# Patient Record
Sex: FEMALE | Race: ASIAN | Employment: FULL TIME | URBAN - METROPOLITAN AREA
[De-identification: names, ages, dates, MRNs, and addresses within clinical notes are randomized per-mention and may not be internally consistent; named-entity substitution may affect disease eponyms.]

---

## 2017-04-10 ENCOUNTER — OFFICE VISIT (OUTPATIENT)
Dept: ENDOCRINOLOGY | Facility: CLINIC | Age: 67
End: 2017-04-10

## 2017-04-10 VITALS
BODY MASS INDEX: 18.18 KG/M2 | WEIGHT: 90.2 LBS | SYSTOLIC BLOOD PRESSURE: 131 MMHG | HEART RATE: 65 BPM | DIASTOLIC BLOOD PRESSURE: 79 MMHG | HEIGHT: 59 IN

## 2017-04-10 DIAGNOSIS — E04.2 NON-TOXIC MULTINODULAR GOITER: Primary | ICD-10-CM

## 2017-04-10 DIAGNOSIS — E06.3 HASHIMOTO'S THYROIDITIS: ICD-10-CM

## 2017-04-10 DIAGNOSIS — E28.319 EARLY MENOPAUSE OCCURRING IN PATIENT AGE YOUNGER THAN 45 YEARS: ICD-10-CM

## 2017-04-10 DIAGNOSIS — E04.2 NON-TOXIC MULTINODULAR GOITER: ICD-10-CM

## 2017-04-10 LAB
ALBUMIN SERPL-MCNC: 3.8 G/DL (ref 3.4–5)
ALP SERPL-CCNC: 52 U/L (ref 40–150)
ALT SERPL W P-5'-P-CCNC: 21 U/L (ref 0–50)
ANION GAP SERPL CALCULATED.3IONS-SCNC: 6 MMOL/L (ref 3–14)
AST SERPL W P-5'-P-CCNC: 24 U/L (ref 0–45)
BILIRUB SERPL-MCNC: 0.4 MG/DL (ref 0.2–1.3)
BUN SERPL-MCNC: 18 MG/DL (ref 7–30)
CALCIUM SERPL-MCNC: 8.9 MG/DL (ref 8.5–10.1)
CHLORIDE SERPL-SCNC: 107 MMOL/L (ref 94–109)
CO2 SERPL-SCNC: 28 MMOL/L (ref 20–32)
CREAT SERPL-MCNC: 0.78 MG/DL (ref 0.52–1.04)
GFR SERPL CREATININE-BSD FRML MDRD: 74 ML/MIN/1.7M2
GLUCOSE SERPL-MCNC: 88 MG/DL (ref 70–99)
POTASSIUM SERPL-SCNC: 4 MMOL/L (ref 3.4–5.3)
PROT SERPL-MCNC: 7.9 G/DL (ref 6.8–8.8)
SODIUM SERPL-SCNC: 141 MMOL/L (ref 133–144)
T4 FREE SERPL-MCNC: 0.92 NG/DL (ref 0.76–1.46)
TSH SERPL DL<=0.05 MIU/L-ACNC: 0.6 MU/L (ref 0.4–4)

## 2017-04-10 ASSESSMENT — PAIN SCALES - GENERAL: PAINLEVEL: NO PAIN (0)

## 2017-04-10 NOTE — PROGRESS NOTES
"                          - Endocrinology Initial Consultation -    Reason for visit/consult:  Multinodular goiter    Primary care provider: Donna Canales    HPI: 67 yo female originally from [place], here for the annual follow up for MNGs. She is a former patient of Dr. Oropeza.   Patient has multiple nodules, originally found by self exam in the mirror, since then she has been yearly follow up, last sonogram was 10/2015, last FNA was 2014 benign. Total 8 nodules 4 right, 4 left. No complaint today.     Appetite: good, Sleep OK, doing exercise in the morning, working [employer]  Lives by her self   Menopause age 43, never had pregnancy    Past Medical/Surgical History:  Past Medical History:   Diagnosis Date     Thyroid disease      Past Surgical History:   Procedure Laterality Date     APPENDECTOMY  09/17/12     COLONOSCOPY  10/2007    repeat 10 yrs     COLONOSCOPY  6/2014    repeat 10 yrs       Allergies:  Allergies   Allergen Reactions     Acetaminophen      tylenol     Aspirin [Dihydroxyaluminum Aminoacetate]        Current Medications   Current Outpatient Prescriptions   Medication     multivitamin, therapeutic with minerals (MULTI-VITAMIN) TABS     IBUPROFEN PO     No current facility-administered medications for this visit.        Family History:  Family History   Problem Relation Age of Onset     HEART DISEASE Mother    no family history of thyroid disease, nor osteoporosis, bone fractures    Social History:  Social History   Substance Use Topics     Smoking status: Never Smoker     Smokeless tobacco: Never Used     Alcohol use No   No children, lives by herself    ROS:  Full review of systems taken with the help of the intake sheet. Otherwise a complete 14 point review of systems was taken and is negative unless stated in the history above.    Physical Exam:   Blood pressure 131/79, pulse 65, height 1.499 m (4' 11\"), weight 40.9 kg (90 lb 3.2 oz)  General: well appearing, no " acute distress, pleasant and conversant,   Mental Status/neuro: alert and oriented  Face: symmetrical, normal facial color  Eyes: anicteric, PERRL, no proptosis or lid lag  Neck: suppler, no lymphadenopahty  Back: no kyphosis  Thyroid: normal size and texture, left 2 cm round nodule palpable, right upper 1 cm nodule palpable, no bruits  Heart: regular rhythm, S1S2, no murmur appreciated  Lung: clear to auscultation bilaterally  Abdomen: soft, NT/ND, no hepatomegaly  Legs: no swelling or edema  Feet: no deformities or ulcers, 2+ DP pulses, normal monofilament sensation      Labs (General):   Lab Results   Component Value Date     10/26/2013      Lab Results   Component Value Date    POTASSIUM 4.3 10/26/2013     Lab Results   Component Value Date    CHLORIDE 104 10/26/2013     Lab Results   Component Value Date    SHANAE 9.9 10/26/2013     Lab Results   Component Value Date    CO2 25 10/26/2013     Lab Results   Component Value Date    BUN 14 10/26/2013     Lab Results   Component Value Date    CR 0.80 10/26/2013     Lab Results   Component Value Date    GLC 95 10/26/2013     Lab Results   Component Value Date    TSH 1.16 04/06/2016     Lab Results   Component Value Date    T4 0.92 04/06/2016     Lab Results   Component Value Date    A1C 5.5 10/27/2016     US THYROID, 10/14/2015 : I also personally reviewed original images and agree with below report for MNGs.      COMPARISON: Multiple, the most recent from 4/22/2014.     HISTORY: Multinodular thyroid goiter. Ultrasound guided fine needle  aspiration of right thyroid nodule 4 and left nodule 2 was performed  4/30/2014, both demonstrated benign pathology.     Technique: Grayscale and color ultrasound imaging of the thyroid was  performed.     Findings:   Thyroid parenchyma: Homogeneous background with multiple nodules.     The right lobe of the thyroid measures: 2.0 x 1.7 x 6.4 cm.      The thyroid isthmus measures: 0.18 cm      The left lobe of the thyroid  measures: 2.2 x 1.8 x 5.8 cm.      Right lobe:  Nodule 1: Superior  Nodule measurement: 6 x 5 x 8 mm previously 6 x 5 x 6 mm.  Echogenicity: Isoechoic to mildly hypoechoic  Consistency: solid  Calcifications: no  Hypervascular: No significant vascularity  Interval growth (>20%): no     Nodule 2: Mid superior  Nodule measurement: 1.4 x 0.9 x 1.4 cm  Echogenicity: Heterogeneous  Consistency: Predominantly solid with some cystic components  Calcifications: Possibly  Hypervascular: Mild internal vascularity  Interval growth (>20%): no     Nodule 3: Mid, previously labeled 4.  Nodule measurement: 1.5 x 1.0 x 1.5 cm  Echogenicity: Heterogeneous  Consistency: Predominantly solid with some cystic components  Calcifications: Possibly  Hypervascular: Mild internal vascularity  Interval growth (>20%): no     Nodule 4: Inferior medial, previously labeled 3  Nodule measurement: 5 x 5 x 6 mm  Echogenicity: Coarse peripheral shadowing calcifications  Calcifications: Peripheral  Hypervascular: no  Interval growth (>20%): no     Isthmus: None     Left Lobe:   Nodule 1: Superior  Nodule measurement: 7 x 5 x 7 mm  Echogenicity: Heterogeneous  Consistency: Predominantly solid with cystic components  Calcifications: no  Hypervascular: no  Interval growth (>20%): no     Nodule 2: Mid medial  Nodule measurement: 1.6 x 0.7 x 0.8 centimeters  Echogenicity: Mixed  Consistency: Solid and cystic  Calcifications: no  Hypervascular: no  Interval growth (>20%): no     Nodule 3: Mid   Nodule measurement: 1.3 x 1.2 x 2.2 centimeters  Echogenicity: Mixed  Consistency: Predominantly cystic with solid components  Calcifications: yes  Hypervascular: no  Interval growth (>20%): no     Nodule 4: Inferior  Nodule measurement: 1.3 x 1.1 x 1.7 cm  Echogenicity: Mixed  Consistency: Predominantly solid with cystic components  Calcifications: yes  Hypervascular: yes  Interval growth (>20%): no     IMPRESSION  Impression:  No significant change in multiple  bilateral thyroid nodules since  4/22/2014, as described above.        Assessment and Plan  66 year old female with multinodular goiter,  # MNGs, appears stable, will do annual check  # Bone health+ risk factor, will do DXA and erma vitaminD supplement.     - Thyroid sonogram to follow up  -TSH, free T4  -CMP (no BP since 2013)  - Discussed bone health - Citracal+D pettite 2 tabs BID (elemental Ca 800 mg, VitD 1000ID)  - Bone density test (risk factor: early menopause and low BMI 18,  female)  - RTC with me in 1 year    I spent 30 minutes with this patient face to face and explained the conditions and plans (more than 50% of time was counseling/coordination of care, thyroid nodule follow up plan and bone health) . The patient understood and is satisfied with today's visit. Return to clinic with me in 1 year.         Linda Hodgson MD  Staff Physician  Endocrinology and Metabolism  License: [id number]

## 2017-04-10 NOTE — LETTER
4/10/2017       RE: Talon SILVA Son  [address]   [place] [address]     Dear Colleague,    Thank you for referring your patient, Talon SILVA Son, to the [facility] ENDOCRINOLOGY at [facility]. Please see a copy of my visit note below.                              - Endocrinology Initial Consultation -    Reason for visit/consult:  Multinodular goiter    Primary care provider: Donna Canales    HPI: 65 yo female originally from [place], here for the annual follow up for MNGs. She is a former patient of Dr. Oropeza.   Patient has multiple nodules, originally found by self exam in the mirror, since then she has been yearly follow up, last sonogram was 10/2015, last FNA was 2014 benign. Total 8 nodules 4 right, 4 left. No complaint today.     Appetite: good, Sleep OK, doing exercise in the morning, working [employer] [facility]  Lives by her self   Menopause age 43, never had pregnancy    Past Medical/Surgical History:  Past Medical History:   Diagnosis Date     Thyroid disease      Past Surgical History:   Procedure Laterality Date     APPENDECTOMY  09/17/12     COLONOSCOPY  10/2007    repeat 10 yrs     COLONOSCOPY  6/2014    repeat 10 yrs       Allergies:  Allergies   Allergen Reactions     Acetaminophen      tylenol     Aspirin [Dihydroxyaluminum Aminoacetate]        Current Medications   Current Outpatient Prescriptions   Medication     multivitamin, therapeutic with minerals (MULTI-VITAMIN) TABS     IBUPROFEN PO     No current facility-administered medications for this visit.        Family History:  Family History   Problem Relation Age of Onset     HEART DISEASE Mother    no family history of thyroid disease, nor osteoporosis, bone fractures    Social History:  Social History   Substance Use Topics     Smoking status: Never Smoker     Smokeless tobacco: Never Used     Alcohol use No   No children, lives by herself    ROS:  Full review of systems taken  "with the help of the intake sheet. Otherwise a complete 14 point review of systems was taken and is negative unless stated in the history above.    Physical Exam:   Blood pressure 131/79, pulse 65, height 1.499 m (4' 11\"), weight 40.9 kg (90 lb 3.2 oz)  General: well appearing, no acute distress, pleasant and conversant,   Mental Status/neuro: alert and oriented  Face: symmetrical, normal facial color  Eyes: anicteric, PERRL, no proptosis or lid lag  Neck: suppler, no lymphadenopahty  Back: no kyphosis  Thyroid: normal size and texture, left 2 cm round nodule palpable, right upper 1 cm nodule palpable, no bruits  Heart: regular rhythm, S1S2, no murmur appreciated  Lung: clear to auscultation bilaterally  Abdomen: soft, NT/ND, no hepatomegaly  Legs: no swelling or edema  Feet: no deformities or ulcers, 2+ DP pulses, normal monofilament sensation      Labs (General):   Lab Results   Component Value Date     10/26/2013      Lab Results   Component Value Date    POTASSIUM 4.3 10/26/2013     Lab Results   Component Value Date    CHLORIDE 104 10/26/2013     Lab Results   Component Value Date    SHANAE 9.9 10/26/2013     Lab Results   Component Value Date    CO2 25 10/26/2013     Lab Results   Component Value Date    BUN 14 10/26/2013     Lab Results   Component Value Date    CR 0.80 10/26/2013     Lab Results   Component Value Date    GLC 95 10/26/2013     Lab Results   Component Value Date    TSH 1.16 04/06/2016     Lab Results   Component Value Date    T4 0.92 04/06/2016     Lab Results   Component Value Date    A1C 5.5 10/27/2016     US THYROID, 10/14/2015 : I also personally reviewed original images and agree with below report for MNGs.      COMPARISON: Multiple, the most recent from 4/22/2014.     HISTORY: Multinodular thyroid goiter. Ultrasound guided fine needle  aspiration of right thyroid nodule 4 and left nodule 2 was performed  4/30/2014, both demonstrated benign pathology.     Technique: Grayscale and color " ultrasound imaging of the thyroid was  performed.     Findings:   Thyroid parenchyma: Homogeneous background with multiple nodules.     The right lobe of the thyroid measures: 2.0 x 1.7 x 6.4 cm.      The thyroid isthmus measures: 0.18 cm      The left lobe of the thyroid measures: 2.2 x 1.8 x 5.8 cm.      Right lobe:  Nodule 1: Superior  Nodule measurement: 6 x 5 x 8 mm previously 6 x 5 x 6 mm.  Echogenicity: Isoechoic to mildly hypoechoic  Consistency: solid  Calcifications: no  Hypervascular: No significant vascularity  Interval growth (>20%): no     Nodule 2: Mid superior  Nodule measurement: 1.4 x 0.9 x 1.4 cm  Echogenicity: Heterogeneous  Consistency: Predominantly solid with some cystic components  Calcifications: Possibly  Hypervascular: Mild internal vascularity  Interval growth (>20%): no     Nodule 3: Mid, previously labeled 4.  Nodule measurement: 1.5 x 1.0 x 1.5 cm  Echogenicity: Heterogeneous  Consistency: Predominantly solid with some cystic components  Calcifications: Possibly  Hypervascular: Mild internal vascularity  Interval growth (>20%): no     Nodule 4: Inferior medial, previously labeled 3  Nodule measurement: 5 x 5 x 6 mm  Echogenicity: Coarse peripheral shadowing calcifications  Calcifications: Peripheral  Hypervascular: no  Interval growth (>20%): no     Isthmus: None     Left Lobe:   Nodule 1: Superior  Nodule measurement: 7 x 5 x 7 mm  Echogenicity: Heterogeneous  Consistency: Predominantly solid with cystic components  Calcifications: no  Hypervascular: no  Interval growth (>20%): no     Nodule 2: Mid medial  Nodule measurement: 1.6 x 0.7 x 0.8 centimeters  Echogenicity: Mixed  Consistency: Solid and cystic  Calcifications: no  Hypervascular: no  Interval growth (>20%): no     Nodule 3: Mid   Nodule measurement: 1.3 x 1.2 x 2.2 centimeters  Echogenicity: Mixed  Consistency: Predominantly cystic with solid components  Calcifications: yes  Hypervascular: no  Interval growth (>20%):  no     Nodule 4: Inferior  Nodule measurement: 1.3 x 1.1 x 1.7 cm  Echogenicity: Mixed  Consistency: Predominantly solid with cystic components  Calcifications: yes  Hypervascular: yes  Interval growth (>20%): no     IMPRESSION  Impression:  No significant change in multiple bilateral thyroid nodules since  4/22/2014, as described above.        Assessment and Plan  66 year old female with multinodular goiter,  # MNGs, appears stable, will do annual check  # Bone health+ risk factor, will do DXA and erma vitaminD supplement.     - Thyroid sonogram to follow up  -TSH, free T4  -CMP (no BP since 2013)  - Discussed bone health - Citracal+D pettite 2 tabs BID (elemental Ca 800 mg, VitD 1000ID)  - Bone density test (risk factor: early menopause and low BMI 18,  female)  - RTC with me in 1 year    I spent 30 minutes with this patient face to face and explained the conditions and plans (more than 50% of time was counseling/coordination of care, thyroid nodule follow up plan and bone health) . The patient understood and is satisfied with today's visit. Return to clinic with me in 1 year.         Linda Hodgson MD  Staff Physician  Endocrinology and Metabolism  License: [id number]

## 2017-04-10 NOTE — LETTER
Patient:  Talon SILVA Son  :   1950  MRN:     [id number]        Ms.Lang SILVA Son  [address]   [place] [address]        2017    Dear ,    We are writing to inform you of your test results. Blood work were all normal range.    Take care           Resulted Orders   Comprehensive metabolic panel   Result Value Ref Range    Sodium 141 133 - 144 mmol/L    Potassium 4.0 3.4 - 5.3 mmol/L    Chloride 107 94 - 109 mmol/L    Carbon Dioxide 28 20 - 32 mmol/L    Anion Gap 6 3 - 14 mmol/L    Glucose 88 70 - 99 mg/dL    Urea Nitrogen 18 7 - 30 mg/dL    Creatinine 0.78 0.52 - 1.04 mg/dL    GFR Estimate 74 >60 mL/min/1.7m2      Comment:      Non  GFR Calc    GFR Estimate If Black 90 >60 mL/min/1.7m2      Comment:       GFR Calc    Calcium 8.9 8.5 - 10.1 mg/dL    Bilirubin Total 0.4 0.2 - 1.3 mg/dL    Albumin 3.8 3.4 - 5.0 g/dL    Protein Total 7.9 6.8 - 8.8 g/dL    Alkaline Phosphatase 52 40 - 150 U/L    ALT 21 0 - 50 U/L    AST 24 0 - 45 U/L   T4 free   Result Value Ref Range    T4 Free 0.92 0.76 - 1.46 ng/dL   TSH   Result Value Ref Range    TSH 0.60 0.40 - 4.00 mU/L       Linda Hodgson MD

## 2017-04-10 NOTE — NURSING NOTE
Chief Complaint   Patient presents with     RECHECK     yearly thyroid check      Sakina Valdez CMA

## 2017-04-10 NOTE — MR AVS SNAPSHOT
After Visit Summary   4/10/2017    Talon SILVA Son    MRN: [id number]           Patient Information     Date Of Birth          1950        Visit Information        Provider Department      4/10/2017 9:00 AM Linda Hodgson MD [facility] Endocrinology        Today's Diagnoses     Non-toxic multinodular goiter    -  1    Hashimoto's thyroiditis        Early menopause occurring in patient age younger than 45 years           Follow-ups after your visit        Your next 10 appointments already scheduled     Apr 10, 2017 10:00 AM CDT   LAB with  LAB   [facility])    [address] [place] [id number] [phone]           Patient must bring picture ID.  Patient should be prepared to give a urine specimen  Please do not eat 10-12 hours before your appointment if you are coming in fasting for labs on lipids, cholesterol, or glucose (sugar).  Pregnant women should follow their Care Team instructions. Water with medications is okay. Do not drink coffee or other fluids.   If you have concerns about taking  your medications, please ask at office or if scheduling via [employer], send a message by clicking on Secure Messaging, Message Your Care Team.            Apr 10, 2017  1:30 PM CDT   US THYROID with UCUS2   [facility] ([facility])    [address] [place] [address] [phone]           Please bring a list of your medicines (including vitamins, minerals and over-the-counter drugs). Also, tell your doctor about any allergies you may have. Wear comfortable clothes and leave your valuables at home.  You do not need to do anything special to prepare for your exam.  Please call the Imaging Department at your exam site with any questions.              Future tests that were ordered for you today     Open Future Orders        Priority Expected Expires Ordered    TSH Routine  4/10/2018  "4/10/2017    T4 free Routine  4/10/2018 4/10/2017    US Thyroid Routine  2017 4/10/2017            Who to contact     Please call your clinic at [phone] to:    Ask questions about your health    Make or cancel appointments    Discuss your medicines    Learn about your test results    Speak to your doctor   If you have compliments or concerns about an experience at your clinic, or if you wish to file a complaint, please contact [facility] Patient Relations at [phone] or email us at [email or web address]         Additional Information About Your Visit        [employer] Information     [employer] is an electronic gateway that provides easy, online access to your medical records. With [employer], you can request a clinic appointment, read your test results, renew a prescription or communicate with your care team.     To sign up for [employer] visit the website at [email or web address]   You will be asked to enter the access code listed below, as well as some personal information. Please follow the directions to create your username and password.     Your access code is: [email or web address]  Expires: 2017  6:30 AM     Your access code will  in 90 days. If you need help or a new code, please contact your [facility] or call [phone] for assistance.        Care EveryWhere ID     This is your Care EveryWhere ID. This could be used by other organizations to access your [place] medical records  [id number]        Your Vitals Were     Pulse Height BMI (Body Mass Index)             65 1.499 m (4' 11\") 18.22 kg/m2          Blood Pressure from Last 3 Encounters:   04/10/17 131/79   10/27/16 135/76   16 130/81    Weight from Last 3 Encounters:   04/10/17 40.9 kg (90 lb 3.2 oz)   10/27/16 40.9 kg (90 lb 1.6 oz)   16 40.7 kg (89 lb 12.8 oz)              We Performed the Following     Comprehensive metabolic panel        Primary Care " Provider Office Phone # Fax #    Donna Canales PA-C [phone]       [facility] [address] [address]        Thank you!     Thank you for choosing [facility]  for your care. Our goal is always to provide you with excellent care. Hearing back from our patients is one way we can continue to improve our services. Please take a few minutes to complete the written survey that you may receive in the mail after your visit with us. Thank you!             Your Updated Medication List - Protect others around you: Learn how to safely use, store and throw away your medicines at www.disposemymeds.org.          This list is accurate as of: 4/10/17  9:14 AM.  Always use your most recent med list.                   Brand Name Dispense Instructions for use    IBUPROFEN PO      Take 200 mg by mouth as needed for moderate pain Reported on 4/10/2017       Multi-vitamin Tabs tablet      Take 1 tablet by mouth daily

## 2017-04-25 ENCOUNTER — TELEPHONE (OUTPATIENT)
Dept: ENDOCRINOLOGY | Facility: CLINIC | Age: 67
End: 2017-04-25

## 2017-04-25 PROBLEM — M81.0 OSTEOPOROSIS: Status: ACTIVE | Noted: 2017-04-25

## 2017-04-25 RX ORDER — ZOLEDRONIC ACID 5 MG/100ML
5 INJECTION, SOLUTION INTRAVENOUS ONCE
Status: CANCELLED
Start: 2017-04-25 | End: 2017-04-25

## 2017-04-25 NOTE — TELEPHONE ENCOUNTER
I called left message.    1. thyroidsonogram- no change, sent letter of the results    2. Bone density- osteoporosis, continue Ca ViD but also need other class of medications.  Left message to discuss with us.    Linda Hodgson MD  Staff Physician  Endocrinology and Metabolism  [facility]  License: [id number]  Pager: [phone]

## 2017-05-08 ENCOUNTER — OFFICE VISIT (OUTPATIENT)
Dept: ENDOCRINOLOGY | Facility: CLINIC | Age: 67
End: 2017-05-08

## 2017-05-08 VITALS
WEIGHT: 92.6 LBS | DIASTOLIC BLOOD PRESSURE: 86 MMHG | HEIGHT: 59 IN | HEART RATE: 73 BPM | BODY MASS INDEX: 18.67 KG/M2 | SYSTOLIC BLOOD PRESSURE: 120 MMHG

## 2017-05-08 DIAGNOSIS — M81.0 OSTEOPOROSIS: Primary | ICD-10-CM

## 2017-05-08 RX ORDER — ZOLEDRONIC ACID 5 MG/100ML
5 INJECTION, SOLUTION INTRAVENOUS ONCE
Status: CANCELLED
Start: 2017-05-09 | End: 2017-05-09

## 2017-05-08 ASSESSMENT — PAIN SCALES - GENERAL: PAINLEVEL: NO PAIN (0)

## 2017-05-08 NOTE — LETTER
"5/8/2017       RE: Talon SILVA Son  [address]   [facility] [address]     Dear Colleague,    Thank you for referring your patient, Talon SILVA Son, to the [facility] ENDOCRINOLOGY at [facility]. Please see a copy of my visit note below.                                       - Endocrinology Follow up -    Reason for visit/consult: newly diagnosed osteoporosis    Primary care provider: Donna Canales    HPI: 67 yo female, former Dr. Oropeza's patient with stable MNGs, last visit discussed about bone health since she has some risk factors, BMI<20,  female, menopausal. Also sent screening DXA, which came back osteoporosis.   She came here today to hear about the result and discuss the treatment options.   No history of fractures      Past Medical/Surgical History:  Past Medical History:   Diagnosis Date     Thyroid disease      Past Surgical History:   Procedure Laterality Date     APPENDECTOMY  09/17/12     COLONOSCOPY  10/2007    repeat 10 yrs     COLONOSCOPY  6/2014    repeat 10 yrs       Allergies:  Allergies   Allergen Reactions     Acetaminophen      tylenol     Aspirin [Dihydroxyaluminum Aminoacetate]        Current Medications   Current Outpatient Prescriptions   Medication     IBUPROFEN PO     multivitamin, therapeutic with minerals (MULTI-VITAMIN) TABS     No current facility-administered medications for this visit.        Family History:  Family History   Problem Relation Age of Onset     HEART DISEASE Mother        Social History:  Social History   Substance Use Topics     Smoking status: Never Smoker     Smokeless tobacco: Never Used     Alcohol use No       ROS:  Full review of systems taken with the help of the intake sheet. Otherwise a complete 14 point review of systems was taken and is negative unless stated in the history above.    Physical Exam:   Blood pressure 120/86, pulse 73, height 1.499 m (4' 11\"), weight 42 kg (92 lb 9.6 oz), not " currently breastfeeding.  General: well appearing, no acute distress, pleasant and conversant,   Mental Status/neuro: alert and oriented  Face: symmetrical, normal facial color  Eyes: anicteric, PERRL,  Neck: suppler, no lymphadenopahty  Thyroid: normal size and texture, no nodule palpable  Heart: regular rhythm, S1S2, no murmur appreciated  Back: no kyphosis  Lung: clear to auscultation bilaterally  Abdomen: soft, NT/ND, no hepatomegaly  Legs: no swelling or edema    Labs (General):   Lab Results   Component Value Date     04/10/2017      Lab Results   Component Value Date    POTASSIUM 4.0 04/10/2017     Lab Results   Component Value Date    CHLORIDE 107 04/10/2017     Lab Results   Component Value Date    SHANAE 8.9 04/10/2017     Lab Results   Component Value Date    CO2 28 04/10/2017     Lab Results   Component Value Date    BUN 18 04/10/2017     Lab Results   Component Value Date    CR 0.78 04/10/2017     Lab Results   Component Value Date    GLC 88 04/10/2017     Lab Results   Component Value Date    TSH 0.60 04/10/2017     Lab Results   Component Value Date    T4 0.92 04/10/2017     Lab Results   Component Value Date    A1C 5.5 10/27/2016         Labs:     4/10/2017 09:55   Sodium 141   Potassium 4.0   Chloride 107   Carbon Dioxide 28   Urea Nitrogen 18   Creatinine 0.78   GFR Estimate 74   Calcium 8.9   Anion Gap 6   Albumin 3.8   Protein Total 7.9   Bilirubin Total 0.4   Alkaline Phosphatase 52   ALT 21   AST 24   T4 Free 0.92   TSH 0.60       Bone density test 4/13/2017: I also personally reviewed original images and agree below report, lumber+ degenerative changes with possible compression fractures. I also went over the images with patient explained and interpret together.      Dual energy x-ray absorptiometry results:      Region BMD T - score Z - score   L1-L3 0.746 g/cm  -3.5 -1.2             Neck Left 0.688 g/cm  -2.5 -0.6   Total Left 0.769 g/cm  -1.9 -0.2             Neck Right 0.729 g/cm  -2.2  -0.3   Total Right 0.777 g/cm  -1.8 -0.1           Assessment and Plan  66 year old female with newly diagnosed osteoporosis    - She already started Calcium Citrate VitminD- continue  - Recalst infusion to set up  - Next bone density 1 year after Reclast  -RTC with me after thyroid sonogram (which we discussed last visit) and bone density in 1 year    I spent 30 minutes with this patient face to face and explained the conditions and plans (more than 50% of time was counseling/coordination of care, osteoporosis treatment options) . The patient understood and is satisfied with today's visit. Return to clinic with me in 1 year.         Linda Hodgson MD  Staff Physician  Endocrinology and Metabolism  License: [id number]    Again, thank you for allowing me to participate in the care of your patient.      Sincerely,    Linda Hodgson MD

## 2017-05-08 NOTE — PROGRESS NOTES
"                                   - Endocrinology Follow up -    Reason for visit/consult: newly diagnosed osteoporosis    Primary care provider: Donna Canales    HPI: 67 yo female, former Dr. Oropeza's patient with stable MNGs, last visit discussed about bone health since she has some risk factors, BMI<20,  female, menopausal. Also sent screening DXA, which came back osteoporosis.   She came here today to hear about the result and discuss the treatment options.   No history of fractures      Past Medical/Surgical History:  Past Medical History:   Diagnosis Date     Thyroid disease      Past Surgical History:   Procedure Laterality Date     APPENDECTOMY  09/17/12     COLONOSCOPY  10/2007    repeat 10 yrs     COLONOSCOPY  6/2014    repeat 10 yrs       Allergies:  Allergies   Allergen Reactions     Acetaminophen      tylenol     Aspirin [Dihydroxyaluminum Aminoacetate]        Current Medications   Current Outpatient Prescriptions   Medication     IBUPROFEN PO     multivitamin, therapeutic with minerals (MULTI-VITAMIN) TABS     No current facility-administered medications for this visit.        Family History:  Family History   Problem Relation Age of Onset     HEART DISEASE Mother        Social History:  Social History   Substance Use Topics     Smoking status: Never Smoker     Smokeless tobacco: Never Used     Alcohol use No       ROS:  Full review of systems taken with the help of the intake sheet. Otherwise a complete 14 point review of systems was taken and is negative unless stated in the history above.    Physical Exam:   Blood pressure 120/86, pulse 73, height 1.499 m (4' 11\"), weight 42 kg (92 lb 9.6 oz), not currently breastfeeding.  General: well appearing, no acute distress, pleasant and conversant,   Mental Status/neuro: alert and oriented  Face: symmetrical, normal facial color  Eyes: anicteric, PERRL,  Neck: suppler, no lymphadenopahty  Thyroid: normal size and texture, no nodule " palpable  Heart: regular rhythm, S1S2, no murmur appreciated  Back: no kyphosis  Lung: clear to auscultation bilaterally  Abdomen: soft, NT/ND, no hepatomegaly  Legs: no swelling or edema    Labs (General):   Lab Results   Component Value Date     04/10/2017      Lab Results   Component Value Date    POTASSIUM 4.0 04/10/2017     Lab Results   Component Value Date    CHLORIDE 107 04/10/2017     Lab Results   Component Value Date    SHANAE 8.9 04/10/2017     Lab Results   Component Value Date    CO2 28 04/10/2017     Lab Results   Component Value Date    BUN 18 04/10/2017     Lab Results   Component Value Date    CR 0.78 04/10/2017     Lab Results   Component Value Date    GLC 88 04/10/2017     Lab Results   Component Value Date    TSH 0.60 04/10/2017     Lab Results   Component Value Date    T4 0.92 04/10/2017     Lab Results   Component Value Date    A1C 5.5 10/27/2016         Labs:     4/10/2017 09:55   Sodium 141   Potassium 4.0   Chloride 107   Carbon Dioxide 28   Urea Nitrogen 18   Creatinine 0.78   GFR Estimate 74   Calcium 8.9   Anion Gap 6   Albumin 3.8   Protein Total 7.9   Bilirubin Total 0.4   Alkaline Phosphatase 52   ALT 21   AST 24   T4 Free 0.92   TSH 0.60       Bone density test 4/13/2017: I also personally reviewed original images and agree below report, lumber+ degenerative changes with possible compression fractures. I also went over the images with patient explained and interpret together.      Dual energy x-ray absorptiometry results:      Region BMD T - score Z - score   L1-L3 0.746 g/cm  -3.5 -1.2             Neck Left 0.688 g/cm  -2.5 -0.6   Total Left 0.769 g/cm  -1.9 -0.2             Neck Right 0.729 g/cm  -2.2 -0.3   Total Right 0.777 g/cm  -1.8 -0.1           Assessment and Plan  66 year old female with newly diagnosed osteoporosis    - She already started Calcium Citrate VitminD- continue  - Recalst infusion to set up  - Next bone density 1 year after Reclast  -RTC with me after  thyroid sonogram (which we discussed last visit) and bone density in 1 year    I spent 30 minutes with this patient face to face and explained the conditions and plans (more than 50% of time was counseling/coordination of care, osteoporosis treatment options) . The patient understood and is satisfied with today's visit. Return to clinic with me in 1 year.         Linda Hodgson MD  Staff Physician  Endocrinology and Metabolism  License: [id number]

## 2017-05-08 NOTE — MR AVS SNAPSHOT
After Visit Summary   5/8/2017    Talon SILVA Son    MRN: [id number]           Patient Information     Date Of Birth          1950        Visit Information        Provider Department      5/8/2017 11:00 AM Linda Hodgson MD [facility] Endocrinology        Today's Diagnoses     Osteoporosis    -  1       Follow-ups after your visit        Your next 10 appointments already scheduled     May 08, 2017 11:00 AM CDT   (Arrive by 10:45 AM)   RETURN ENDOCRINE with MD JOSE Hanna [facility] Endocrinology ([facility])    [address] [place] [address] [phone]              Future tests that were ordered for you today     Open Future Orders        Priority Expected Expires Ordered    Dexa hip/pelvis/spine Routine  12/4/2017 5/8/2017            Who to contact     Please call your clinic at [phone] to:    Ask questions about your health    Make or cancel appointments    Discuss your medicines    Learn about your test results    Speak to your doctor   If you have compliments or concerns about an experience at your clinic, or if you wish to file a complaint, please contact [facility] Patient Relations at [phone] or email us at [email or web address]         Additional Information About Your Visit        [employer] Information     [employer] gives you secure access to your electronic health record. If you see a primary care provider, you can also send messages to your care team and make appointments. If you have questions, please call your primary care clinic.  If you do not have a primary care provider, please call [phone] and they will assist you.      "[employer] is an electronic gateway that provides easy, online access to your medical records. With "[employer] you can request a clinic appointment, read your test results, renew a prescription or communicate with your care team.     To access your existing account,  "please contact your [facility] or call [phone] for assistance.        Care EveryWhere ID     This is your Care EveryWhere ID. This could be used by other organizations to access your [facility] medical records  [id number]        Your Vitals Were     Pulse Height BMI (Body Mass Index)             73 1.499 m (4' 11\") 18.7 kg/m2          Blood Pressure from Last 3 Encounters:   05/08/17 120/86   04/10/17 131/79   10/27/16 135/76    Weight from Last 3 Encounters:   05/08/17 42 kg (92 lb 9.6 oz)   04/10/17 40.9 kg (90 lb 3.2 oz)   10/27/16 40.9 kg (90 lb 1.6 oz)               Primary Care Provider Office Phone # Fax #    Donna Canales PA-C [phone]       [facility] [address] [address]  [facility] [address]        Thank you!     Thank you for choosing [facility]  for your care. Our goal is always to provide you with excellent care. Hearing back from our patients is one way we can continue to improve our services. Please take a few minutes to complete the written survey that you may receive in the mail after your visit with us. Thank you!             Your Updated Medication List - Protect others around you: Learn how to safely use, store and throw away your medicines at www.disposemymeds.org.          This list is accurate as of: 5/8/17 10:48 AM.  Always use your most recent med list.                   Brand Name Dispense Instructions for use    IBUPROFEN PO      Take 200 mg by mouth as needed for moderate pain Reported on 4/10/2017       Multi-vitamin Tabs tablet      Take 1 tablet by mouth daily         "

## 2017-06-02 ENCOUNTER — HOSPITAL ENCOUNTER (EMERGENCY)
Facility: CLINIC | Age: 67
Discharge: HOME | End: 2017-06-02
Attending: EMERGENCY MEDICINE | Admitting: EMERGENCY MEDICINE
Payer: COMMERCIAL

## 2017-06-02 VITALS
OXYGEN SATURATION: 100 % | TEMPERATURE: 97.6 F | HEART RATE: 66 BPM | SYSTOLIC BLOOD PRESSURE: 119 MMHG | HEIGHT: 60 IN | RESPIRATION RATE: 16 BRPM | DIASTOLIC BLOOD PRESSURE: 78 MMHG

## 2017-06-02 DIAGNOSIS — R42 VERTIGO: ICD-10-CM

## 2017-06-02 LAB
ANION GAP SERPL CALCULATED.3IONS-SCNC: 8 MMOL/L (ref 3–14)
BASOPHILS # BLD AUTO: 0 10E9/L (ref 0–0.2)
BASOPHILS NFR BLD AUTO: 0.3 %
BUN SERPL-MCNC: 32 MG/DL (ref 7–30)
CALCIUM SERPL-MCNC: 9.1 MG/DL (ref 8.5–10.1)
CHLORIDE SERPL-SCNC: 107 MMOL/L (ref 94–109)
CO2 SERPL-SCNC: 28 MMOL/L (ref 20–32)
CREAT SERPL-MCNC: 0.83 MG/DL (ref 0.52–1.04)
DIFFERENTIAL METHOD BLD: ABNORMAL
EOSINOPHIL # BLD AUTO: 0.1 10E9/L (ref 0–0.7)
EOSINOPHIL NFR BLD AUTO: 1.5 %
ERYTHROCYTE [DISTWIDTH] IN BLOOD BY AUTOMATED COUNT: 11.9 % (ref 10–15)
GFR SERPL CREATININE-BSD FRML MDRD: 69 ML/MIN/1.7M2
GLUCOSE SERPL-MCNC: 111 MG/DL (ref 70–99)
HCT VFR BLD AUTO: 34.4 % (ref 35–47)
HGB BLD-MCNC: 11.9 G/DL (ref 11.7–15.7)
IMM GRANULOCYTES # BLD: 0 10E9/L (ref 0–0.4)
IMM GRANULOCYTES NFR BLD: 0.2 %
LYMPHOCYTES # BLD AUTO: 0.9 10E9/L (ref 0.8–5.3)
LYMPHOCYTES NFR BLD AUTO: 14.6 %
MCH RBC QN AUTO: 33.3 PG (ref 26.5–33)
MCHC RBC AUTO-ENTMCNC: 34.6 G/DL (ref 31.5–36.5)
MCV RBC AUTO: 96 FL (ref 78–100)
MONOCYTES # BLD AUTO: 0.3 10E9/L (ref 0–1.3)
MONOCYTES NFR BLD AUTO: 4.6 %
NEUTROPHILS # BLD AUTO: 4.8 10E9/L (ref 1.6–8.3)
NEUTROPHILS NFR BLD AUTO: 78.8 %
NRBC # BLD AUTO: 0 10*3/UL
NRBC BLD AUTO-RTO: 0 /100
PLATELET # BLD AUTO: 199 10E9/L (ref 150–450)
POTASSIUM SERPL-SCNC: 4.1 MMOL/L (ref 3.4–5.3)
RBC # BLD AUTO: 3.57 10E12/L (ref 3.8–5.2)
SODIUM SERPL-SCNC: 143 MMOL/L (ref 133–144)
WBC # BLD AUTO: 6 10E9/L (ref 4–11)

## 2017-06-02 PROCEDURE — 93010 ELECTROCARDIOGRAM REPORT: CPT | Mod: Z6 | Performed by: EMERGENCY MEDICINE

## 2017-06-02 PROCEDURE — 25000131 ZZH RX MED GY IP 250 OP 636 PS 637: Performed by: EMERGENCY MEDICINE

## 2017-06-02 PROCEDURE — 80048 BASIC METABOLIC PNL TOTAL CA: CPT | Performed by: EMERGENCY MEDICINE

## 2017-06-02 PROCEDURE — 85025 COMPLETE CBC W/AUTO DIFF WBC: CPT | Performed by: EMERGENCY MEDICINE

## 2017-06-02 PROCEDURE — 93005 ELECTROCARDIOGRAM TRACING: CPT | Performed by: EMERGENCY MEDICINE

## 2017-06-02 PROCEDURE — 99284 EMERGENCY DEPT VISIT MOD MDM: CPT | Performed by: EMERGENCY MEDICINE

## 2017-06-02 PROCEDURE — 25000125 ZZHC RX 250: Performed by: EMERGENCY MEDICINE

## 2017-06-02 PROCEDURE — 99284 EMERGENCY DEPT VISIT MOD MDM: CPT | Mod: 25 | Performed by: EMERGENCY MEDICINE

## 2017-06-02 RX ORDER — ONDANSETRON 4 MG/1
4 TABLET, ORALLY DISINTEGRATING ORAL ONCE
Status: COMPLETED | OUTPATIENT
Start: 2017-06-02 | End: 2017-06-02

## 2017-06-02 RX ORDER — MECLIZINE HCL 12.5 MG 12.5 MG/1
12.5 TABLET ORAL 4 TIMES DAILY PRN
Qty: 30 TABLET | Refills: 0 | Status: SHIPPED | OUTPATIENT
Start: 2017-06-02 | End: 2018-10-11

## 2017-06-02 RX ORDER — MECLIZINE HYDROCHLORIDE 25 MG/1
25 TABLET ORAL ONCE
Status: COMPLETED | OUTPATIENT
Start: 2017-06-02 | End: 2017-06-02

## 2017-06-02 RX ADMIN — MECLIZINE HYDROCHLORIDE 25 MG: 25 TABLET ORAL at 06:07

## 2017-06-02 RX ADMIN — ONDANSETRON 4 MG: 4 TABLET, ORALLY DISINTEGRATING ORAL at 06:07

## 2017-06-02 ASSESSMENT — ENCOUNTER SYMPTOMS
ABDOMINAL PAIN: 0
WEAKNESS: 0
FEVER: 0
LIGHT-HEADEDNESS: 0
HEADACHES: 0
DIZZINESS: 1
SHORTNESS OF BREATH: 0

## 2017-06-02 NOTE — ED PROVIDER NOTES
History     Chief Complaint   Patient presents with     Dizziness     started around 0300.     HPI  Talon SILVA Son is a 66 year old female who presents with vertigo.  Had sudden onset of vertigo last night.  No weakness or numbness.  No visual change.  No difficulty speaking.  Denies a headache.  States she has similar episode about 6 years ago.  Does not remember what was cause.  Denies any trauma.  No fevers or chills.  No chest pain palpitations or shortness of breath.    I have reviewed the Medications, Allergies, Past Medical and Surgical History, and Social History in the [facility] system.  Past Medical History:   Diagnosis Date     Thyroid disease        Past Surgical History:   Procedure Laterality Date     APPENDECTOMY  09/17/12     COLONOSCOPY  10/2007    repeat 10 yrs     COLONOSCOPY  6/2014    repeat 10 yrs       Family History   Problem Relation Age of Onset     HEART DISEASE Mother        Social History   Substance Use Topics     Smoking status: Never Smoker     Smokeless tobacco: Never Used     Alcohol use No      Review of Systems   Constitutional: Negative for fever.   Respiratory: Negative for shortness of breath.    Cardiovascular: Negative for chest pain.   Gastrointestinal: Negative for abdominal pain.   Neurological: Positive for dizziness. Negative for weakness, light-headedness and headaches.   All other systems reviewed and are negative.      Physical Exam   BP: 142/78  Pulse: 64  Temp: 97.6  F (36.4  C)  Resp: 16  Height: 152.4 cm (5')  SpO2: 100 %  Physical Exam  Vital Signs and Nursing Notes Reviewed.  General:  NAD  HEENT: Oropharynx clear.  No obvious signs of trauma.  PERRL.  EOMI  Neck: Supple.  No lymphadenopathy.  Cardiac: RRR.  No murmurs, rubs or gallops  Lungs: Clear bilaterally.  Normal respiratory rate.    Abdomen:  Soft, Nontender, no rebound or guarding.  Back: No CVA tenderness.  Skin:  No rash.  No diaphoresis  Extremities:  No cyanosis, clubbing, or edema.  Neurological:  CN  II-XII intact, Strength intact, Sensation intact, No pronator drift, Normal gait.  Pulse:  Symmetric in bilateral upper and lower extremities.    ED Course     ED Course     Procedures             EKG Interpretation:      Interpreted by Rip Martinez  Time reviewed: 602  Symptoms at time of EKG: dizziness   Rhythm: normal sinus   Rate: normal  Axis: normal  Ectopy: none  Conduction: normal  ST Segments/ T Waves: No ST-T wave changes  Q Waves: none  Comparison to prior: No old EKG available    Clinical Impression: normal EKG          Critical Care time:  none               Labs Ordered and Resulted from Time of ED Arrival Up to the Time of Departure from the ED   BASIC METABOLIC PANEL - Abnormal; Notable for the following:        Result Value    Glucose 111 (*)     Urea Nitrogen 32 (*)     All other components within normal limits   CBC WITH PLATELETS DIFFERENTIAL - Abnormal; Notable for the following:     RBC Count 3.57 (*)     Hematocrit 34.4 (*)     MCH 33.3 (*)     All other components within normal limits            Assessments & Plan (with Medical Decision Making)   66 year old who presents with sudden onset of vertigo.  It is positional.  Seems likely peripheral.  No focal neural deficits.  Patient given meclizine with improved symptoms.  Labs are otherwise unremarkable.  No signs of a central cause.  Ears are clear.  Patient sent home with Zofran and meclizine.  She will return for worsening symptoms.    I have reviewed the nursing notes.    I have reviewed the findings, diagnosis, plan and need for follow up with the patient.    Discharge Medication List as of 6/2/2017  7:22 AM      START taking these medications    Details   meclizine (ANTIVERT) 12.5 MG tablet Take 1 tablet (12.5 mg) by mouth 4 times daily as needed for dizziness, Disp-30 tablet, R-0, Local Print             Final diagnoses:   Vertigo       6/2/2017   [facility], [place], EMERGENCY DEPARTMENT     Rip Martinez MD  06/02/17 0749

## 2017-06-02 NOTE — ED AVS SNAPSHOT
[facility], [place], Emergency Department    [address] [place] [address]    Phone:  [phone]    Fax:  [phone]                                       Talon Myers   MRN: [id number]    Department:  [facility], Emergency Department   Date of Visit:  6/2/2017           After Visit Summary Signature Page     I have received my discharge instructions, and my questions have been answered. I have discussed any challenges I see with this plan with the nurse or doctor.    ..........................................................................................................................................  Patient/Patient Representative Signature      ..........................................................................................................................................  Patient Representative Print Name and Relationship to Patient    ..................................................               ................................................  Date                                            Time    ..........................................................................................................................................  Reviewed by Signature/Title    ...................................................              ..............................................  Date                                                            Time

## 2017-06-02 NOTE — ED AVS SNAPSHOT
[facility], Emergency Department    [address] [place] [address]    Phone:  [phone]    Fax:  [phone]                                       Talon Myers   MRN: [id number]    Department:  [facility], Emergency Department   Date of Visit:  6/2/2017           Patient Information     Date Of Birth          1950        Your diagnoses for this visit were:     Vertigo        You were seen by Rip Martinez MD.        Discharge Instructions         Please make an appointment to follow up with Your Primary Care Provider in 5-7 days   Chóng M?t [Vertigo, Unknown Cause]  Lydia doroteo ?? ?c tìm th?y doroteo não phía jae màng nh? và lydia gi?a. Nó là m?t ph?n c? a lubna tâm th?ng b ?ng. B?nh c?a lydia odroteo gây nên s? chóng m?t - m?t c?m giác elgin l?c v? suzy?n ? ?ng. C? m giác nh? là qu ý v? ho? c c?n ph òng ? ang tl cu?ng. M? t c?n chóng m ?t có th? khi?n bu?n nôn ? ?t ng?t, ói m?a, ho?c ra horace?u m? hôi. S? chóng m?t tr?m tr?ng gây vi?c m? t th?ng b ?ng và có th? làm cho quý v? té ngã. Khi b? chóng m?t, nh?ng c? ? ?ng nh? c? a ? ?u và nh? ng thay ? ? i doroteo t? th ? c? a c? th ? th? ?ng s? làm cho các tri?u ch?ng t? h?n .    Nh?ng nguyên nhân c?a ch?ng chóng m?t tyson g?m:    Viêm lydia doroteo    B?nh c?a dây th? n kinh ? ?n lydia doroteo    Di ? ?ng c?a các h?t can xi (calcium) ? lydia doroteo    Máu ch?y ? ? n các lubna tâm th?ng b ?ng c?a não không ? ? y ? ?  M? t c?n chóng m ?t có th? kéo dài vài giây, vài phút ho?c vài gi?. M?t khi quý v? jose guadalupe kh? i c?n chóng m ?t l? n ? ?u, có th? nó không tyson gi? tr? l?i n?a. Lenka nhiên, ?ôi lúc các tri ?u ch?ng có th? tái phát doroteo horace?u tu?n ho? c lâu h?n , tùy vào nguyên nhân. Có th? b? ù lydia ho?c m?t thính l?c, có th? là t?m th?i ho?c v?nh vi?n.  Ch?m Sóc T?i Marisabel:  1) N?u các tri?u ch?ng tr?m tr?ng, hãy ngh? ng?i trên gi? ?ng m?t cách yên t?nh. Thay ? ? i t? th ? t? t?. Th? ?ng s? có m? t t? th ? mà quý v? c?m th?y t?t nh? t, nh? n ?m nghiêng ho?c n?m th? ng l?ng , ?  ?u h ? i nâng medellin b?ng g?i.  2) ? ?ng lái xe ho?c ?i ?u khi?n máy móc nguy hi?m doroteo vòng m?t tu?n l? jae khi không còn các tri?u ch?ng, l ? các tri?u ch?ng tl l? i ? ?t ng?t.  3) Dùng thu? c nh? ? ã ?? ?c ch? ? ? nh ? ? gi?m các tri?u ch?ng c?a quý v?. Tr? khi m?t lo?i thu? c khác ? ã ?? ?c ch? ? ? nh ? ? tr? bu?n nôn, ói m?a và chóng m?t, còn không thì quý v? có th? dùng thu?c viên tr? b?nh suzy? n ? ?ng bán không c?n toa , nh? meclizine (Bonine, Bonamine, Antivert) ho?c dimenhydrinate (Dramamine).  Ti?p T?c Rolf Dõi  v?i bác s? c?a quý v? ho? c rolf h? ?ng d?n c?a nhân viên tacos tônatalia. Cho bác s? bi?t n?u lydia quý v? v?n còn ù ho?c n?u thính l?c c?a quý v? không tr? l?i bình th ? ?ng.  N?u x?y ra b?t c? ?i?u nào jae ?ây hãy L?P T?C ?I?U TR? Y T?:  -- Ch?ng chóng m?t t? h?n và thu ?c ? ã ch? ? ?nh không ki? m soát ?? ?c  -- Ói m?a horace?u l?n không gi?m jae khi dùng thu? c ? ã ch? ? ?nh  -- Carter y?u ho?c ng?t x?u yovani t?ng  -- Nh? c ? ?u tr?m tr?ng ho?c bu?n ng? hay b?i r?i b? t th? ?ng  -- Y?u m?t cánh sasha ho?c m?t c?ng nav hay m?t bên m?t  -- Nói ho?c sawyer helio kh ?n    [phone] [email or web address]. [address] [place] [address]. All rights reserved. This information is not intended as a substitute for professional medical care. Always follow your healthcare professional's instructions.          24 Hour Appointment Hotline       To make an appointment at any [facility], call [phone] ([phone]). If you don't have a family doctor or clinic, we will help you find one. [facility] are conveniently located to serve the needs of you and your family.             Review of your medicines      START taking        Dose / Directions Last dose taken    meclizine 12.5 MG tablet   Commonly known as:  ANTIVERT   Dose:  12.5 mg   Quantity:  30 tablet        Take 1 tablet (12.5 mg) by mouth 4 times daily as needed for dizziness   Refills:  0          Our records show that you are taking the  medicines listed below. If these are incorrect, please call your family doctor or clinic.        Dose / Directions Last dose taken    IBUPROFEN PO   Dose:  200 mg   Indication:  Takes for headache PRN, only takes 1 tablet        Take 200 mg by mouth as needed for moderate pain Reported on 4/10/2017   Refills:  0        Multi-vitamin Tabs tablet   Dose:  1 tablet        Take 1 tablet by mouth daily   Refills:  0        VITAMIN D (CHOLECALCIFEROL) PO   Dose:  1000 Units        Take 1,000 Units by mouth daily   Refills:  0                Prescriptions were sent or printed at these locations (1 Prescription)                   Other Prescriptions                Printed at Department/Unit printer (1 of 1)         meclizine (ANTIVERT) 12.5 MG tablet                Procedures and tests performed during your visit     Basic metabolic panel    CBC with platelets differential    EKG 12-lead, tracing only      Orders Needing Specimen Collection     None      Pending Results     Date and Time Order Name Status Description    6/2/2017 0548 EKG 12-lead, tracing only Preliminary             Pending Culture Results     No orders found from 5/31/2017 to 6/3/2017.            Pending Results Instructions     If you had any lab results that were not finalized at the time of your Discharge, you can call the ED Lab Result RN at [phone]. You will be contacted by this team for any positive Lab results or changes in treatment. The nurses are available 7 days a week from 10A to 6:30P.  You can leave a message 24 hours per day and they will return your call.        Thank you for choosing [facility]       Thank you for choosing [facility] for your care. Our goal is always to provide you with excellent care. Hearing back from our patients is one way we can continue to improve our services. Please take a few minutes to complete the written survey that you may receive in the mail after you visit with us. Thank you!        MyChart Information      [employer] gives you secure access to your electronic health record. If you see a primary care provider, you can also send messages to your care team and make appointments. If you have questions, please call your primary care clinic.  If you do not have a primary care provider, please call [phone] and they will assist you.        Care EveryWhere ID     This is your Care EveryWhere ID. This could be used by other organizations to access your [facility] medical records  [id number]        After Visit Summary       This is your record. Keep this with you and show to your community pharmacist(s) and doctor(s) at your next visit.

## 2017-06-02 NOTE — DISCHARGE INSTRUCTIONS
Please make an appointment to follow up with Your Primary Care Provider in 5-7 days   Chóng M?t [Vertigo, Unknown Cause]  Lydia doroteo ?? ?c tìm th?y doroteo não phía jae màng nh? và lydia gi?a. Nó là m?t ph?n c? a lubna tâm th?ng b ?ng. B?nh c?a lydia doroteo gây nên s? chóng m?t - m?t c?m giác elgin l?c v? suzy?n ? ?ng. C? m giác nh? là qu ý v? ho? c c?n ph òng ? ang tl cu?ng. M? t c?n chóng m ?t có th? khi?n bu?n nôn ? ?t ng?t, ói m?a, ho?c ra horace?u m? hôi. S? chóng m?t tr?m tr?ng gây vi?c m? t th?ng b ?ng và có th? làm cho quý v? té ngã. Khi b? chóng m?t, nh?ng c? ? ?ng nh? c? a ? ?u và nh? ng thay ? ? i doroteo t? th ? c? a c? th ? th? ?ng s? làm cho các tri?u ch?ng t? h?n .    Nh?ng nguyên nhân c?a ch?ng chóng m?t tyson g?m:    Viêm lydia doroteo    B?nh c?a dây th? n kinh ? ?n lydia doroteo    Di ? ?ng c?a các h?t can xi (calcium) ? lydia doroteo    Máu ch?y ? ? n các lubna tâm th?ng b ?ng c?a não không ? ? y ? ?  M? t c?n chóng m ?t có th? kéo dài vài giây, vài phút ho?c vài gi?. M?t khi quý v? jose guadalupe kh? i c?n chóng m ?t l? n ? ?u, có th? nó không tyson gi? tr? l?i n?a. Lenka nhiên, ?ôi lúc các tri ?u ch?ng có th? tái phát doroteo horace?u tu?n ho? c lâu h?n , tùy vào nguyên nhân. Có th? b? ù lydia ho?c m?t thính l?c, có th? là t?m th?i ho?c v?nh vi?n.  Ch?m Sóc T?i Marisabel:  1) N?u các tri?u ch?ng tr?m tr?ng, hãy ngh? ng?i trên gi? ?ng m?t cách yên t?nh. Thay ? ? i t? th ? t? t?. Th? ?ng s? có m? t t? th ? mà quý v? c?m th?y t?t nh? t, nh? n ?m nghiêng ho?c n?m th? ng l?ng , ? ?u h ? i nâng medellin b?ng g?i.  2) ? ?ng lái xe ho?c ?i ?u khi?n máy móc nguy hi?m doroteo vòng m?t tu?n l? jae khi không còn các tri?u ch?ng, l ? các tri?u ch?ng tl l? i ? ?t ng?t.  3) Dùng thu? c nh? ? ã ?? ?c ch? ? ? nh ? ? gi?m các tri?u ch?ng c?a quý v?. Tr? khi m?t lo?i thu? c khác ? ã ?? ?c ch? ? ? nh ? ? tr? bu?n nôn, ói m?a và chóng m?t, còn không thì quý v? có th? dùng thu?c viên tr? b?nh suzy? n ? ?ng bán không c?n toa , nh? meclizine (Bonine, Bonamine, Antivert) ho?c  dimenhydrinate (Dramamine).  Ti?p T?c Rolf Dõi  v?i bác s? c?a quý v? ho? c rolf h? ?ng d?n c?a nhân viên chúng tôi. Cho bác s? bi?t n?u lydia quý v? v?n còn ù ho?c n?u thính l?c c?a quý v? không tr? l?i bình th ? ?ng.  N?u x?y ra b?t c? ?i?u nào jae ?ây hãy L?P T?C ?I?U TR? Y T?:  -- Ch?ng chóng m?t t? h?n và thu ?c ? ã ch? ? ?nh không ki? m soát ?? ?c  -- Ói m?a horace?u l?n không gi?m jae khi dùng thu? c ? ã ch? ? ?nh  -- Carter y?u ho?c ng?t x?u yovani t?ng  -- Nh? c ? ?u tr?m tr?ng ho?c bu?n ng? hay b?i r?i b? t th? ?ng  -- Y?u m?t cánh sasha ho?c m?t c?ng chân hay m?t bên m?t  -- Nói ho?c nhìn khó kh ?n    [id number] [employer]. [address] [place] [address], [place] [address]. All rights reserved. This information is not intended as a substitute for professional medical care. Always follow your healthcare professional's instructions.

## 2017-06-03 LAB — INTERPRETATION ECG - MUSE: NORMAL

## 2017-06-05 NOTE — PROGRESS NOTES
SUBJECTIVE:                                                    Talon SILVA Son is a 66 year old female who presents to clinic today for the following health issues:    ED/UC Followup:    Facility:  [facility]  Date of visit: 6/2/17  Reason for visit: Dizziness that started on 6/1. No weakness or numbness. No visual changes or difficulty speaking.   Current Status: Still dizzy         Problem list and histories reviewed & adjusted, as indicated.  Additional history: as documented    ROS:  C: NEGATIVE for fever, chills, change in weight  EYES: NEGATIVE for vision changes or irritation  E/M: NEGATIVE for ear, mouth and throat problems  R: NEGATIVE for significant cough or SOB  CV: NEGATIVE for chest pain, palpitations or peripheral edema  GI: NEGATIVE for abdominal pain, heartburn, or change in bowel habits  NEURO: NEGATIVE for behavior changes, dysesthesias, dysphagia, involuntary movements, gait disturbance, loss of consciousness, memory problems, paralysis , paresthesias , syncope, weakness  and vertigo  ENDOCRINE: NEGATIVE for temperature intolerance, skin/hair changes    Patient Active Problem List   Diagnosis     Ankle pain     Non-toxic multinodular goiter     CARDIOVASCULAR SCREENING; LDL GOAL LESS THAN 160     Advanced directives, counseling/discussion     Osteoporosis     Past Surgical History:   Procedure Laterality Date     APPENDECTOMY  09/17/12     COLONOSCOPY  10/2007    repeat 10 yrs     COLONOSCOPY  6/2014    repeat 10 yrs       Social History   Substance Use Topics     Smoking status: Never Smoker     Smokeless tobacco: Never Used     Alcohol use No     Family History   Problem Relation Age of Onset     HEART DISEASE Mother            Problem list, Medication list, Allergies, and Medical/Social/Surgical histories reviewed in [facility] and updated as appropriate.  Labs reviewed in EPIC    OBJECTIVE:                                                    /76  Pulse 66  Temp 99.1  F (37.3  C) (Oral)  Wt  94 lb (42.6 kg)  SpO2 99%  BMI 18.36 kg/m2 Body mass index is 18.36 kg/(m^2).   GEN: A&Ox4, NAD  Eyes: PERRLA, EOM's intact. No injection  EARS: External ear normal. Canal clear. TM normal  Face: no tenderness  Nose: no discharge, mucosal edema or injection  ORAL CAVITY/OROPHARYNX: No lesions to the lips, gums, or buccal mucosa. The mucosa is moist, no oral lesions or masses. Tongue is normal. No postnasal drip noted, no erythema or discharge.   NECK:supple, symmetric, no masses, normal without adenopathy, trachea is midline, thyroid not enlarged, full ROM. without TTP over cervical muscles without hypertonicity. The carotid pulses are full without bruit. There is no jugular venous distention.  NEUROLOGICALCRANIAL NERVES: , II - XII are normal, REFLEXES, DTRs are 1 - 2+ and symmetric in upper and lower extremities, PATHOLOGIC REFLEXES, grossly negative , SENSATION:, Grossly normal and symmetric, MOTOR FUNCTION:, Grossly normal strength , BALANCE AND GAIT , grossly normal , Rhomberg is negative, Finger to nose testing is normal, CORTICAL FUNCTION:, normal intellectual function, normal memory, good judgment, oriented x 3   Headshake: No nystagmus  Yue-Hallpike: positive   LUNGS:  Respiratory effort is normal without respiratory distress. The lungs are clear to auscultation.   HEART:  The heart is regular without murmur lifts, thrills, or heaves. No S3 or S4 is present. There is no jugular venous distention.   GAIT:normal including tandem walk, heel and toe walk.       ASSESSMENT/PLAN:                                                        ICD-10-CM    1. BPPV (benign paroxysmal positional vertigo), bilateral H81.13 PHYSICAL THERAPY REFERRAL     [facility] PT, HAND, AND CHIROPRACTIC REFERRAL       Patient Instructions   Call to schedule with physical therapy   Use zofran and meclizine as needed  Return to clinic for any new or worsening symptoms or go to ER Urgent care in off hours    Benign Positional Vertigo    The inner  ear is located behind the middle ear. It is a part of the balance center of the body. It contains small calcium particles within fluid filled canals (semi-circular canals). These particles can move out of position as a result of aging, head trauma or disease of the inner ear. Once that happens, movement of the head into certain positions may cause the particles to stimulate the inner ear and create the feeling of vertigo.  Vertigo is a false feeling of motion (as if you or the room is spinning). A vertigo attack may cause sudden nausea, vomiting and heavy sweating. Severe vertigo causes a loss of balance and may result in falling. During an attack of vertigo, head movement and body position changes will worsen symptoms.  An episode of vertigo may last seconds, minutes or hours. Once you are over the first episode of vertigo, it may never return. Sometimes symptoms recur off and on over several weeks or longer.  Home Care:    If symptoms are severe, rest quietly in bed. Change positions slowly. There is usually one position that will feel best, such as lying on one side or lying on your back with your head slightly raised on pillows.    Do not drive or work with dangerous machinery for one week after symptoms disappear, in case of a sudden return of symptoms.    Take medicine as prescribed to relieve your symptoms. Unless another medicine was prescribed for nausea, vomiting and vertigo, you may use over-the-counter motion sickness pills, such as meclizine (Bonine, Bonamine, Antivert) or dimenhydrinate (Dramamine).  Follow Up  with your doctor or as directed by our staff. Report any persistent ringing in the ear or hearing loss to your doctor.  [NOTE: If you had a CT or MRI scan, it will be reviewed by a specialist. You will be notified of any new findings that may affect your care.]  Get Prompt Medical Attention  if any of the following occur:    Worsening of vertigo not controlled by the medicine  prescribed    Repeated vomiting not controlled by the medicine prescribed    Increased weakness or fainting    Severe headache or unusual drowsiness or confusion    Weakness of an arm or leg or one side of the face    Difficulty with speech or vision    Seizure    [id number] [employer]. [address] [place] [address], [place] [address]. All rights reserved. This information is not intended as a substitute for professional medical care. Always follow your healthcare professional's instructions.              Estimated body mass index is 18.36 kg/(m^2) as calculated from the following:    Height as of 6/2/17: 5' (1.524 m).    Weight as of this encounter: 94 lb (42.6 kg).       Donan Larsen [facility]

## 2017-06-06 ENCOUNTER — OFFICE VISIT (OUTPATIENT)
Dept: FAMILY MEDICINE | Facility: CLINIC | Age: 67
End: 2017-06-06
Payer: COMMERCIAL

## 2017-06-06 VITALS
SYSTOLIC BLOOD PRESSURE: 124 MMHG | WEIGHT: 94 LBS | BODY MASS INDEX: 18.36 KG/M2 | DIASTOLIC BLOOD PRESSURE: 76 MMHG | HEART RATE: 66 BPM | OXYGEN SATURATION: 99 % | TEMPERATURE: 99.1 F

## 2017-06-06 DIAGNOSIS — H81.13 BPPV (BENIGN PAROXYSMAL POSITIONAL VERTIGO), BILATERAL: Primary | ICD-10-CM

## 2017-06-06 PROCEDURE — 99214 OFFICE O/P EST MOD 30 MIN: CPT | Performed by: PHYSICIAN ASSISTANT

## 2017-06-06 NOTE — MR AVS SNAPSHOT
After Visit Summary   6/6/2017    Talon SILVA Son    MRN: [id number]           Patient Information     Date Of Birth          1950        Visit Information        Provider Department      6/6/2017 7:40 AM Donna Canales PA-C [facility]        Today's Diagnoses     BPPV (benign paroxysmal positional vertigo), bilateral    -  1      Care Instructions    Call to schedule with physical therapy   Use zofran and meclizine as needed  Return to clinic for any new or worsening symptoms or go to ER Urgent care in off hours    Benign Positional Vertigo    The inner ear is located behind the middle ear. It is a part of the balance center of the body. It contains small calcium particles within fluid filled canals (semi-circular canals). These particles can move out of position as a result of aging, head trauma or disease of the inner ear. Once that happens, movement of the head into certain positions may cause the particles to stimulate the inner ear and create the feeling of vertigo.  Vertigo is a false feeling of motion (as if you or the room is spinning). A vertigo attack may cause sudden nausea, vomiting and heavy sweating. Severe vertigo causes a loss of balance and may result in falling. During an attack of vertigo, head movement and body position changes will worsen symptoms.  An episode of vertigo may last seconds, minutes or hours. Once you are over the first episode of vertigo, it may never return. Sometimes symptoms recur off and on over several weeks or longer.  Home Care:    If symptoms are severe, rest quietly in bed. Change positions slowly. There is usually one position that will feel best, such as lying on one side or lying on your back with your head slightly raised on pillows.    Do not drive or work with dangerous machinery for one week after symptoms disappear, in case of a sudden return of symptoms.    Take medicine as prescribed to relieve your symptoms. Unless  another medicine was prescribed for nausea, vomiting and vertigo, you may use over-the-counter motion sickness pills, such as meclizine (Bonine, Bonamine, Antivert) or dimenhydrinate (Dramamine).  Follow Up  with your doctor or as directed by our staff. Report any persistent ringing in the ear or hearing loss to your doctor.  [NOTE: If you had a CT or MRI scan, it will be reviewed by a specialist. You will be notified of any new findings that may affect your care.]  Get Prompt Medical Attention  if any of the following occur:    Worsening of vertigo not controlled by the medicine prescribed    Repeated vomiting not controlled by the medicine prescribed    Increased weakness or fainting    Severe headache or unusual drowsiness or confusion    Weakness of an arm or leg or one side of the face    Difficulty with speech or vision    Seizure    1610-3362 [employer]. [address] [place] [address]. All rights reserved. This information is not intended as a substitute for professional medical care. Always follow your healthcare professional's instructions.                Follow-ups after your visit        Additional Services     [facility] PT, HAND, AND CHIROPRACTIC REFERRAL       **This order will print in the [facility] Scheduling Office**    Physical Therapy, Hand Therapy and Chiropractic Care are available through:    *[facility]  *[facility]  *[facility]    Call one number to schedule at any of the above locations: [phone].    Your provider has referred you to: Physical Therapy at [facility] or [facility]    Indication/Reason for Referral: BPV  Onset of Illness: 6/2/17  Therapy Orders: Evaluate and Treat  Special Programs: Epley's maneuver   Special Request: Ish Gilliland      Additional Comments for the Therapist or Chiropractor:     Please be aware that coverage of these services is subject to the terms and limitations of your health insurance  "plan.  Call member services at your health plan with any benefit or coverage questions.      Please bring the following to your appointment:    *Your personal calendar for scheduling future appointments  *Comfortable clothing            PHYSICAL THERAPY REFERRAL       *This therapy referral will be filtered to a centralized scheduling office at [facility] and the patient will receive a call to schedule an appointment at a [facility] location most convenient for them. *     [facility] provides Physical Therapy evaluation and treatment and many specialty services across the [facility] system.  If requesting a specialty program, please choose from the list below.    If you have not heard from the scheduling office within 2 business days, please call [phone] for all locations, with the exception of [place], please call [phone].  Treatment: Evaluation & Treatment  Special Instructions/Modalities: Epley's maneuver   Special Programs: None    Please be aware that coverage of these services is subject to the terms and limitations of your health insurance plan.  Call member services at your health plan with any benefit or coverage questions.      **Note to Provider:  If you are referring outside of [facility] for the therapy appointment, please list the name of the location in the \"special instructions\" above, print the referral and give to the patient to schedule the appointment.                  Who to contact     If you have questions or need follow up information about today's clinic visit or your schedule please contact [facility] directly at [phone].  Normal or non-critical lab and imaging results will be communicated to you by MyChart, letter or phone within 4 business days after the clinic has received the results. If you do not hear from us within 7 days, please contact the clinic through MyChart or phone. If you have a critical or abnormal lab " result, we will notify you by phone as soon as possible.  Submit refill requests through [employer] or call your pharmacy and they will forward the refill request to us. Please allow 3 business days for your refill to be completed.          Additional Information About Your Visit        C[employer] Information     [employer] gives you secure access to your electronic health record. If you see a primary care provider, you can also send messages to your care team and make appointments. If you have questions, please call your primary care clinic.  If you do not have a primary care provider, please call [phone] and they will assist you.        Care EveryWhere ID     This is your Care EveryWhere ID. This could be used by other organizations to access your [facility] medical records  [id number]        Your Vitals Were     Pulse Temperature Pulse Oximetry BMI (Body Mass Index)          66 99.1  F (37.3  C) (Oral) 99% 18.36 kg/m2         Blood Pressure from Last 3 Encounters:   06/06/17 124/76   06/02/17 119/78   05/08/17 120/86    Weight from Last 3 Encounters:   06/06/17 94 lb (42.6 kg)   05/08/17 92 lb 9.6 oz (42 kg)   04/10/17 90 lb 3.2 oz (40.9 kg)              We Performed the Following     [facility] PT, HAND, AND CHIROPRACTIC REFERRAL     PHYSICAL THERAPY REFERRAL        Primary Care Provider Office Phone # Fax #    Donna Canales PA-C [phone]       [facility] [address] [facility] [address]  [facility] [address]        Thank you!     Thank you for choosing [facility]  for your care. Our goal is always to provide you with excellent care. Hearing back from our patients is one way we can continue to improve our services. Please take a few minutes to complete the written survey that you may receive in the mail after your visit with us. Thank you!             Your Updated Medication List - Protect others around you: Learn how to safely use, store and throw away your medicines at  www.disposemymeds.org.          This list is accurate as of: 6/6/17  7:49 AM.  Always use your most recent med list.                   Brand Name Dispense Instructions for use    IBUPROFEN PO      Take 200 mg by mouth as needed for moderate pain Reported on 4/10/2017       meclizine 12.5 MG tablet    ANTIVERT    30 tablet    Take 1 tablet (12.5 mg) by mouth 4 times daily as needed for dizziness       Multi-vitamin Tabs tablet      Take 1 tablet by mouth daily       VITAMIN D (CHOLECALCIFEROL) PO      Take 1,000 Units by mouth daily

## 2017-06-06 NOTE — NURSING NOTE
Chief Complaint   Patient presents with     ER F/U       Initial /76  Pulse 66  Temp 99.1  F (37.3  C) (Oral)  Wt 94 lb (42.6 kg)  SpO2 99%  BMI 18.36 kg/m2 Estimated body mass index is 18.36 kg/(m^2) as calculated from the following:    Height as of 6/2/17: 5' (1.524 m).    Weight as of this encounter: 94 lb (42.6 kg).  Medication Reconciliation: complete     Bel Blake MA

## 2017-06-06 NOTE — PATIENT INSTRUCTIONS
Call to schedule with physical therapy   Use zofran and meclizine as needed  Return to clinic for any new or worsening symptoms or go to ER Urgent care in off hours    Benign Positional Vertigo    The inner ear is located behind the middle ear. It is a part of the balance center of the body. It contains small calcium particles within fluid filled canals (semi-circular canals). These particles can move out of position as a result of aging, head trauma or disease of the inner ear. Once that happens, movement of the head into certain positions may cause the particles to stimulate the inner ear and create the feeling of vertigo.  Vertigo is a false feeling of motion (as if you or the room is spinning). A vertigo attack may cause sudden nausea, vomiting and heavy sweating. Severe vertigo causes a loss of balance and may result in falling. During an attack of vertigo, head movement and body position changes will worsen symptoms.  An episode of vertigo may last seconds, minutes or hours. Once you are over the first episode of vertigo, it may never return. Sometimes symptoms recur off and on over several weeks or longer.  Home Care:    If symptoms are severe, rest quietly in bed. Change positions slowly. There is usually one position that will feel best, such as lying on one side or lying on your back with your head slightly raised on pillows.    Do not drive or work with dangerous machinery for one week after symptoms disappear, in case of a sudden return of symptoms.    Take medicine as prescribed to relieve your symptoms. Unless another medicine was prescribed for nausea, vomiting and vertigo, you may use over-the-counter motion sickness pills, such as meclizine (Bonine, Bonamine, Antivert) or dimenhydrinate (Dramamine).  Follow Up  with your doctor or as directed by our staff. Report any persistent ringing in the ear or hearing loss to your doctor.  [NOTE: If you had a CT or MRI scan, it will be reviewed by a  specialist. You will be notified of any new findings that may affect your care.]  Get Prompt Medical Attention  if any of the following occur:    Worsening of vertigo not controlled by the medicine prescribed    Repeated vomiting not controlled by the medicine prescribed    Increased weakness or fainting    Severe headache or unusual drowsiness or confusion    Weakness of an arm or leg or one side of the face    Difficulty with speech or vision    Seizure    8058-0260 [employer]. [address] [place] [address], [place] [address]. All rights reserved. This information is not intended as a substitute for professional medical care. Always follow your healthcare professional's instructions.

## 2017-06-13 ENCOUNTER — THERAPY VISIT (OUTPATIENT)
Dept: PHYSICAL THERAPY | Facility: CLINIC | Age: 67
End: 2017-06-13
Attending: PHYSICIAN ASSISTANT

## 2017-06-13 DIAGNOSIS — H81.11 BPPV (BENIGN PAROXYSMAL POSITIONAL VERTIGO), RIGHT: Primary | ICD-10-CM

## 2017-06-13 NOTE — MR AVS SNAPSHOT
After Visit Summary   6/13/2017    Talon Myers    MRN: [id number]           Patient Information     Date Of Birth          1950        Visit Information        Provider Department      6/13/2017 8:00 AM Nohemy Valdez PT [facility]        Today's Diagnoses     BPPV (benign paroxysmal positional vertigo), right    -  1       Follow-ups after your visit        Your next 10 appointments already scheduled     Jun 21, 2017  4:00 PM CDT   Treatment with [facility] ([facility])    [address] [place] [address]  4th [facility] [address] [phone]              Who to contact     Please call your clinic at [phone] to:    Ask questions about your health    Make or cancel appointments    Discuss your medicines    Learn about your test results    Speak to your doctor   If you have compliments or concerns about an experience at your clinic, or if you wish to file a complaint, please contact [facility] Patient Relations at [phone] or email us at [email or web address]         Additional Information About Your Visit        MyChart Information     [employer] gives you secure access to your electronic health record. If you see a primary care provider, you can also send messages to your care team and make appointments. If you have questions, please call your primary care clinic.  If you do not have a primary care provider, please call [phone] and they will assist you.      [employer] is an electronic gateway that provides easy, online access to your medical records. With [employer], you can request a clinic appointment, read your test results, renew a prescription or communicate with your care team.     To access your existing account, please contact your [facility] or call [phone] for assistance.        Care EveryWhere ID     This is your Care EveryWhere ID. This could be  used by other organizations to access your [facility] medical records  [id number]         Blood Pressure from Last 3 Encounters:   06/06/17 124/76   06/02/17 119/78   05/08/17 120/86    Weight from Last 3 Encounters:   06/06/17 42.6 kg (94 lb)   05/08/17 42 kg (92 lb 9.6 oz)   04/10/17 40.9 kg (90 lb 3.2 oz)              Today, you had the following     No orders found for display       Primary Care Provider Office Phone # Fax #    Donna Canales PA-C [phone]       [facility] [address] [facility] [address]  [facility] [address]        Thank you!     Thank you for choosing [facility]  for your care. Our goal is always to provide you with excellent care. Hearing back from our patients is one way we can continue to improve our services. Please take a few minutes to complete the written survey that you may receive in the mail after your visit with us. Thank you!             Your Updated Medication List - Protect others around you: Learn how to safely use, store and throw away your medicines at www.disposemymeds.org.          This list is accurate as of: 6/13/17  8:00 PM.  Always use your most recent med list.                   Brand Name Dispense Instructions for use    IBUPROFEN PO      Take 200 mg by mouth as needed for moderate pain Reported on 4/10/2017       meclizine 12.5 MG tablet    ANTIVERT    30 tablet    Take 1 tablet (12.5 mg) by mouth 4 times daily as needed for dizziness       Multi-vitamin Tabs tablet      Take 1 tablet by mouth daily       VITAMIN D (CHOLECALCIFEROL) PO      Take 1,000 Units by mouth daily

## 2017-06-14 NOTE — PROGRESS NOTES
06/13/17 0700       Present No   General Information   Start of Care Date 06/13/17   Referring Physician CAMPOS Canales   Orders Evaluate and Treat as Indicated   Additional Orders Epley   Order Date 06/06/17   Medical Diagnosis BPPV   Onset of illness/injury or Date of Surgery 06/01/17  (to ER on 6/2 given meds:meclazine and zofran)   Surgical/Medical history reviewed Yes   Pertinent history of current vestibular problem (include personal factors and/or comorbidities that impact the POC)  Motion sickness  (even as a kid could only do 2 hours in car-vomit)   Pertinent history of current problem (include personal factors and/or comorbidities that impact the POC) Now when I lye down so dizzi and sit up so dizzi. I can walk OK.  It lasts for secodns. I have to close my eyese or more dizzi. Worse last week when it started.. No longer nausea. Cannot move very fast. No headaches. No hx of neck injury. Denies any ear symptoms. Is nto taking any of the meds prescribed in ER (meclazine and zofran).   Pertinent Visual History  glasses.    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 for >1 year   Fall Risk Screen   Fall screen completed by PT   Per patient - Fall 2 or more times in past year? No   Per patient - Fall with injury in past year? No   Is patient a fall risk? No   System Outcome Measures   Dizziness Handicap Inventory (score out of 100) A decrease in score by 17.18 or greater indicates a clinically significant change in symptoms. 44   Was BPPV resolved at end of session? No   Pain   Patient currently in pain No   Range of Motion (ROM)   ROM Comment TIFFANY OSBORNE including  retraction/protraction   Gait   Gait Comments ambs IND in/out of the clinic: normal speed and gait pattern   Infrared Goggle Exam or Frenzel Lense Exam   Vestibular Suppressant in Last 24 Hours? No   Exam completed with Infrared Goggles   Spontaneous Nystagmus Negative   Gaze Evoked Nystagmus Negative   Head Shake Horizontal Nystagmus Negative   Positional Testing comments Started with HSCC testing then DHs   Yue-Hallpike (right) Upbeating R torsional;Other   [place]oman-Hallpike (right) comments after the torsional (15 seconds) it looks like a small amplitude downbeating but no symptoms   [place]-Hallpike (Left) Negative   HSCC Supine Roll Test (Right) Upbeating R torsional   HS[facility] Supine Roll Test (Right) Comments lasted only 5 second.   HSCC Supine Roll Test (Left) Negative   BPPV Canal(s) R Posterior   BPPV Type Canalithasis   Clinical Impression   Criteria for Skilled Therapeutic Interventions Met yes, treatment indicated   PT Diagnosis right PSCC BPPV   Influenced by the following impairments positional vertigo   Functional limitations due to impairments affects IADLS, work   Clinical Presentation Stable/Uncomplicated   Clinical Presentation Rationale subjective info, IR goggle exam and clinical judgement   Clinical Decision Making (Complexity) Low complexity   Therapy Frequency 1 time/week   Predicted Duration of Therapy Intervention (days/wks) 2 months   Risk & Benefits of therapy have been explained Yes   Patient, Family & other staff in agreement with plan of care Yes   Clinical Impression Comments Right PSCC BPPV treated with 3 CRPS however she still had some nystagmus on the last CRP and it doesn't match up perfectly with simple BPPV. need to retest next week and treat again.   Goal 1   Goal Identifier Vertigo   Goal Description eliminate sensation of vertigo when getting in/out of bed, bending over, looking up   Target Date 08/13/17   Total Evaluation Time   Total Evaluation Time (Minutes) 18   Megan Valdez DPT, MPT,  NCS

## 2017-06-19 NOTE — PROGRESS NOTES
SUBJECTIVE:                                                    Talon SILVA Son is a 66 year old female who presents to clinic today for the following health issues:    Needs FMLA paperwork for missing work 3 days due to being too dizzy.   Reports today she is not dizzy but has been a few days ago. Went to therapy yesterday.   Scared to lay down at night because that is when she gets dizzy.  Missed work 6/2/17, 6/6/17, and 6/16/17. Says she has plenty of PTO but it is the policy if missed more than 3 days to have fmla filled out.    She qualifies for FMLA. Needs letter    Today she is feeling great. She was able to lie down last night and sleep without any problems. She is very happy about this        Problem list and histories reviewed & adjusted, as indicated.  Additional history: as documented    ROS:  C: NEGATIVE for fever, chills, change in weight  E/M: NEGATIVE for ear, mouth and throat problems  R: NEGATIVE for significant cough or SOB  CV: NEGATIVE for chest pain, palpitations or peripheral edema  NEURO: NEGATIVE for weakness, dizziness or paresthesias    Patient Active Problem List   Diagnosis     Ankle pain     Non-toxic multinodular goiter     CARDIOVASCULAR SCREENING; LDL GOAL LESS THAN 160     Advanced directives, counseling/discussion     Osteoporosis     BPPV (benign paroxysmal positional vertigo), unspecified laterality     Past Surgical History:   Procedure Laterality Date     APPENDECTOMY  09/17/12     COLONOSCOPY  10/2007    repeat 10 yrs     COLONOSCOPY  6/2014    repeat 10 yrs       Social History   Substance Use Topics     Smoking status: Never Smoker     Smokeless tobacco: Never Used     Alcohol use No     Family History   Problem Relation Age of Onset     HEART DISEASE Mother            Problem list, Medication list, Allergies, and Medical/Social/Surgical histories reviewed in [facility] and updated as appropriate.  Labs reviewed in EPIC    OBJECTIVE:                                                     /84  Pulse 83  Temp 98.3  F (36.8  C) (Oral)  Wt 93 lb 11.2 oz (42.5 kg)  SpO2 98%  BMI 18.3 kg/m2 Body mass index is 18.3 kg/(m^2).   GEN: A&Ox4, NAD  Eyes: PERRLA, EOM's intact. No injection  EARS: External ear normal. Canal clear. TM normal  Face: no tenderness  Nose: no discharge, mucosal edema or injection  ORAL CAVITY/OROPHARYNX: No lesions to the lips, gums, or buccal mucosa. The mucosa is moist, no oral lesions or masses. Tongue is normal. No postnasal drip noted, no erythema or discharge.   NECK:supple, symmetric, no masses,  NEUROLOGICALCRANIAL NERVES: , II - XII are normal, REFLEXES, DTRs are 1 - 2+ and symmetric in upper and lower extremities, PATHOLOGIC REFLEXES, grossly negative , SENSATION:, Grossly normal and symmetric, MOTOR FUNCTION:, Grossly normal strength   LUNGS:  Respiratory effort is normal without respiratory distress. The lungs are clear to auscultation.   HEART:  The heart is regular without murmur lifts, thrills, or heaves. No S3 or S4 is present. There is no jugular venous distention.   GAIT:normal including tandem walk, heel and toe walk.         ASSESSMENT/PLAN:                                                        ICD-10-CM    1. BPPV (benign paroxysmal positional vertigo), unspecified laterality H81.10      Improved. Letter written for intermittent time off. Return to clinic for any new or worsening symptoms or go to ER Urgent care in off hours        Estimated body mass index is 18.3 kg/(m^2) as calculated from the following:    Height as of 6/2/17: 5' (1.524 m).    Weight as of this encounter: 93 lb 11.2 oz (42.5 kg).       Donna Larsen [facility]  [facility]

## 2017-06-21 ENCOUNTER — THERAPY VISIT (OUTPATIENT)
Dept: PHYSICAL THERAPY | Facility: CLINIC | Age: 67
End: 2017-06-21

## 2017-06-21 DIAGNOSIS — H81.11 BPPV (BENIGN PAROXYSMAL POSITIONAL VERTIGO), RIGHT: Primary | ICD-10-CM

## 2017-06-22 ENCOUNTER — OFFICE VISIT (OUTPATIENT)
Dept: FAMILY MEDICINE | Facility: CLINIC | Age: 67
End: 2017-06-22
Payer: COMMERCIAL

## 2017-06-22 VITALS
BODY MASS INDEX: 18.3 KG/M2 | SYSTOLIC BLOOD PRESSURE: 124 MMHG | HEART RATE: 83 BPM | WEIGHT: 93.7 LBS | OXYGEN SATURATION: 98 % | TEMPERATURE: 98.3 F | DIASTOLIC BLOOD PRESSURE: 84 MMHG

## 2017-06-22 DIAGNOSIS — H81.10 BPPV (BENIGN PAROXYSMAL POSITIONAL VERTIGO), UNSPECIFIED LATERALITY: Primary | ICD-10-CM

## 2017-06-22 PROCEDURE — 99213 OFFICE O/P EST LOW 20 MIN: CPT | Performed by: PHYSICIAN ASSISTANT

## 2017-06-22 NOTE — LETTER
[address] [address]  [phone]  Dept: [phone]      6/22/2017    Re: Talno SILVA Son      TO WHOM IT MAY CONCERN:    Talon SILVA Son  was seen on 6/22/17.  She is suffering from paroxysmal benign positional vertigo causing intermittent leaves of absence. Please excuse her from missing work on 6/2/17, 6/6/17, 6/16/17 due to exacerbations. She may need some more time off intermittently should she have another episode. Please contact me for any concerns.      Cordially,          Donna Canales PA-C  [facility]

## 2017-06-22 NOTE — MR AVS SNAPSHOT
After Visit Summary   6/22/2017    Talon Myers    MRN: [id number]           Patient Information     Date Of Birth          1950        Visit Information        Provider Department      6/22/2017 10:20 AM Donna Canales PA-C [facility]        Today's Diagnoses     BPPV (benign paroxysmal positional vertigo), unspecified laterality    -  1       Follow-ups after your visit        Who to contact     If you have questions or need follow up information about today's clinic visit or your schedule please contact [facility] directly at [phone].  Normal or non-critical lab and imaging results will be communicated to you by StepOuthart, letter or phone within 4 business days after the clinic has received the results. If you do not hear from us within 7 days, please contact the clinic through Attention Pointt or phone. If you have a critical or abnormal lab result, we will notify you by phone as soon as possible.  Submit refill requests through AccessData or call your pharmacy and they will forward the refill request to us. Please allow 3 business days for your refill to be completed.          Additional Information About Your Visit        MyChart Information     AccessData gives you secure access to your electronic health record. If you see a primary care provider, you can also send messages to your care team and make appointments. If you have questions, please call your primary care clinic.  If you do not have a primary care provider, please call [phone] and they will assist you.        Care EveryWhere ID     This is your Care EveryWhere ID. This could be used by other organizations to access your [place] medical records  [id number]        Your Vitals Were     Pulse Temperature Pulse Oximetry BMI (Body Mass Index)          83 98.3  F (36.8  C) (Oral) 98% 18.3 kg/m2         Blood Pressure from Last 3 Encounters:   06/22/17 124/84   06/06/17 124/76   06/02/17 119/78     Weight from Last 3 Encounters:   06/22/17 93 lb 11.2 oz (42.5 kg)   06/06/17 94 lb (42.6 kg)   05/08/17 92 lb 9.6 oz (42 kg)              Today, you had the following     No orders found for display       Primary Care Provider Office Phone # Fax #    Donna Canales PA-C [phone]       [facility] [address]  [facility] [address]        Equal Access to Services     SAM ZAMORA : Hadii aad ku hadasho Soomaali, waaxda luqadaha, qaybta kaalmada adeegyada, waxay idiin hayaan adeeg kharavicente lieberman . So [facility] [phone].    ATENCIÓN: Si habla español, tiene a maxwell disposición servicios gratuitos de asistencia lingüística. Llame al [phone].    We comply with applicable federal civil rights laws and [place] laws. We do not discriminate on the basis of race, color, national origin, age, disability sex, sexual orientation or gender identity.            Thank you!     Thank you for choosing [facility]  for your care. Our goal is always to provide you with excellent care. Hearing back from our patients is one way we can continue to improve our services. Please take a few minutes to complete the written survey that you may receive in the mail after your visit with us. Thank you!             Your Updated Medication List - Protect others around you: Learn how to safely use, store and throw away your medicines at www.disposemymeds.org.          This list is accurate as of: 6/22/17 11:03 AM.  Always use your most recent med list.                   Brand Name Dispense Instructions for use Diagnosis    IBUPROFEN PO      Take 200 mg by mouth as needed for moderate pain Reported on 4/10/2017        meclizine 12.5 MG tablet    ANTIVERT    30 tablet    Take 1 tablet (12.5 mg) by mouth 4 times daily as needed for dizziness        Multi-vitamin Tabs tablet      Take 1 tablet by mouth daily        VITAMIN D (CHOLECALCIFEROL) PO      Take 1,000 Units by mouth daily

## 2017-06-22 NOTE — NURSING NOTE
Chief Complaint   Patient presents with     Dizziness       Initial /84  Pulse 83  Temp 98.3  F (36.8  C) (Oral)  Wt 93 lb 11.2 oz (42.5 kg)  SpO2 98%  BMI 18.3 kg/m2 Estimated body mass index is 18.3 kg/(m^2) as calculated from the following:    Height as of 6/2/17: 5' (1.524 m).    Weight as of this encounter: 93 lb 11.2 oz (42.5 kg).  Medication Reconciliation: complete     Bel Blake MA

## 2017-06-27 ENCOUNTER — THERAPY VISIT (OUTPATIENT)
Dept: PHYSICAL THERAPY | Facility: CLINIC | Age: 67
End: 2017-06-27

## 2017-06-27 ENCOUNTER — APPOINTMENT (OUTPATIENT)
Dept: MRI IMAGING | Facility: CLINIC | Age: 67
End: 2017-06-27
Attending: FAMILY MEDICINE
Payer: COMMERCIAL

## 2017-06-27 ENCOUNTER — HOSPITAL ENCOUNTER (EMERGENCY)
Facility: CLINIC | Age: 67
Discharge: HOME | End: 2017-06-27
Attending: FAMILY MEDICINE | Admitting: FAMILY MEDICINE
Payer: COMMERCIAL

## 2017-06-27 VITALS
SYSTOLIC BLOOD PRESSURE: 145 MMHG | RESPIRATION RATE: 17 BRPM | WEIGHT: 93.2 LBS | DIASTOLIC BLOOD PRESSURE: 83 MMHG | BODY MASS INDEX: 18.2 KG/M2 | OXYGEN SATURATION: 100 % | TEMPERATURE: 96.9 F | HEART RATE: 94 BPM

## 2017-06-27 DIAGNOSIS — H81.399 PERIPHERAL VERTIGO, UNSPECIFIED LATERALITY: ICD-10-CM

## 2017-06-27 DIAGNOSIS — H81.11 BPPV (BENIGN PAROXYSMAL POSITIONAL VERTIGO), RIGHT: ICD-10-CM

## 2017-06-27 DIAGNOSIS — R42 VERTIGO: Primary | ICD-10-CM

## 2017-06-27 DIAGNOSIS — I45.10 RIGHT BUNDLE BRANCH BLOCK: ICD-10-CM

## 2017-06-27 LAB
ALBUMIN SERPL-MCNC: 4 G/DL (ref 3.4–5)
ALP SERPL-CCNC: 47 U/L (ref 40–150)
ALT SERPL W P-5'-P-CCNC: 24 U/L (ref 0–50)
ANION GAP SERPL CALCULATED.3IONS-SCNC: 9 MMOL/L (ref 3–14)
AST SERPL W P-5'-P-CCNC: 23 U/L (ref 0–45)
BASOPHILS # BLD AUTO: 0 10E9/L (ref 0–0.2)
BASOPHILS NFR BLD AUTO: 0.3 %
BILIRUB SERPL-MCNC: 0.4 MG/DL (ref 0.2–1.3)
BUN SERPL-MCNC: 27 MG/DL (ref 7–30)
CALCIUM SERPL-MCNC: 9.6 MG/DL (ref 8.5–10.1)
CHLORIDE SERPL-SCNC: 109 MMOL/L (ref 94–109)
CO2 SERPL-SCNC: 24 MMOL/L (ref 20–32)
CREAT SERPL-MCNC: 0.76 MG/DL (ref 0.52–1.04)
CRP SERPL-MCNC: <2.9 MG/L (ref 0–8)
DIFFERENTIAL METHOD BLD: ABNORMAL
EOSINOPHIL # BLD AUTO: 0 10E9/L (ref 0–0.7)
EOSINOPHIL NFR BLD AUTO: 0.1 %
ERYTHROCYTE [DISTWIDTH] IN BLOOD BY AUTOMATED COUNT: 11.9 % (ref 10–15)
ERYTHROCYTE [SEDIMENTATION RATE] IN BLOOD BY WESTERGREN METHOD: 31 MM/H (ref 0–30)
GFR SERPL CREATININE-BSD FRML MDRD: 76 ML/MIN/1.7M2
GLUCOSE SERPL-MCNC: 115 MG/DL (ref 70–99)
HCT VFR BLD AUTO: 35.6 % (ref 35–47)
HGB BLD-MCNC: 12.1 G/DL (ref 11.7–15.7)
IMM GRANULOCYTES # BLD: 0 10E9/L (ref 0–0.4)
IMM GRANULOCYTES NFR BLD: 0.3 %
LYMPHOCYTES # BLD AUTO: 0.7 10E9/L (ref 0.8–5.3)
LYMPHOCYTES NFR BLD AUTO: 9.2 %
MCH RBC QN AUTO: 32.7 PG (ref 26.5–33)
MCHC RBC AUTO-ENTMCNC: 34 G/DL (ref 31.5–36.5)
MCV RBC AUTO: 96 FL (ref 78–100)
MONOCYTES # BLD AUTO: 0.2 10E9/L (ref 0–1.3)
MONOCYTES NFR BLD AUTO: 2.1 %
NEUTROPHILS # BLD AUTO: 6.2 10E9/L (ref 1.6–8.3)
NEUTROPHILS NFR BLD AUTO: 88 %
NRBC # BLD AUTO: 0 10*3/UL
NRBC BLD AUTO-RTO: 0 /100
PLATELET # BLD AUTO: 205 10E9/L (ref 150–450)
POTASSIUM SERPL-SCNC: 4 MMOL/L (ref 3.4–5.3)
PROT SERPL-MCNC: 8.1 G/DL (ref 6.8–8.8)
RBC # BLD AUTO: 3.7 10E12/L (ref 3.8–5.2)
SODIUM SERPL-SCNC: 142 MMOL/L (ref 133–144)
WBC # BLD AUTO: 7.1 10E9/L (ref 4–11)

## 2017-06-27 PROCEDURE — 70544 MR ANGIOGRAPHY HEAD W/O DYE: CPT

## 2017-06-27 PROCEDURE — 85652 RBC SED RATE AUTOMATED: CPT | Performed by: FAMILY MEDICINE

## 2017-06-27 PROCEDURE — 93005 ELECTROCARDIOGRAM TRACING: CPT | Performed by: FAMILY MEDICINE

## 2017-06-27 PROCEDURE — 96360 HYDRATION IV INFUSION INIT: CPT | Mod: 59 | Performed by: FAMILY MEDICINE

## 2017-06-27 PROCEDURE — 93010 ELECTROCARDIOGRAM REPORT: CPT | Mod: Z6 | Performed by: FAMILY MEDICINE

## 2017-06-27 PROCEDURE — 86140 C-REACTIVE PROTEIN: CPT | Performed by: FAMILY MEDICINE

## 2017-06-27 PROCEDURE — 70553 MRI BRAIN STEM W/O & W/DYE: CPT

## 2017-06-27 PROCEDURE — A9585 GADOBUTROL INJECTION: HCPCS | Performed by: FAMILY MEDICINE

## 2017-06-27 PROCEDURE — 99285 EMERGENCY DEPT VISIT HI MDM: CPT | Mod: 25 | Performed by: FAMILY MEDICINE

## 2017-06-27 PROCEDURE — 70549 MR ANGIOGRAPH NECK W/O&W/DYE: CPT

## 2017-06-27 PROCEDURE — 25000128 H RX IP 250 OP 636: Performed by: FAMILY MEDICINE

## 2017-06-27 PROCEDURE — 80053 COMPREHEN METABOLIC PANEL: CPT | Performed by: FAMILY MEDICINE

## 2017-06-27 PROCEDURE — 85025 COMPLETE CBC W/AUTO DIFF WBC: CPT | Performed by: FAMILY MEDICINE

## 2017-06-27 RX ORDER — GADOBUTROL 604.72 MG/ML
2 INJECTION INTRAVENOUS ONCE
Status: DISCONTINUED | OUTPATIENT
Start: 2017-06-27 | End: 2017-06-27 | Stop reason: CLARIF

## 2017-06-27 RX ORDER — LORAZEPAM 0.5 MG/1
0.25 TABLET ORAL EVERY 8 HOURS PRN
Qty: 10 TABLET | Refills: 0 | Status: SHIPPED | OUTPATIENT
Start: 2017-06-27 | End: 2018-10-11

## 2017-06-27 RX ORDER — GADOBUTROL 604.72 MG/ML
7.5 INJECTION INTRAVENOUS ONCE
Status: COMPLETED | OUTPATIENT
Start: 2017-06-27 | End: 2017-06-27

## 2017-06-27 RX ADMIN — GADOBUTROL 4.5 ML: 604.72 INJECTION INTRAVENOUS at 11:33

## 2017-06-27 RX ADMIN — SODIUM CHLORIDE 30 ML: 9 INJECTION, SOLUTION INTRAVENOUS at 11:22

## 2017-06-27 ASSESSMENT — ENCOUNTER SYMPTOMS
SINUS PRESSURE: 0
WEAKNESS: 0
DIZZINESS: 1
HEADACHES: 0
NUMBNESS: 0
TROUBLE SWALLOWING: 0
SPEECH DIFFICULTY: 0

## 2017-06-27 NOTE — MR AVS SNAPSHOT
After Visit Summary   6/27/2017    Talon SILVA Son    MRN: [id number]           Patient Information     Date Of Birth          1950        Visit Information        Provider Department      6/27/2017 8:30 AM Nohemy Valdez PT [facility]        Today's Diagnoses     Vertigo    -  1    BPPV (benign paroxysmal positional vertigo), right           Follow-ups after your visit        Your next 10 appointments already scheduled     Jun 28, 2017  4:00 PM CDT   Treatment 45 with [facility] ([facility])    [address] [facility]  [address]th [facility] [id number] [phone]            Jul 03, 2017  3:40 PM CDT   Office Visit with Donna Canales PA-C   [facility] ([facility]    [address] [place] [address] [phone]           Bring a current list of meds and any records pertaining to this visit.  For Physicals, please bring immunization records and any forms needing to be filled out.  Please arrive 10 minutes early to complete paperwork.              Who to contact     Please call your clinic at [phone] to:    Ask questions about your health    Make or cancel appointments    Discuss your medicines    Learn about your test results    Speak to your doctor   If you have compliments or concerns about an experience at your clinic, or if you wish to file a complaint, please contact [facility] Patient Relations at [phone] or email us at [email or web address]         Additional Information About Your Visit        MyChart Information     [employer] gives you secure access to your electronic health record. If you see a primary care provider, you can also send messages to your care team and make appointments. If you have questions, please call your primary care clinic.  If you do not have a primary care provider, please call [phone]  and they will assist you.      DealerTrack is an electronic gateway that provides easy, online access to your medical records. With DealerTrack, you can request a clinic appointment, read your test results, renew a prescription or communicate with your care team.     To access your existing account, please contact your [facility] or call [phone] for assistance.        Care EveryWhere ID     This is your Care EveryWhere ID. This could be used by other organizations to access your [place] medical records  [id number]         Blood Pressure from Last 3 Encounters:   06/27/17 145/83   06/22/17 124/84   06/06/17 124/76    Weight from Last 3 Encounters:   06/27/17 42.3 kg (93 lb 3.2 oz)   06/22/17 42.5 kg (93 lb 11.2 oz)   06/06/17 42.6 kg (94 lb)              Today, you had the following     No orders found for display       Primary Care Provider Office Phone # Fax #    Donna Canales PA-C [phone]       [facility] [address] [address]        Equal Access to Services     [facility] : Hadii aad ku hadasho Soomaali, waaxda luqadaha, qaybta kaalmada adeegyada, cristopher mackeyin haynarcison alexia lieberman . So [facility] [phone].    ATENCIÓN: Si habla español, tiene a maxwell disposición servicios gratuitos de asistencia lingüística. Kellee al [phone].    We comply with applicable federal civil rights laws and [place] laws. We do not discriminate on the basis of race, color, national origin, age, disability sex, sexual orientation or gender identity.            Thank you!     Thank you for choosing [facility]  for your care. Our goal is always to provide you with excellent care. Hearing back from our patients is one way we can continue to improve our services. Please take a few minutes to complete the written survey that you may receive in the mail after your visit with us. Thank you!             Your Updated Medication List -  Protect others around you: Learn how to safely use, store and throw away your medicines at www.disposemymeds.org.          This list is accurate as of: 6/27/17  8:54 PM.  Always use your most recent med list.                   Brand Name Dispense Instructions for use Diagnosis    dimenhyDRINATE 50 MG Chew      Take 50 mg by mouth every 6 hours as needed for sleep        IBUPROFEN PO      Take 200 mg by mouth as needed for moderate pain Reported on 4/10/2017        LORazepam 0.5 MG tablet    ATIVAN    10 tablet    Take 0.5 tablets (0.25 mg) by mouth every 8 hours as needed for other (vertigo)        meclizine 12.5 MG tablet    ANTIVERT    30 tablet    Take 1 tablet (12.5 mg) by mouth 4 times daily as needed for dizziness        Multi-vitamin Tabs tablet      Take 1 tablet by mouth daily        VITAMIN D (CHOLECALCIFEROL) PO      Take 1,000 Units by mouth daily

## 2017-06-27 NOTE — ED AVS SNAPSHOT
[facility], Emergency Department    [address]    [place] [address]    Phone:  [phone]    Fax:  [phone]                                       Talon SILVA Son   MRN: [id number]    Department:  [facility], Emergency Department   Date of Visit:  6/27/2017           Patient Information     Date Of Birth          1950        Your diagnoses for this visit were:     Peripheral vertigo, unspecified laterality        You were seen by Yosef Bernabe MD.        Discharge Instructions           Dizziness (Vertigo) and Balance Problems: Staying Safe     Replace burned-out lightbulbs to keep your home safe and well lit.   Falls or accidents can lead to pain, broken bones, and fear of future falls. Protect yourself and others by preparing for episodes. Simple steps can help you stay safe at home and wherever you go.  Lighting  Keep all areas well lit. This helps your eyes send the right signals to the brain. It also makes you less likely to trip and fall. If bright lights make symptoms worse, dim the lights or lie in a dark room until the dizziness passes. Then turn the lights back to their normal level.  Tips:    Keep a flashlight by the bed.    Place nightlights in bathrooms and hallways.    Replace burned-out bulbs, or have someone replace them for you.  Preventing falls  To reduce your risk of falling:    Get out of bed or up from a chair slowly.    Wear low-heeled shoes that fit properly and have slip-resistant soles.    Remove throw rugs. Clear clutter from walkways.    Use handrails on stairs. Have handrails installed or adjusted if needed.    Install grab bars in the bathroom. Don't use towel racks for balance.    Use a shower stool. Also put adhesive strips in the shower or on the tub floor.  Going out  With a little time and preparation, you can get around safely.  Tips:    Bring a cane or walking aid if needed.    Give yourself plenty of time in case you start to get dizzy.    Ask your  healthcare provider what type of exercise is safe for your condition.    Be patient. If an activity such as walking through a crowded shop causes you stress, you may not be ready for it yet.  Driving  If you become dizzy or disoriented while driving, you could hurt yourself and others. That's why it's best to not drive until symptoms have gone away. In some cases, your license may be temporarily held until it's safe for you to drive again.  For safety:    Ask a friend to drive for you.    Take public transportation.    Walk to stores and other places when you can.  Asking for help  Don't be afraid to ask for help running errands, cooking meals, and doing exercise. Whether it's a friend, loved one, neighbor, or stranger on the street, a little help can make a world of difference.   Date Last Reviewed: 11/1/2016 2000-2017 [employer]. [address] [place] [address]. All rights reserved. This information is not intended as a substitute for professional medical care. Always follow your healthcare professional's instructions.          Managing Dizziness (Vertigo) with Medicines    Although medicines can't cure your problem, they can help control symptoms. Your doctor may prescribe medicines for a few weeks and then taper them off. Always take your medicine as prescribed. Never share your medicine with others.  Contact your healthcare provider right away if you have side effects from your medicines.   How medicines can help    Treat infection or inflammation. If you have an infection caused by bacteria, your doctor can prescribe antibiotics.    Limit conflicting balance signals. These medicines are often in pill form.    Ease nausea. Suppositories, pills, or shots can reduce vomiting.    Reduce pressure in the canals. Diuretics can be used to treat Meniere's disease. These medicines help your body get rid of extra fluid.    Ease other symptoms. Other medicines can help ease depression and  anxiety caused by living with dizziness or fainting.  Date Last Reviewed: 11/1/2016 2000-2017 [employer]. [address] [place] [address], [place] [address]. All rights reserved. This information is not intended as a substitute for professional medical care. Always follow your healthcare professional's instructions.          Dizziness (Vertigo) and Balance Problems: Diagnostic Tests    An otolaryngologist (also called an ENT) is a doctor who specializes in disorders of the ear, nose, and throat. Your ENT can help find clues to the cause of your dizziness. He or she will examine you and go over your health history. Your ENT may also order certain tests to help diagnose your problem.  Hearing testing  In most cases, you will be referred for hearing testing. This is because the nerve that sends balance signals also sends hearing signals. A problem that affects balance can also affect hearing.  Other tests  Your doctor may recommend more than one kind of test. The following tests are painless, but may cause dizziness in some cases.    MRI creates images of the ear or head. A magnetic field and contrast medium are used to make the image.    Electronystagmography (ENG) records eye movement. Small electrodes are put on the skin around your eyes. Then your ear is filled with warm or cold water.    Rotation tests show the relationship between the inner ear and your eyes. You may be asked to wear special goggles or sit in a computerized chair.    Posturography tests your standing balance under different conditions. You will stand on a platform that measures shifts in your body weight.     Electrocochleography (ECoG) measures the fluid pressure in the inner ear. An abnormal ECoG may mean you have Meniere's disease or other conditions.    Vestibular evoked myogenic potentials (VEMPs) may be used if your healthcare provider suspects a rare condition like superior semicircular canal dehiscence. Electrodes are placed on  your neck, and you hear clicks in your ear.  Date Last Reviewed: 11/1/2016 2000-2017 [employer]. [address] [place] [address], [place] [address]. All rights reserved. This information is not intended as a substitute for professional medical care. Always follow your healthcare professional's instructions.      Thank you for choosing [facility].     Please closely monitor for further symptoms. Return to the Emergency Department if you develop any new or worsening signs or symptoms.    If you received any opiate pain medications or sedatives during your visit, please do not drive for at least 8 hours.     Labs, cultures or final xray interpretations may still need to be reviewed.  We will call you if your plan of care needs to be changed.    Please follow up with your primary care physician or clinic and with [phone] as soon as possible.  Continue your vestibular therapy.  Per Nohemy Valdez, there may be an appointment for you tomorrow.        24 Hour Appointment Hotline       To make an appointment at any [facility], call [phone] ([phone]). If you don't have a family doctor or clinic, we will help you find one. [place] clinics are conveniently located to serve the needs of you and your family.             Review of your medicines      START taking        Dose / Directions Last dose taken    LORazepam 0.5 MG tablet   Commonly known as:  ATIVAN   Dose:  0.25 mg   Quantity:  10 tablet        Take 0.5 tablets (0.25 mg) by mouth every 8 hours as needed for other (vertigo)   Refills:  0          Our records show that you are taking the medicines listed below. If these are incorrect, please call your family doctor or clinic.        Dose / Directions Last dose taken    dimenhyDRINATE 50 MG Chew   Dose:  50 mg        Take 50 mg by mouth every 6 hours as needed for sleep   Refills:  0        IBUPROFEN PO   Dose:  200 mg   Indication:  Takes for headache PRN,  only takes 1 tablet        Take 200 mg by mouth as needed for moderate pain Reported on 4/10/2017   Refills:  0        meclizine 12.5 MG tablet   Commonly known as:  ANTIVERT   Dose:  12.5 mg   Quantity:  30 tablet        Take 1 tablet (12.5 mg) by mouth 4 times daily as needed for dizziness   Refills:  0        Multi-vitamin Tabs tablet   Dose:  1 tablet        Take 1 tablet by mouth daily   Refills:  0        VITAMIN D (CHOLECALCIFEROL) PO   Dose:  1000 Units        Take 1,000 Units by mouth daily   Refills:  0                Prescriptions were sent or printed at these locations (1 Prescription)                   Other Prescriptions                Printed at Department/Unit printer (1 of 1)         LORazepam (ATIVAN) 0.5 MG tablet                Procedures and tests performed during your visit     CBC with platelets differential    CRP inflammation    Comprehensive metabolic panel    EKG 12 lead    Erythrocyte sedimentation rate auto    MR Brain w/o & w Contrast    MRA Angiogram Head w/o Contrast    MRA Angiogram Neck w/o & w Contrast      Orders Needing Specimen Collection     None      Pending Results     No orders found from 6/25/2017 to 6/28/2017.            Pending Culture Results     No orders found from 6/25/2017 to 6/28/2017.            Pending Results Instructions     If you had any lab results that were not finalized at the time of your Discharge, you can call the ED Lab Result RN at [phone]. You will be contacted by this team for any positive Lab results or changes in treatment. The nurses are available 7 days a week from 10A to 6:30P.  You can leave a message 24 hours per day and they will return your call.        Thank you for choosing [facility]       Thank you for choosing [place] for your care. Our goal is always to provide you with excellent care. Hearing back from our patients is one way we can continue to improve our services. Please take a few minutes to complete the written survey that you  may receive in the mail after you visit with us. Thank you!        [employer] Information     [employer] gives you secure access to your electronic health record. If you see a primary care provider, you can also send messages to your care team and make appointments. If you have questions, please call your primary care clinic.  If you do not have a primary care provider, please call [phone] and they will assist you.        Care EveryWhere ID     This is your Care EveryWhere ID. This could be used by other organizations to access your [place] medical records  [id number]        Equal Access to Services     [facility] : Shravan Guy, tomasa abdalla, blake gonzalez, cristopher lieberman . So [facility] [phone].    ATENCIÓN: Si habla español, tiene a maxwell disposición servicios gratuitos de asistencia lingüística. Llame al [phone].    We comply with applicable federal civil rights laws and [place] laws. We do not discriminate on the basis of race, color, national origin, age, disability sex, sexual orientation or gender identity.            After Visit Summary       This is your record. Keep this with you and show to your community pharmacist(s) and doctor(s) at your next visit.

## 2017-06-27 NOTE — ED NOTES
Patient presents to the ED with c/o dizziness, reports she was diagnosed with vertigo at the beginning of June and has been going to therapy.  Patient reports increased dizziness x 5 days, states therapy referred her to the ED for a head CT.  Patient ambulatory into department, gait slightly unsteady.

## 2017-06-27 NOTE — DISCHARGE INSTRUCTIONS
Dizziness (Vertigo) and Balance Problems: Staying Safe     Replace burned-out lightbulbs to keep your home safe and well lit.   Falls or accidents can lead to pain, broken bones, and fear of future falls. Protect yourself and others by preparing for episodes. Simple steps can help you stay safe at home and wherever you go.  Lighting  Keep all areas well lit. This helps your eyes send the right signals to the brain. It also makes you less likely to trip and fall. If bright lights make symptoms worse, dim the lights or lie in a dark room until the dizziness passes. Then turn the lights back to their normal level.  Tips:    Keep a flashlight by the bed.    Place nightlights in bathrooms and hallways.    Replace burned-out bulbs, or have someone replace them for you.  Preventing falls  To reduce your risk of falling:    Get out of bed or up from a chair slowly.    Wear low-heeled shoes that fit properly and have slip-resistant soles.    Remove throw rugs. Clear clutter from walkways.    Use handrails on stairs. Have handrails installed or adjusted if needed.    Install grab bars in the bathroom. Don't use towel racks for balance.    Use a shower stool. Also put adhesive strips in the shower or on the tub floor.  Going out  With a little time and preparation, you can get around safely.  Tips:    Bring a cane or walking aid if needed.    Give yourself plenty of time in case you start to get dizzy.    Ask your healthcare provider what type of exercise is safe for your condition.    Be patient. If an activity such as walking through a crowded shop causes you stress, you may not be ready for it yet.  Driving  If you become dizzy or disoriented while driving, you could hurt yourself and others. That's why it's best to not drive until symptoms have gone away. In some cases, your license may be temporarily held until it's safe for you to drive again.  For safety:    Ask a friend to drive for you.    Take public  transportation.    Walk to stores and other places when you can.  Asking for help  Don't be afraid to ask for help running errands, cooking meals, and doing exercise. Whether it's a friend, loved one, neighbor, or stranger on the street, a little help can make a world of difference.   Date Last Reviewed: 11/1/2016 2000-2017 The [employer]. [address] [place] [address]. All rights reserved. This information is not intended as a substitute for professional medical care. Always follow your healthcare professional's instructions.          Managing Dizziness (Vertigo) with Medicines    Although medicines can't cure your problem, they can help control symptoms. Your doctor may prescribe medicines for a few weeks and then taper them off. Always take your medicine as prescribed. Never share your medicine with others.  Contact your healthcare provider right away if you have side effects from your medicines.   How medicines can help    Treat infection or inflammation. If you have an infection caused by bacteria, your doctor can prescribe antibiotics.    Limit conflicting balance signals. These medicines are often in pill form.    Ease nausea. Suppositories, pills, or shots can reduce vomiting.    Reduce pressure in the canals. Diuretics can be used to treat Meniere's disease. These medicines help your body get rid of extra fluid.    Ease other symptoms. Other medicines can help ease depression and anxiety caused by living with dizziness or fainting.  Date Last Reviewed: 11/1/2016 2000-2017 [employer] [address] [place] [address]. All rights reserved. This information is not intended as a substitute for professional medical care. Always follow your healthcare professional's instructions.          Dizziness (Vertigo) and Balance Problems: Diagnostic Tests    An otolaryngologist (also called an ENT) is a doctor who specializes in disorders of the ear, nose, and throat.  Your ENT can help find clues to the cause of your dizziness. He or she will examine you and go over your health history. Your ENT may also order certain tests to help diagnose your problem.  Hearing testing  In most cases, you will be referred for hearing testing. This is because the nerve that sends balance signals also sends hearing signals. A problem that affects balance can also affect hearing.  Other tests  Your doctor may recommend more than one kind of test. The following tests are painless, but may cause dizziness in some cases.    MRI creates images of the ear or head. A magnetic field and contrast medium are used to make the image.    Electronystagmography (ENG) records eye movement. Small electrodes are put on the skin around your eyes. Then your ear is filled with warm or cold water.    Rotation tests show the relationship between the inner ear and your eyes. You may be asked to wear special goggles or sit in a computerized chair.    Posturography tests your standing balance under different conditions. You will stand on a platform that measures shifts in your body weight.     Electrocochleography (ECoG) measures the fluid pressure in the inner ear. An abnormal ECoG may mean you have Meniere's disease or other conditions.    Vestibular evoked myogenic potentials (VEMPs) may be used if your healthcare provider suspects a rare condition like superior semicircular canal dehiscence. Electrodes are placed on your neck, and you hear clicks in your ear.  Date Last Reviewed: 11/1/2016 2000-2017 The "[employer]. [address] [place] [address], [place] [address]. All rights reserved. This information is not intended as a substitute for professional medical care. Always follow your healthcare professional's instructions.      Thank you for choosing [facility].     Please closely monitor for further symptoms. Return to the Emergency Department if you develop any new or worsening signs  or symptoms.    If you received any opiate pain medications or sedatives during your visit, please do not drive for at least 8 hours.     Labs, cultures or final xray interpretations may still need to be reviewed.  We will call you if your plan of care needs to be changed.    Please follow up with your primary care physician or clinic and with [phone] as soon as possible.  Continue your vestibular therapy.  Per Nohemy Valdez, there may be an appointment for you tomorrow.

## 2017-06-27 NOTE — ED PROVIDER NOTES
History     Chief Complaint   Patient presents with     Dizziness     Patient seen earlier this month and diagnosed with vertigo, has been seeing therapy for vertigo but reports increased dizziness with ambulation x5 days.  Patient referred to ED per therapy for a CT.     LALO SILVA Son is a 66 year old female, recently diagnosed with vertigo, who presents with dizziness. Of note, the patient was evaluated here in the ED on 6/2/17 for similar complaints of dizziness, and at that time she had a normal EKG and negative lab workup. The dizziness was felt to be benign positional vertigo, and she was discharged on Zofran and meclizine. The patient reports that she has been doing therapy for her vertigo, and has had improvement in the dizziness temporarily after the therapy sessions. However, she reports that after laying down to sleep, the spinning dizziness exacerbates again, and generally worsens over the next couple days. She notes that the dizziness subsides if she sleeps sitting upright, and worsens with turning her head or laying down. She reports that she has had worsening spinning dizziness since Friday, 4 days ago. The patient did go to her vestibular therapy today, and was referred here to the ED for further evaluation. The patient's vestibular therapist contacted me and informed me that they were able to visualize downbeat vertical nystagmus using goggles.     Currently, she complains of difficulty walking due to the dizziness. She denies any headache, tinnitus, hearing loss, sinus pressure, congestion, vision changes, difficulty swallowing, speech changes or focal numbness, tingling or weakness, chest pain or palpitations, syncope or presyncope. She denies any history of diabetes, hypertension or cardiac problems. She reports a history of thyroid disease, though she is not on any medication for this. The patient has never smoked.     Past Medical History:   Diagnosis Date     Thyroid disease      Vertigo         Past Surgical History:   Procedure Laterality Date     APPENDECTOMY  09/17/12     COLONOSCOPY  10/2007    repeat 10 yrs     COLONOSCOPY  6/2014    repeat 10 yrs       Family History   Problem Relation Age of Onset     HEART DISEASE Mother        Social History   Substance Use Topics     Smoking status: Never Smoker     Smokeless tobacco: Never Used     Alcohol use No     No current facility-administered medications for this encounter.      Current Outpatient Prescriptions   Medication     dimenhyDRINATE 50 MG CHEW     LORazepam (ATIVAN) 0.5 MG tablet     VITAMIN D, CHOLECALCIFEROL, PO     multivitamin, therapeutic with minerals (MULTI-VITAMIN) TABS     meclizine (ANTIVERT) 12.5 MG tablet     IBUPROFEN PO        Allergies   Allergen Reactions     Acetaminophen      tylenol     Aspirin [Dihydroxyaluminum Aminoacetate]       I have reviewed the Medications, Allergies, Past Medical and Surgical History, and Social History in the Epic system.    Review of Systems   HENT: Negative for congestion, hearing loss, sinus pressure, tinnitus and trouble swallowing.    Eyes: Negative for visual disturbance.   Musculoskeletal: Positive for gait problem (secondary to dizziness).   Neurological: Positive for dizziness. Negative for speech difficulty, weakness, numbness and headaches.   All other systems reviewed and are negative.      Physical Exam   BP: 139/86  Pulse: 94  Temp: 97.6  F (36.4  C)  Resp: 20  Weight: 42.3 kg (93 lb 3.2 oz)  SpO2: 100 %  Physical Exam   Constitutional: She is oriented to person, place, and time. She appears well-developed and well-nourished.   HENT:   Head: Normocephalic and atraumatic.   Mouth/Throat: Oropharynx is clear and moist.   Eyes: Pupils are equal, round, and reactive to light. Right eye exhibits nystagmus. Left eye exhibits nystagmus.   I am not able to appreciate any vertical nystagmus on my exam   Neck: Normal range of motion. Neck supple. No JVD present. No tracheal deviation  present. No thyromegaly present.   Cardiovascular: Normal rate, regular rhythm, normal heart sounds and intact distal pulses.  Exam reveals no gallop and no friction rub.    No murmur heard.  Pulmonary/Chest: Effort normal and breath sounds normal. She exhibits no tenderness.   Abdominal: Soft. Bowel sounds are normal. She exhibits no distension and no mass. There is no tenderness.   Musculoskeletal: She exhibits no edema or tenderness.   Neurological: She is alert and oriented to person, place, and time. She has normal strength. No cranial nerve deficit or sensory deficit. Coordination and gait normal.   Skin: Skin is warm and dry. No rash noted.   Psychiatric: She has a normal mood and affect. Her behavior is normal.   Nursing note and vitals reviewed.      ED Course     ED Course     Procedures     10:08 AM  The patient was seen and examined by Dr. Bernabe in Room 19.               EKG Interpretation:      Interpreted by Yosef Bernabe  Time reviewed: 1016  Symptoms at time of EKG: Vertigo   Rhythm: normal sinus   Rate: Normal  Axis: Normal  Ectopy: none  Conduction: QTc 0.466, incomplete right bundle branch block  ST Segments/ T Waves: Non-specific ST-T wave changes  Q Waves: none  Comparison to prior: Nonspecific ST-T changes are slightly different from EKG 06/02/2017, otherwise unremarkable    Clinical Impression: non-specific EKG                Critical Care time:  none               Labs Ordered and Resulted from Time of ED Arrival Up to the Time of Departure from the ED   CBC WITH PLATELETS DIFFERENTIAL - Abnormal; Notable for the following:        Result Value    RBC Count 3.70 (*)     Absolute Lymphocytes 0.7 (*)     All other components within normal limits   COMPREHENSIVE METABOLIC PANEL - Abnormal; Notable for the following:     Glucose 115 (*)     All other components within normal limits   ERYTHROCYTE SEDIMENTATION RATE AUTO - Abnormal; Notable for the following:     Sed Rate 31 (*)     All other  components within normal limits   CRP INFLAMMATION            Assessments & Plan (with Medical Decision Making)   Patient presenting with ongoing episodic positional vertigo.  Today in vestibular rehab there was documented evidence of some vertical component of her nystagmus.  Differential diagnosis includes but not limited to:  Central causes such as vertebro-basilar artery insufficiency, cerebellar hemorrhage or infarct, infection such as meningitis, CNS neoplasm, MS.  Also peripheral causes considered including benign positional vertigo, labyrinthitis, vestibular neuronitis, otitis media, Ménière's disease, traumatic injury to the labyrinthine apparatus.   her vital signs are normal.  Her mental status is normal.  Her neck is supple.  Her neurologic exam is normal.  I appreciate horizontal rotary nystagmus only on my exam, but note the vertical nystagmus which was seen in vestibular rehab clinic.  Due to recurrent and refractory vertigo with this finding on exam further diagnostic workup was entertained.  The patient had an MRI the results of which are essentially normal for age.  Given the absence of any focal neurologic findings on exam and the negative workup today, I find no evidence of any the aforementioned life-threatening processes.  She is able to ambulate without much difficulty and I feel she is stable to proceed for further evaluation and treatment as an outpatient.  I discussed the case with ENT and I will refer to them in follow-up given the severe nature of her symptoms.  She will also continue with vestibular rehab.  Meclizine, Zofran, dimenhydrinate of all been ineffective in treating her symptoms.  We agreed to give a very small number of low-dose lorazepam in hopes that her symptoms could be better controlled at home while she continues with rehab and further workup.  Discussed expected course, need for follow up, and indications for return with the patient.  See discharge instructions.      I  have reviewed the nursing notes.    I have reviewed the findings, diagnosis, plan and need for follow up with the patient.    New Prescriptions    LORAZEPAM (ATIVAN) 0.5 MG TABLET    Take 0.5 tablets (0.25 mg) by mouth every 8 hours as needed for other (vertigo)       Final diagnoses:   Peripheral vertigo, unspecified laterality     IGeraldine, am serving as a trained medical scribe to document services personally performed by Yosef Bernabe MD, based on the provider's statements to me.   IYosef MD, was physically present and have reviewed and verified the accuracy of this note documented by Geraldine Bonilla.      6/27/2017   [facility], [place], EMERGENCY DEPARTMENT     Yosef Bernabe MD  06/27/17 [id number]

## 2017-06-27 NOTE — ED AVS SNAPSHOT
[facility], [place], Emergency Department    [address] [address]    Phone:  [phone]    Fax:  [phone]                                       Talon Myers   MRN: [id number]    Department:  [facility], Emergency Department   Date of Visit:  6/27/2017           After Visit Summary Signature Page     I have received my discharge instructions, and my questions have been answered. I have discussed any challenges I see with this plan with the nurse or doctor.    ..........................................................................................................................................  Patient/Patient Representative Signature      ..........................................................................................................................................  Patient Representative Print Name and Relationship to Patient    ..................................................               ................................................  Date                                            Time    ..........................................................................................................................................  Reviewed by Signature/Title    ...................................................              ..............................................  Date                                                            Time

## 2017-06-28 ENCOUNTER — THERAPY VISIT (OUTPATIENT)
Dept: PHYSICAL THERAPY | Facility: CLINIC | Age: 67
End: 2017-06-28

## 2017-06-28 DIAGNOSIS — R42 VERTIGO: ICD-10-CM

## 2017-06-28 DIAGNOSIS — H81.10 BPPV (BENIGN PAROXYSMAL POSITIONAL VERTIGO), UNSPECIFIED LATERALITY: Primary | ICD-10-CM

## 2017-06-28 NOTE — PROGRESS NOTES
06/27/17 0800   Signing Clinician's Name / Credentials   Signing clinician's name / credentials Megan Valdez DPT NCS   Session Number   Session Number 3   Goal 1   Goal Identifier vertigo   Goal Description eliminate sensation of vertigo when getting in/out of bed, bending over, looking up   Subjective Report   Subjective Report No dizzi on 6/14 and she sat up to sleep after that for days. Wants to go to work. Then saw Norberto and after that got a litle dizzi again but then slept good over wknd. Today woke up so dizzi with vomiting and diarrhea. Took a dramamine today. doesn't think it really has helped and doesn't think the meclazine really helped. Headache today is mild and no ear symptoms.   Objective Measure 1   Objective Measure vitals seated right arm 140/81 and HR 70   Details vitals standing 149/87 and HR 72 BPMs. right arm.   Objective Measure 2   Objective Measure 25fTW 8.97 seconds and 16 steps. She is really focused on a target while walking to prevent weaving.   Details 4 item DGi 3/12  (Significant dizziness with head motion, vertical worse)   Objective Measure 3   Objective Measure She walked to the shuttle bus at [place], took shuttle and then walked up to our clinic from [facility].   Details She reports she was walking very slowly and carefully.   Objective Measure 4   Objective Measure skew deviation test was negative.   Details VAT to right she reprots dizziness but not to the left.   Neuromuscular Re-education   Minutes 25   Skilled Intervention Vitals. Seated head motion proboked her dizziness. VAT test. Spoke to dr Мария Harrell about case Decision to do full HINTS exam. AFter gait tests I called her [facility] clinic and spoke to DARVIN Canales about case and she agreed that I shoudl send her to the ER. Explained ot the pt that we want her to go to the ER to have a head MRI. She is worried about the expense of [facility] ER wants to go to [place]. She made it down to the shuttle bus by herself  (Mike went and checked on her).    Oculomotor Exam   Rapid Head Thrust Normal   Rapid Head Thrust Comments Both sides tested multiple times.   Infrared Goggle Exam or Frenzel Lense Exam   Vestibular Suppressant in Last 24 Hours? Yes  (dramamine)   Exam completed with Infrared Goggles   Spontaneous Nystagmus Negative   Gaze Evoked Nystagmus Other   Gaze Evoked Nystagmus comments with full gaze to the left has soem down beating   Head Shake Horizontal Nystagmus Negative;Other   Head Shake Horizontal Nystagmus comments 2 fair size down beats but it must be over 3 to be abnormal.No significant increase in symptoms (surprisingly).    Positional Testing comments no nystagmus seen in room light   [place]-Hallpike (right) Downbeating  (second time similar results.)   Yue-Hallpike (right) comments mild downbeating (gets worse with gaze to the left) but no spinning. Slows over time in the position. Return to vertical is negative   Yue-Hallpike (Left) Downbeating  (second tiem similar results)   [place]-Hallpike (left) comments mild downbeating (gets worse with gaze to the left) but not spinning sensation.Slows over time in the position. Return to vertical is negative.   [facility] Supine Roll Test (Right) Negative   [facility] Supine Roll Test (Right) Comments negative when supine and head in midline and negative with return to vertical.   [facility] Supine Roll Test (Left) Negative   Gait Training   Minutes 5   Skilled Intervention gait speed and 4 item DGI.    Standard Canalith Repositioning   Minutes 30   Skilled Intervention Lots of testing with IR goggles. Then I went and spoke to DR Мария Harrell and did HINTS exam (as above). Repeated positional testing a third time for DHS. No change. Then did gait testing.    Education   Learner Patient   Readiness Acceptance   Method Explanation   Response Verbalizes Understanding   Communication with other professionals   Communication with other professionals Spoke to Dr Gutierrez in the ER and he had  already planed to order brain MRI. He then sent a message later that the MRI was negative> he gave her valium since other meds did not help.He ordered ENT consult and more PT.   Plan   Homework none   Updates to plan of care sent her to the ER today. Explained we would still see her this later this week.   Plan for next session retest positionals and DVA,    Comments   Comments Gait is signifcantly affected today. Plus she has had vomiting and diarrhea this AM with her dizziness/spinning. Head motion seated  and walking makes her worse. Positional testing with IR goggles has some DBing nystagmus (mild). HINTS exam : negative for head impulse thus less likely its peripheral. Nystagmus that is vertical with left end gaze but not in room light. No skew deviation. Sent ot the ER for head imaging to rule out CVA.    Total Session Time   Total Session Time 65   AMBULATORY CLINICS ONLY-MEDICAL AND TREATMENT DIAGNOSIS   Medical Diagnosis vertigo   PT Diagnosis vertigo, BPPV   Megan Valdez DPT, MPT, NCS

## 2017-06-29 LAB — INTERPRETATION ECG - MUSE: NORMAL

## 2017-07-03 ENCOUNTER — OFFICE VISIT (OUTPATIENT)
Dept: FAMILY MEDICINE | Facility: CLINIC | Age: 67
End: 2017-07-03
Payer: COMMERCIAL

## 2017-07-03 ENCOUNTER — TELEPHONE (OUTPATIENT)
Dept: FAMILY MEDICINE | Facility: CLINIC | Age: 67
End: 2017-07-03

## 2017-07-03 VITALS
OXYGEN SATURATION: 100 % | SYSTOLIC BLOOD PRESSURE: 119 MMHG | WEIGHT: 92.8 LBS | DIASTOLIC BLOOD PRESSURE: 77 MMHG | TEMPERATURE: 97.8 F | HEART RATE: 73 BPM | BODY MASS INDEX: 18.22 KG/M2 | HEIGHT: 60 IN

## 2017-07-03 DIAGNOSIS — H81.399 PERIPHERAL VERTIGO, UNSPECIFIED LATERALITY: ICD-10-CM

## 2017-07-03 DIAGNOSIS — H81.10 BPPV (BENIGN PAROXYSMAL POSITIONAL VERTIGO), UNSPECIFIED LATERALITY: Primary | ICD-10-CM

## 2017-07-03 PROCEDURE — 99213 OFFICE O/P EST LOW 20 MIN: CPT | Performed by: PHYSICIAN ASSISTANT

## 2017-07-03 NOTE — MR AVS SNAPSHOT
After Visit Summary   7/3/2017    Talon SILVA Son    MRN: [id number]           Patient Information     Date Of Birth          1950        Visit Information        Provider Department      7/3/2017 3:40 PM Donna Canales PA-C [facility]        Today's Diagnoses     BPPV (benign paroxysmal positional vertigo), unspecified laterality    -  1    Peripheral vertigo, unspecified laterality          Care Instructions    We will call you on Wednesday with a date and time to see [facility]NT providers  For now no driving            Follow-ups after your visit        Additional Services     OTOLARYNGOLOGY REFERRAL       Your provider has referred you to: N: [facility] [phone] [email or web address]    Please be aware that coverage of these services is subject to the terms and limitations of your health insurance plan.  Call member services at your health plan with any benefit or coverage questions.      Please bring the following with you to your appointment:    (1) Any X-Rays, CTs or MRIs which have been performed.  Contact the facility where they were done to arrange for  prior to your scheduled appointment.   (2) List of current medications  (3) This referral request   (4) Any documents/labs given to you for this referral                  Your next 10 appointments already scheduled     Jul 05, 2017  3:15 PM CDT   Treatment 45 with Norberto Norwood PT   [facility]    [address] [place] [id number] [phone]              Who to contact     If you have questions or need follow up information about today's clinic visit or your schedule please contact [facility] directly at [phone].  Normal or non-critical lab and imaging results will be communicated to you by MyChart, letter or phone within 4 business days after the clinic has received the  results. If you do not hear from us within 7 days, please contact the clinic through [employer] or phone. If you have a critical or abnormal lab result, we will notify you by phone as soon as possible.  Submit refill requests through [employer] or call your pharmacy and they will forward the refill request to us. Please allow 3 business days for your refill to be completed.          Additional Information About Your Visit        [employer] Information     [employer] gives you secure access to your electronic health record. If you see a primary care provider, you can also send messages to your care team and make appointments. If you have questions, please call your primary care clinic.  If you do not have a primary care provider, please call [phone] and they will assist you.        Care EveryWhere ID     This is your Care EveryWhere ID. This could be used by other organizations to access your [facility] medical records  [id number]        Your Vitals Were     Pulse Temperature Height Pulse Oximetry BMI (Body Mass Index)       73 97.8  F (36.6  C) (Oral) 5' (1.524 m) 100% 18.12 kg/m2        Blood Pressure from Last 3 Encounters:   07/03/17 119/77   06/27/17 145/83   06/22/17 124/84    Weight from Last 3 Encounters:   07/03/17 92 lb 12.8 oz (42.1 kg)   06/27/17 93 lb 3.2 oz (42.3 kg)   06/22/17 93 lb 11.2 oz (42.5 kg)              We Performed the Following     OTOLARYNGOLOGY REFERRAL        Primary Care Provider Office Phone # Fax #    Donna Canales PA-C [phone]       [facility] [address] [facility] [address]  [facility] [address]        Equal Access to Services     SAM ZAMORA : Hadii betzy oconnor hademmanuelo Sobradley, waaxda luqadaha, qaybta kaalmada adebryceyada, cristopher keller. So [facility] [phone].    ATENCIÓN: Si habla español, tiene a maxwell disposición servicios gratuitos de asistencia lingüística. Llame al [phone].    We comply with applicable federal civil rights  laws and [place] laws. We do not discriminate on the basis of race, color, national origin, age, disability sex, sexual orientation or gender identity.            Thank you!     Thank you for choosing [facility]  for your [place]. Our goal is always to provide you with excellent care. Hearing back from our patients is one way we can continue to improve our services. Please take a few minutes to complete the written survey that you may receive in the mail after your visit with us. Thank you!             Your Updated Medication List - Protect others around you: Learn how to safely use, store and throw away your medicines at www.disposemymeds.org.          This list is accurate as of: 7/3/17  4:12 PM.  Always use your most recent med list.                   Brand Name Dispense Instructions for use Diagnosis    dimenhyDRINATE 50 MG Chew      Take 50 mg by mouth every 6 hours as needed for sleep        IBUPROFEN PO      Take 200 mg by mouth as needed for moderate pain Reported on 4/10/2017        LORazepam 0.5 MG tablet    ATIVAN    10 tablet    Take 0.5 tablets (0.25 mg) by mouth every 8 hours as needed for other (vertigo)        meclizine 12.5 MG tablet    ANTIVERT    30 tablet    Take 1 tablet (12.5 mg) by mouth 4 times daily as needed for dizziness        Multi-vitamin Tabs tablet      Take 1 tablet by mouth daily        VITAMIN D (CHOLECALCIFEROL) PO      Take 1,000 Units by mouth daily

## 2017-07-03 NOTE — TELEPHONE ENCOUNTER
Can someone call [facility] to get patient in ASAP? She was seen in the ED and it was recommended by on-call ENT that she follow up with them. They are closed July 3rd and 4th for the holidays. If trouble getting in as soon as possible, please call me on my cell. Thanks!

## 2017-07-03 NOTE — PATIENT INSTRUCTIONS
We will call you on Wednesday with a date and time to see [facility] providers  For now no driving

## 2017-07-03 NOTE — PROGRESS NOTES
SUBJECTIVE:                                                    Talon SILVA Son is a 66 year old female who presents to clinic today for the following health issues:      ED/UC Followup:    Facility:  [facility]   Date of visit: 6/27/2017  Reason for visit: vertigo  Current Status: Still having vertigo, have to sleep sitting up if she lay down in sleep she wake up dizzy. ED want pt to follow up with ENT     [facility]  Only gets extremely dizzy with lying down  Otherwise she is fine  Seen in ED, ruled out stroke and dissection.         Problem list and histories reviewed & adjusted, as indicated.  Additional history: as documented    ROS:  C: NEGATIVE for fever, chills, change in weight  I: NEGATIVE for worrisome rashes, moles or lesions  E: NEGATIVE for vision changes or irritation  E/M: NEGATIVE for ear, mouth and throat problems  R: NEGATIVE for significant cough or SOB  B: NEGATIVE for masses, tenderness or discharge  CV: NEGATIVE for chest pain, palpitations or peripheral edema  GI: NEGATIVE for nausea, abdominal pain, heartburn, or change in bowel habits  : NEGATIVE for frequency, dysuria, or hematuria  M: NEGATIVE for significant arthralgias or myalgia  NEURO: NEGATIVE for involuntary movements, memory problems, syncope and visual change   E: NEGATIVE for temperature intolerance, skin/hair changes  H: NEGATIVE for bleeding problems  P: NEGATIVE for changes in mood or affect    Patient Active Problem List   Diagnosis     Ankle pain     Non-toxic multinodular goiter     CARDIOVASCULAR SCREENING; LDL GOAL LESS THAN 160     Advanced directives, counseling/discussion     Osteoporosis     BPPV (benign paroxysmal positional vertigo), unspecified laterality     Past Surgical History:   Procedure Laterality Date     APPENDECTOMY  09/17/12     COLONOSCOPY  10/2007    repeat 10 yrs     COLONOSCOPY  6/2014    repeat 10 yrs       Social History   Substance Use Topics     Smoking  status: Never Smoker     Smokeless tobacco: Never Used     Alcohol use No     Family History   Problem Relation Age of Onset     HEART DISEASE Mother            Labs reviewed in EPIC  BP Readings from Last 3 Encounters:   07/03/17 119/77   06/27/17 145/83   06/22/17 124/84    Wt Readings from Last 3 Encounters:   07/03/17 92 lb 12.8 oz (42.1 kg)   06/27/17 93 lb 3.2 oz (42.3 kg)   06/22/17 93 lb 11.2 oz (42.5 kg)                    OBJECTIVE:                                                    /77  Pulse 73  Temp 97.8  F (36.6  C) (Oral)  Ht 5' (1.524 m)  Wt 92 lb 12.8 oz (42.1 kg)  SpO2 100%  BMI 18.12 kg/m2 Body mass index is 18.12 kg/(m^2).   GENERAL:: healthy, alert and no distress         ASSESSMENT/PLAN:                                                        ICD-10-CM    1. BPPV (benign paroxysmal positional vertigo), unspecified laterality H81.10 OTOLARYNGOLOGY REFERRAL   2. Peripheral vertigo, unspecified laterality H81.399 OTOLARYNGOLOGY REFERRAL     Attempted to call ENT to get her in but they are closed for the holidays. Sent telephone message to nurses to call on Wednesday morning to get her in ASAP. Continue medications as needed for now and no driving. Return to clinic for any new or worsening symptoms or go to ER Urgent care in off hours    > 15 minutes were spent with the patient and / or family present in education and / or counseling regarding the above issues.  This represented more than 50% of the time spent interacting with the patient during this visit.     Patient Instructions   We will call you on Wednesday with a date and time to see [facility] providers  For now no driving          Estimated body mass index is 18.12 kg/(m^2) as calculated from the following:    Height as of this encounter: 5' (1.524 m).    Weight as of this encounter: 92 lb 12.8 oz (42.1 kg).       Donna Larsen [facility]  [facility]

## 2017-07-03 NOTE — NURSING NOTE
Chief Complaint   Patient presents with     ER F/U       Initial /77  Pulse 73  Temp 97.8  F (36.6  C) (Oral)  Ht 5' (1.524 m)  Wt 92 lb 12.8 oz (42.1 kg)  SpO2 100%  BMI 18.12 kg/m2 Estimated body mass index is 18.12 kg/(m^2) as calculated from the following:    Height as of this encounter: 5' (1.524 m).    Weight as of this encounter: 92 lb 12.8 oz (42.1 kg).  Medication Reconciliation: complete   Ema Farrell MA

## 2017-07-05 ENCOUNTER — THERAPY VISIT (OUTPATIENT)
Dept: PHYSICAL THERAPY | Facility: CLINIC | Age: 67
End: 2017-07-05

## 2017-07-05 ENCOUNTER — TELEPHONE (OUTPATIENT)
Dept: FAMILY MEDICINE | Facility: CLINIC | Age: 67
End: 2017-07-05

## 2017-07-05 DIAGNOSIS — R42 VERTIGO: Primary | ICD-10-CM

## 2017-07-05 NOTE — TELEPHONE ENCOUNTER
Called and requested ASAP appt for pt, the soonest they are able to see her is Thursday at 115p. They will call and schedule that appt with the pt. They also requested referral and info be faxed to them at , this was done    [facility] [phone]     Aura Jackson RN

## 2017-07-05 NOTE — TELEPHONE ENCOUNTER
Reason for Call:  Form, our goal is to have forms completed with 72 hours, however, some forms may require a visit or additional information.    Type of letter, form or note:  FMLA    Who is the form from?: [employer] (if other please explain)    Where did the form come from: Patient or family brought in       What clinic location was the form placed at?: [place]    Where the form was placed: Placed In Provider's box    What number is listed as a contact on the form?: [phone]       Additional comments: Please fax form to [phone] and please give patient a call when form has been faxed.     Call taken on 7/5/2017 at 12:16 PM by Joana Lara

## 2017-07-05 NOTE — TELEPHONE ENCOUNTER
Telephone call to [facility] and was on hold for about 10 minutes, another urgent call came in at that time. One nurse in triage currently. Will have to call back later this morning.    From referral:  [facility] [phone]    LAYTON HintonN, RN  [facility]

## 2017-11-09 ENCOUNTER — RADIANT APPOINTMENT (OUTPATIENT)
Dept: MAMMOGRAPHY | Facility: CLINIC | Age: 67
End: 2017-11-09
Attending: PHYSICIAN ASSISTANT

## 2017-11-09 DIAGNOSIS — Z12.31 SCREENING MAMMOGRAM, ENCOUNTER FOR: ICD-10-CM

## 2017-11-20 ENCOUNTER — ALLIED HEALTH/NURSE VISIT (OUTPATIENT)
Dept: NURSING | Facility: CLINIC | Age: 67
End: 2017-11-20
Payer: COMMERCIAL

## 2017-11-20 DIAGNOSIS — Z23 NEED FOR VACCINATION: Primary | ICD-10-CM

## 2017-11-20 PROCEDURE — 90471 IMMUNIZATION ADMIN: CPT

## 2017-11-20 PROCEDURE — 99207 ZZC NO CHARGE LOS: CPT

## 2017-11-20 PROCEDURE — 90670 PCV13 VACCINE IM: CPT

## 2018-01-05 NOTE — PROGRESS NOTES
SUBJECTIVE:   Talon SILVA Son is a 67 year old female who presents to clinic today for the following health issues:    Eye(s) Problem  Onset: Last Monday, Thursday worse    Description:   Location: right  Pain: no   Redness: no   Itching: YES- still    Accompanying Signs & Symptoms:  Discharge/mattering: no  Swelling: last week  Visual changes: no  Fever: no  Nasal Congestion: no  Bothered by bright lights: no    History:   Trauma: no   Foreign body exposure: no    Precipitating factors:   Wearing contacts: no    Alleviating factors:  Improved by: eye drops    Therapies Tried and outcome:     Itching on back of neck as well,     Still with a  Rash on the neck and cortisone is not helping  Admits she's been using a new shampoo to help with hair loss. The itching started after this    Reports she is still sleeping in a reclined position instead of fully supine because she's afraid of the dizziness coming back.   She hasn't been dizzy in several weeks.     Problem list and histories reviewed & adjusted, as indicated.  Additional history: as documented    ROS:  C: NEGATIVE for fever, chills, change in weight  E/M: NEGATIVE for ear, mouth and throat problems  R: NEGATIVE for significant cough or SOB  CV: NEGATIVE for chest pain, palpitations or peripheral edema    Patient Active Problem List   Diagnosis     Ankle pain     Non-toxic multinodular goiter     CARDIOVASCULAR SCREENING; LDL GOAL LESS THAN 160     Advanced directives, counseling/discussion     Osteoporosis     BPPV (benign paroxysmal positional vertigo), unspecified laterality     Past Surgical History:   Procedure Laterality Date     APPENDECTOMY  09/17/12     COLONOSCOPY  10/2007    repeat 10 yrs     COLONOSCOPY  6/2014    repeat 10 yrs       Social History   Substance Use Topics     Smoking status: Never Smoker     Smokeless tobacco: Never Used     Alcohol use No     Family History   Problem Relation Age of Onset     HEART DISEASE Mother            Problem  "list, Medication list, Allergies, and Medical/Social/Surgical histories reviewed in [facility] and updated as appropriate.  Labs reviewed in EPIC    OBJECTIVE:                                                    /62  Pulse 67  Temp 97  F (36.1  C) (Oral)  Wt 90 lb 14.4 oz (41.2 kg)  SpO2 100%  BMI 17.75 kg/m2 Body mass index is 17.75 kg/(m^2).   GEN: Pt in no acute distress.  EYE: right upper lateral lid with small 1 mm pustule and mild surrounding erythema and edema. Sclera clear. EOM's intact. Left eye normal.   HEENT: AT, NC, ears normal, throat normal.  NODES: no anterior cervical LA, no supraclavicular LA  HEART: Regular rate and rhythm without murmurs, rubs or gallops  LUNGS: Clear to auscultation  SKIN:  Macular dry rash of posterior neck about 6 cm x 3 cm. No lymphangitis, fluctuance, induration, bleeding or discharge         ASSESSMENT/PLAN:                                                        ICD-10-CM    1. Hordeolum externum of right upper eyelid H00.011    2. Atopic dermatitis, unspecified type L20.9 triamcinolone (KENALOG) 0.1 % cream   3. BPPV (benign paroxysmal positional vertigo), unspecified laterality H81.10      Warm compress for stye. No need for medicine as this is resolving on its own. See below. Reassured that BPPV has resolved and to try to sleep supine at night to prevent neck pain. Return to clinic for any new or worsening symptoms or go to ER Urgent care in off hours      Patient Instructions   switch to Eucerin cream and use after showering  Avoid all lotions, soaps and detergents with fragrances or dyes. Use \"hypoallergenic\" products  Use warm compress for 20 min to eye daily  Return to clinic for any new or worsening symptoms or go to ER Urgent care in off hours      Atopic Dermatitis (Adult)  Atopic dermatitis is a dry, itchy, red rash. It s also called eczema. The rash is chronic, or ongoing. It can come and go over time. The disease is often passed down in families. It causes " a problem with the skin barrier that makes the skin more sensitive to the environment and other factors. The increased skin sensitivity causes an itch, which causes scratching. Scratching can worsen the itching or also break the skin. This can put the skin at risk of infection.  The condition is most common in people with asthma, hay fever, hives, or dry or sensitive skin. The rash may be caused by extreme heat or heavy sweating. Skin irritants can cause the rash to flare up. These can include wool or silk clothing, grease, oils, some medicines, and harsh soaps and detergents. Emotional stress can also be a trigger.  Treatment is done to relieve the itching and inflammation of the skin.  Home care  Follow these tips to care for your condition:    Keep the areas of rash clean by bathing at least every other day. Use lukewarm water to bathe. Don t use hot water, which can dry out the skin.    Don t use soaps with strong detergents. Use mild soaps made for sensitive skin.    Apply a cream or ointment to damp skin right after bathing.    Avoid things that irritate your skin. Wear absorbent, soft fabrics next to the skin rather than rough or scratchy materials.    Use mild laundry soap free of scents and perfumes. Make sure to rinse all the soap out of your clothes.    Treat any skin infection as directed.    Use oral diphenhydramine to help reduce itching. This is an antihistamine you can buy at drug and grocery stores. It can make you sleepy, so use lower doses during the daytime. Or you can use loratadine. This is an antihistamine that will not make you sleepy. Do not use diphenhydramine if you have glaucoma or have trouble urinating due to an enlarged prostate.  Follow-up care  See your healthcare provider, or as advised. If your symptoms don t get better or if they get worse in the next 7 days, make an appointment with your healthcare provider.  When to seek medical advice  Call your healthcare provider right  away  if any of these occur:    Increasing area of redness or pain in the skin    Yellow crusts or wet drainage from the rash    Fever of 100.4 F (38 C) or higher, or as directed by your healthcare provider  Date Last Reviewed: 9/1/2016 2000-2017 [employer]. [address] [place] [address]. All rights reserved. This information is not intended as a substitute for professional medical care. Always follow your healthcare professional's instructions.        Sty (or Stye)  A sty is an infection of the oil gland of the eyelid. It may develop into a small pocket of pus (an abscess). This can cause pain, redness, and swelling. In early stages, a sty is treated with antibiotic cream, eye drops, or a small towel soaked in warm water (a warm compress). More severe cases may need to be opened and drained by a healthcare provider.  Home care    Eye drops or ointment are usually prescribed to treat the infection. Use these as directed.     Artificial tears may also be used to lubricate the eye and make it more comfortable. You can buy these over the counter without a prescription. Talk with your healthcare provider before using any over-the-counter treatment for a sty.    Apply a warm, damp towel to the affected eye for at least 5 minutes, 3 to 4 times a day for a week. Warm compresses open the pores and speed the healing. But if the compresses are too hot, they may burn your eyelid.    Sometimes the sty will drain with this treatment alone. If this happens, keep using the antibiotic until all the redness and swelling are gone.    Wash your hands before and after touching the infected eyelid to avoid spreading the infection.    Don t squeeze or try to break open the sty.  Follow-up care  Follow up with your healthcare provider, or as advised.   When to seek medical advice  Call your healthcare provider right away if any of these occur:    Increase in swelling or redness around the eyelid after 48 to  72 hours    Increase in eye pain or the eyelid blisters    Increase in warmth--the eyelid feels hot    Drainage of blood or thick pus from the sty    Blister on the eyelid    Inability to open the eyelid due to swelling    Fever of 100.4 F (38 C) or above, or as directed by your provider    Vision changes    Headache or stiff neck    The sty comes back  Date Last Reviewed: 8/1/2017 2000-2017 [employer]. [address] [place] [address], [place] [address]. All rights reserved. This information is not intended as a substitute for professional medical care. Always follow your healthcare professional's instructions.              Estimated body mass index is 17.75 kg/(m^2) as calculated from the following:    Height as of 7/3/17: 5' (1.524 m).    Weight as of this encounter: 90 lb 14.4 oz (41.2 kg).       Donna Dalallydia  [facility]

## 2018-01-08 ENCOUNTER — OFFICE VISIT (OUTPATIENT)
Dept: FAMILY MEDICINE | Facility: CLINIC | Age: 68
End: 2018-01-08
Payer: COMMERCIAL

## 2018-01-08 VITALS
DIASTOLIC BLOOD PRESSURE: 62 MMHG | WEIGHT: 90.9 LBS | BODY MASS INDEX: 17.75 KG/M2 | TEMPERATURE: 97 F | OXYGEN SATURATION: 100 % | SYSTOLIC BLOOD PRESSURE: 112 MMHG | HEART RATE: 67 BPM

## 2018-01-08 DIAGNOSIS — H00.011 HORDEOLUM EXTERNUM OF RIGHT UPPER EYELID: Primary | ICD-10-CM

## 2018-01-08 DIAGNOSIS — H81.10 BPPV (BENIGN PAROXYSMAL POSITIONAL VERTIGO), UNSPECIFIED LATERALITY: ICD-10-CM

## 2018-01-08 DIAGNOSIS — L20.9 ATOPIC DERMATITIS, UNSPECIFIED TYPE: ICD-10-CM

## 2018-01-08 PROCEDURE — 99214 OFFICE O/P EST MOD 30 MIN: CPT | Performed by: PHYSICIAN ASSISTANT

## 2018-01-08 RX ORDER — TRIAMCINOLONE ACETONIDE 1 MG/G
CREAM TOPICAL
Qty: 30 G | Refills: 0 | Status: SHIPPED | OUTPATIENT
Start: 2018-01-08 | End: 2018-10-11

## 2018-01-08 NOTE — PATIENT INSTRUCTIONS
"switch to Eucerin cream and use after showering  Avoid all lotions, soaps and detergents with fragrances or dyes. Use \"hypoallergenic\" products  Use warm compress for 20 min to eye daily  Return to clinic for any new or worsening symptoms or go to ER Urgent care in off hours      Atopic Dermatitis (Adult)  Atopic dermatitis is a dry, itchy, red rash. It s also called eczema. The rash is chronic, or ongoing. It can come and go over time. The disease is often passed down in families. It causes a problem with the skin barrier that makes the skin more sensitive to the environment and other factors. The increased skin sensitivity causes an itch, which causes scratching. Scratching can worsen the itching or also break the skin. This can put the skin at risk of infection.  The condition is most common in people with asthma, hay fever, hives, or dry or sensitive skin. The rash may be caused by extreme heat or heavy sweating. Skin irritants can cause the rash to flare up. These can include wool or silk clothing, grease, oils, some medicines, and harsh soaps and detergents. Emotional stress can also be a trigger.  Treatment is done to relieve the itching and inflammation of the skin.  Home care  Follow these tips to care for your condition:    Keep the areas of rash clean by bathing at least every other day. Use lukewarm water to bathe. Don t use hot water, which can dry out the skin.    Don t use soaps with strong detergents. Use mild soaps made for sensitive skin.    Apply a cream or ointment to damp skin right after bathing.    Avoid things that irritate your skin. Wear absorbent, soft fabrics next to the skin rather than rough or scratchy materials.    Use mild laundry soap free of scents and perfumes. Make sure to rinse all the soap out of your clothes.    Treat any skin infection as directed.    Use oral diphenhydramine to help reduce itching. This is an antihistamine you can buy at drug and grocery stores. It can make " you sleepy, so use lower doses during the daytime. Or you can use loratadine. This is an antihistamine that will not make you sleepy. Do not use diphenhydramine if you have glaucoma or have trouble urinating due to an enlarged prostate.  Follow-up care  See your healthcare provider, or as advised. If your symptoms don t get better or if they get worse in the next 7 days, make an appointment with your healthcare provider.  When to seek medical advice  Call your healthcare provider right away  if any of these occur:    Increasing area of redness or pain in the skin    Yellow crusts or wet drainage from the rash    Fever of 100.4 F (38 C) or higher, or as directed by your healthcare provider  Date Last Reviewed: 9/1/2016 2000-2017 [employer]. [address] [place] [address], [place] [address]. All rights reserved. This information is not intended as a substitute for professional medical care. Always follow your healthcare professional's instructions.        Sty (or Stye)  A sty is an infection of the oil gland of the eyelid. It may develop into a small pocket of pus (an abscess). This can cause pain, redness, and swelling. In early stages, a sty is treated with antibiotic cream, eye drops, or a small towel soaked in warm water (a warm compress). More severe cases may need to be opened and drained by a healthcare provider.  Home care    Eye drops or ointment are usually prescribed to treat the infection. Use these as directed.     Artificial tears may also be used to lubricate the eye and make it more comfortable. You can buy these over the counter without a prescription. Talk with your healthcare provider before using any over-the-counter treatment for a sty.    Apply a warm, damp towel to the affected eye for at least 5 minutes, 3 to 4 times a day for a week. Warm compresses open the pores and speed the healing. But if the compresses are too hot, they may burn your eyelid.    Sometimes the sty will drain with  this treatment alone. If this happens, keep using the antibiotic until all the redness and swelling are gone.    Wash your hands before and after touching the infected eyelid to avoid spreading the infection.    Don t squeeze or try to break open the sty.  Follow-up care  Follow up with your healthcare provider, or as advised.   When to seek medical advice  Call your healthcare provider right away if any of these occur:    Increase in swelling or redness around the eyelid after 48 to 72 hours    Increase in eye pain or the eyelid blisters    Increase in warmth--the eyelid feels hot    Drainage of blood or thick pus from the sty    Blister on the eyelid    Inability to open the eyelid due to swelling    Fever of 100.4 F (38 C) or above, or as directed by your provider    Vision changes    Headache or stiff neck    The sty comes back  Date Last Reviewed: 8/1/2017 2000-2017 [employer]. [address] [place] [address], [place] [address]. All rights reserved. This information is not intended as a substitute for professional medical care. Always follow your healthcare professional's instructions.

## 2018-01-08 NOTE — MR AVS SNAPSHOT
"              After Visit Summary   1/8/2018    Talon SILVA Son    MRN: [id number]           Patient Information     Date Of Birth          1950        Visit Information        Provider Department      1/8/2018 9:00 AM Donna Canales PA-C [facility]        Today's Diagnoses     Hordeolum externum of right upper eyelid    -  1    Atopic dermatitis, unspecified type          Care Instructions    switch to Eucerin cream and use after showering  Avoid all lotions, soaps and detergents with fragrances or dyes. Use \"hypoallergenic\" products  Use warm compress for 20 min to eye daily  Return to clinic for any new or worsening symptoms or go to ER Urgent care in off hours      Atopic Dermatitis (Adult)  Atopic dermatitis is a dry, itchy, red rash. It s also called eczema. The rash is chronic, or ongoing. It can come and go over time. The disease is often passed down in families. It causes a problem with the skin barrier that makes the skin more sensitive to the environment and other factors. The increased skin sensitivity causes an itch, which causes scratching. Scratching can worsen the itching or also break the skin. This can put the skin at risk of infection.  The condition is most common in people with asthma, hay fever, hives, or dry or sensitive skin. The rash may be caused by extreme heat or heavy sweating. Skin irritants can cause the rash to flare up. These can include wool or silk clothing, grease, oils, some medicines, and harsh soaps and detergents. Emotional stress can also be a trigger.  Treatment is done to relieve the itching and inflammation of the skin.  Home care  Follow these tips to care for your condition:    Keep the areas of rash clean by bathing at least every other day. Use lukewarm water to bathe. Don t use hot water, which can dry out the skin.    Don t use soaps with strong detergents. Use mild soaps made for sensitive skin.    Apply a cream or ointment to damp skin " right after bathing.    Avoid things that irritate your skin. Wear absorbent, soft fabrics next to the skin rather than rough or scratchy materials.    Use mild laundry soap free of scents and perfumes. Make sure to rinse all the soap out of your clothes.    Treat any skin infection as directed.    Use oral diphenhydramine to help reduce itching. This is an antihistamine you can buy at drug and grocery stores. It can make you sleepy, so use lower doses during the daytime. Or you can use loratadine. This is an antihistamine that will not make you sleepy. Do not use diphenhydramine if you have glaucoma or have trouble urinating due to an enlarged prostate.  Follow-up care  See your healthcare provider, or as advised. If your symptoms don t get better or if they get worse in the next 7 days, make an appointment with your healthcare provider.  When to seek medical advice  Call your healthcare provider right away  if any of these occur:    Increasing area of redness or pain in the skin    Yellow crusts or wet drainage from the rash    Fever of 100.4 F (38 C) or higher, or as directed by your healthcare provider  Date Last Reviewed: 9/1/2016 2000-2017 [employer]. [address] [place] [address], [place] [address]. All rights reserved. This information is not intended as a substitute for professional medical care. Always follow your healthcare professional's instructions.        Sty (or Stye)  A sty is an infection of the oil gland of the eyelid. It may develop into a small pocket of pus (an abscess). This can cause pain, redness, and swelling. In early stages, a sty is treated with antibiotic cream, eye drops, or a small towel soaked in warm water (a warm compress). More severe cases may need to be opened and drained by a healthcare provider.  Home care    Eye drops or ointment are usually prescribed to treat the infection. Use these as directed.     Artificial tears may also be used to lubricate the eye and make  it more comfortable. You can buy these over the counter without a prescription. Talk with your healthcare provider before using any over-the-counter treatment for a sty.    Apply a warm, damp towel to the affected eye for at least 5 minutes, 3 to 4 times a day for a week. Warm compresses open the pores and speed the healing. But if the compresses are too hot, they may burn your eyelid.    Sometimes the sty will drain with this treatment alone. If this happens, keep using the antibiotic until all the redness and swelling are gone.    Wash your hands before and after touching the infected eyelid to avoid spreading the infection.    Don t squeeze or try to break open the sty.  Follow-up care  Follow up with your healthcare provider, or as advised.   When to seek medical advice  Call your healthcare provider right away if any of these occur:    Increase in swelling or redness around the eyelid after 48 to 72 hours    Increase in eye pain or the eyelid blisters    Increase in warmth--the eyelid feels hot    Drainage of blood or thick pus from the sty    Blister on the eyelid    Inability to open the eyelid due to swelling    Fever of 100.4 F (38 C) or above, or as directed by your provider    Vision changes    Headache or stiff neck    The sty comes back  Date Last Reviewed: 8/1/2017 2000-2017 [employer]. [address] [place] [address]. All rights reserved. This information is not intended as a substitute for professional medical care. Always follow your healthcare professional's instructions.                Follow-ups after your visit        Your next 10 appointments already scheduled     Jan 08, 2018  9:00 AM CST   Office Visit with Donna Canales PA-C   [facility] ([facility])    [address] [place] [id number] [phone]           Bring a current list of meds and any records pertaining to this visit. For  Physicals, please bring immunization records and any forms needing to be filled out. Please arrive 10 minutes early to complete paperwork.              Who to contact     If you have questions or need follow up information about today's clinic visit or your schedule please contact [facility] directly at [phone].  Normal or non-critical lab and imaging results will be communicated to you by MyChart, letter or phone within 4 business days after the clinic has received the results. If you do not hear from us within 7 days, please contact the clinic through [facility] or phone. If you have a critical or abnormal lab result, we will notify you by phone as soon as possible.  Submit refill requests through [employer] or call your pharmacy and they will forward the refill request to us. Please allow 3 business days for your refill to be completed.          Additional Information About Your Visit        MyChart Information     [employer] gives you secure access to your electronic health record. If you see a primary care provider, you can also send messages to your care team and make appointments. If you have questions, please call your primary care clinic.  If you do not have a primary care provider, please call [phone] and they will assist you.        Care EveryWhere ID     This is your Care EveryWhere ID. This could be used by other organizations to access your [facility] medical records  [id number]        Your Vitals Were     Pulse Temperature Pulse Oximetry BMI (Body Mass Index)          67 97  F (36.1  C) (Oral) 100% 17.75 kg/m2         Blood Pressure from Last 3 Encounters:   01/08/18 112/62   07/03/17 119/77   06/27/17 145/83    Weight from Last 3 Encounters:   01/08/18 90 lb 14.4 oz (41.2 kg)   07/03/17 92 lb 12.8 oz (42.1 kg)   06/27/17 93 lb 3.2 oz (42.3 kg)              Today, you had the following     No orders found for display         Today's Medication Changes          These changes are  accurate as of: 1/8/18  8:52 AM.  If you have any questions, ask your nurse or doctor.               Start taking these medicines.        Dose/Directions    triamcinolone 0.1 % cream   Commonly known as:  KENALOG   Used for:  Atopic dermatitis, unspecified type   Started by:  Donna Canales PA-C        Apply sparingly to affected area three times daily for 14 days.   Quantity:  30 g   Refills:  0            Where to get your medicines      These medications were sent to [facility] [place], [place] - [address]  [address], [facility] [address]     Phone:  [phone]     triamcinolone 0.1 % cream                Primary Care Provider Office Phone # Fax #    Donna Canales PA-C [phone]       [address]  [facility] [address]        Equal Access to Services     [facility]: Hadii aad ku hadasho Soomaali, waaxda luqadaha, qaybta kaalmada adeegyada, waxay idiin hayaan alexia lieberman . So [facility] [phone].    ATENCIÓN: Si habla español, tiene a maxwell disposición servicios gratuitos de asistencia lingüística. Kellee al [phone].    We comply with applicable federal civil rights laws and [place] laws. We do not discriminate on the basis of race, color, national origin, age, disability, sex, sexual orientation, or gender identity.            Thank you!     Thank you for choosing [facility]  for your care. Our goal is always to provide you with excellent care. Hearing back from our patients is one way we can continue to improve our services. Please take a few minutes to complete the written survey that you may receive in the mail after your visit with us. Thank you!             Your Updated Medication List - Protect others around you: Learn how to safely use, store and throw away your medicines at www.disposemymeds.org.          This list is accurate as of: 1/8/18  8:52 AM.  Always use your most recent med  list.                   Brand Name Dispense Instructions for use Diagnosis    dimenhyDRINATE 50 MG Chew      Take 50 mg by mouth every 6 hours as needed for sleep        IBUPROFEN PO      Take 200 mg by mouth as needed for moderate pain Reported on 4/10/2017        LORazepam 0.5 MG tablet    ATIVAN    10 tablet    Take 0.5 tablets (0.25 mg) by mouth every 8 hours as needed for other (vertigo)        meclizine 12.5 MG tablet    ANTIVERT    30 tablet    Take 1 tablet (12.5 mg) by mouth 4 times daily as needed for dizziness        Multi-vitamin Tabs tablet      Take 1 tablet by mouth daily        triamcinolone 0.1 % cream    KENALOG    30 g    Apply sparingly to affected area three times daily for 14 days.    Atopic dermatitis, unspecified type       VITAMIN D (CHOLECALCIFEROL) PO      Take 1,000 Units by mouth daily

## 2018-04-09 ASSESSMENT — ENCOUNTER SYMPTOMS
STIFFNESS: 0
MYALGIAS: 1
ARTHRALGIAS: 0
NECK PAIN: 1
MUSCLE CRAMPS: 1
MUSCLE WEAKNESS: 0
JOINT SWELLING: 0
BACK PAIN: 0

## 2018-04-18 ENCOUNTER — RADIANT APPOINTMENT (OUTPATIENT)
Dept: ULTRASOUND IMAGING | Facility: CLINIC | Age: 68
End: 2018-04-18
Attending: INTERNAL MEDICINE
Payer: COMMERCIAL

## 2018-04-18 ENCOUNTER — OFFICE VISIT (OUTPATIENT)
Dept: ENDOCRINOLOGY | Facility: CLINIC | Age: 68
End: 2018-04-18
Payer: COMMERCIAL

## 2018-04-18 VITALS
DIASTOLIC BLOOD PRESSURE: 78 MMHG | BODY MASS INDEX: 18.24 KG/M2 | SYSTOLIC BLOOD PRESSURE: 123 MMHG | WEIGHT: 92.9 LBS | HEIGHT: 60 IN | HEART RATE: 77 BPM

## 2018-04-18 DIAGNOSIS — M81.0 SENILE OSTEOPOROSIS: ICD-10-CM

## 2018-04-18 DIAGNOSIS — E04.1 THYROID NODULE: Primary | ICD-10-CM

## 2018-04-18 DIAGNOSIS — E04.1 THYROID NODULE: ICD-10-CM

## 2018-04-18 LAB
ANION GAP SERPL CALCULATED.3IONS-SCNC: 6 MMOL/L (ref 3–14)
BUN SERPL-MCNC: 24 MG/DL (ref 7–30)
CALCIUM SERPL-MCNC: 9.3 MG/DL (ref 8.5–10.1)
CHLORIDE SERPL-SCNC: 108 MMOL/L (ref 94–109)
CO2 SERPL-SCNC: 26 MMOL/L (ref 20–32)
CREAT SERPL-MCNC: 0.83 MG/DL (ref 0.52–1.04)
DEPRECATED CALCIDIOL+CALCIFEROL SERPL-MC: 52 UG/L (ref 20–75)
GFR SERPL CREATININE-BSD FRML MDRD: 69 ML/MIN/1.7M2
GLUCOSE SERPL-MCNC: 84 MG/DL (ref 70–99)
POTASSIUM SERPL-SCNC: 3.8 MMOL/L (ref 3.4–5.3)
SODIUM SERPL-SCNC: 140 MMOL/L (ref 133–144)

## 2018-04-18 RX ORDER — ZOLEDRONIC ACID 5 MG/100ML
5 INJECTION, SOLUTION INTRAVENOUS ONCE
Status: CANCELLED
Start: 2018-04-18 | End: 2018-04-18

## 2018-04-18 ASSESSMENT — PAIN SCALES - GENERAL: PAINLEVEL: NO PAIN (0)

## 2018-04-18 NOTE — LETTER
4/18/2018       RE: Talon SILVA Son  [address]   [facility] [address]     Dear Colleague,    Thank you for referring your patient, Talon SILVA Son, to the [facility] ENDOCRINOLOGY at [facility]. Please see a copy of my visit note below.    Endocrinology Clinic  Date 4/18/2018  Patient: Talon Myers     Reason for visit/consult: f/u osteoporosis, thyroid nodules.     Primary care provider: Donna Canales    HPI:   68yo female with PMH for osteoporosis (diagnosed with dexa 2017) on calcium/vitamin D, history of multinodular goiter (negative biopsies 2012, 2014) who presents today for follow up.    Talon notes she has been doing very well over the past year. She continues to take calcium and vitamin D daily (petite citracals) - 2 pills in the morning, which she tolerates well. She reports stable appetite, weight. She continues to be active with walking daily. 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 She has had no falls or fractures this year. Regarding her thyroid, she hasn't noticed any changes in her neck. She denies any difficulty swallowing or breathing. Recently she has been spending time with her sister who had a lung cancer resection last week.     Remainder ROS negative for headaches, vision changes, hearing changes, dry mouth/eyes, nausea, vomiting, chest pain, abdominal pain, diarrhea, constipation, LE swelling, dysuria, polyuria, hematuria, hematochezia.       Past Medical/Surgical History:  Past Medical History:   Diagnosis Date     Thyroid disease      Vertigo      Past Surgical History:   Procedure Laterality Date     APPENDECTOMY  09/17/12     COLONOSCOPY  10/2007    repeat 10 yrs     COLONOSCOPY  6/2014    repeat 10 yrs       Allergies:  Allergies   Allergen Reactions     Acetaminophen      tylenol     Aspirin [Dihydroxyaluminum Aminoacetate]        Current Medications   Current Outpatient Prescriptions    Medication     IBUPROFEN PO     multivitamin, therapeutic with minerals (MULTI-VITAMIN) TABS     triamcinolone (KENALOG) 0.1 % cream     VITAMIN D, CHOLECALCIFEROL, PO     dimenhyDRINATE 50 MG CHEW     LORazepam (ATIVAN) 0.5 MG tablet     meclizine (ANTIVERT) 12.5 MG tablet     No current facility-administered medications for this visit.        Family History:  Family History   Problem Relation Age of Onset     HEART DISEASE Mother        Social History:  Social History   Substance Use Topics     Smoking status: Never Smoker     Smokeless tobacco: Never Used     Alcohol use No       ROS:  Answers for HPI/ROS submitted by the patient on 4/9/2018   General Symptoms: No  Skin Symptoms: No  HENT Symptoms: No  EYE SYMPTOMS: No  HEART SYMPTOMS: No  LUNG SYMPTOMS: No  INTESTINAL SYMPTOMS: No  URINARY SYMPTOMS: No  GYNECOLOGIC SYMPTOMS: No  BREAST SYMPTOMS: No  SKELETAL SYMPTOMS: Yes  BLOOD SYMPTOMS: No  NERVOUS SYSTEM SYMPTOMS: No  MENTAL HEALTH SYMPTOMS: No  Back pain: No  Muscle aches: Yes  Neck pain: Yes  Swollen joints: No  Joint pain: No  Bone pain: No  Muscle cramps: Yes  Muscle weakness: No  Joint stiffness: No  Bone fracture: No      Physical Exam:   Blood pressure 123/78, pulse 77, height 1.524 m (5'), weight 42.1 kg (92 lb 14.4 oz), not currently breastfeeding.  General: appears well. Sitting comfortably, NAD   Mental Status/neuro: alert and oriented. Speech fluent and articulate. Gait normal.   HEENT: NCAT. EOMI. Anicteric sclera, clear conjunctiva. Thyroid: nontender, but palpable nodule on left side. No lymphadenopathy noted.   Heart: regular rhythm, S1S2, no murmur appreciated  Back: no kyphosis. No tenderness to palpation along any aspect. No step offs or bruising.   Lung: Normal effort on RA. clear to auscultation bilaterally  Abdomen: soft, NT/ND.   Legs: no swelling or edema    Labs:  Last CMP  6/2017   Na 142    Cl 109     BUN 27  K 4.0     CO2 24    Cr 0.76    Calcium 9.6  TBili 0.4  Albumin  4.0  Protein 8.1  Alk Phos 47  ALT 24   AST 23     Bone density test 4/13/2017: I  Dual energy x-ray absorptiometry results:      Region BMD T - score Z - score   L1-L3 0.746 g/cm  -3.5 -1.2             Neck Left 0.688 g/cm  -2.5 -0.6   Total Left 0.769 g/cm  -1.9 -0.2             Neck Right 0.729 g/cm  -2.2 -0.3   Total Right 0.777 g/cm  -1.8 -0.1       Assessment and Plan  66 year old female with osteoporosis and multinodular goiter     #Osteoporosis   Diagnosed 4/2017 with DEXA scan noted above. Has been taking citracal petites (2 pills in the morning) for the past year. Was recommended to start IV reclast last year, but she seems somewhat confused about this today. Discussed with her again today and she is open to trying this.  - BMP (check creatinine) and vitamin D levels today   - Reclast ordered - will follow up for scheduling once approved by insurance  - increase dose of Citracal - to two pills BID (then she will be getting 1000U of Vitamin D daily and 800mg of calcium daily)  - repeat DEXA scan in one year     #Multinodular Goiter  Biopsies in 2012 and 2014 (both with nodules from right and left side sampled) - all negative. Patient denies any significant changes in her neck over the past year, but palpable nodule on exam. Thyroid function normal last year, and no symptoms to suggest changes.   - repeat thyroid US this year - if stable, likely can decrease frequency of US    RTC: 1 year     Patient seen and discussed with Dr. Hodgson.     Ricarda Garcia MD  Internal Medicine PGY2     --- Addendum----  I saw the patient with resident Dr. Garcia and directly examined patient and discussed. Agree above note and plan.     Reclast- she did not go last year, emphasized to go treatment this year.  Calcium citrate she has been taking only half dose last year  MNGs, reviewed images and stable condition, one more time sonogram and if no change, then less frequent monitor.     We spent 35 minutes with this patient face to  face and explained the conditions and plans (more than 50% of time was counseling/coordination of care, went over detailed plan and timing of the test and treatment of osteoporosis and thyroid nodules) . The patient understood and is satisfied with today's visit. Return to clinic with me in 1 year.     Linda Hodgson MD  Staff Physician  Endocrinology and Metabolism  [facility]  License: [place] [address]  Pager: [phone]

## 2018-04-18 NOTE — MR AVS SNAPSHOT
After Visit Summary   4/18/2018    Talon SILVA Son    MRN: [id number]           Patient Information     Date Of Birth          1950        Visit Information        Provider Department      4/18/2018 9:00 AM Linda Hodgson MD [facility]        Today's Diagnoses     Thyroid nodule    -  1    Senile osteoporosis           Follow-ups after your visit        Follow-up notes from your care team     Return in about 1 year (around 4/18/2019).      Who to contact     Please call your clinic at [phone] to:    Ask questions about your health    Make or cancel appointments    Discuss your medicines    Learn about your test results    Speak to your doctor            Additional Information About Your Visit        [employer] Information     [employer] gives you secure access to your electronic health record. If you see a primary care provider, you can also send messages to your care team and make appointments. If you have questions, please call your primary care clinic.  If you do not have a primary care provider, please call [phone] and they will assist you.      CORP80 is an electronic gateway that provides easy, online access to your medical records. With [employer], you can request a clinic appointment, read your test results, renew a prescription or communicate with your care team.     To access your existing account, please contact your [facility] or call [phone] for assistance.        Care EveryWhere ID     This is your Care EveryWhere ID. This could be used by other organizations to access your [facility] medical records  [id number]        Your Vitals Were     Pulse Height BMI (Body Mass Index)             77 1.524 m (5') 18.14 kg/m2          Blood Pressure from Last 3 Encounters:   04/18/18 123/78   01/08/18 112/62   07/03/17 119/77    Weight from Last 3 Encounters:   04/18/18 42.1 kg (92 lb 14.4 oz)   01/08/18 41.2 kg (90 lb 14.4 oz)   07/03/17 42.1 kg (92  lb 12.8 oz)               Primary Care Provider Office Phone # Fax #    Donna Suzie Canales PA-C [phone]       [address] [address]        Equal Access to Services     SAM ZAMORA : Hadii aad ku hademmanuelo Soomaali, waaxda luqadaha, qaybta kaalmada adeegyada, waxabbi hsu haynarcison adebryce johnston laVazquezjudith . So [facility] [phone].    ATENCIÓN: Si habla español, tiene a maxwell disposición servicios gratuitos de asistencia lingüística. Llame al [phone].    We comply with applicable federal civil rights laws and [place] laws. We do not discriminate on the basis of race, color, national origin, age, disability, sex, sexual orientation, or gender identity.            Thank you!     Thank you for choosing [facility]  for your care. Our goal is always to provide you with excellent care. Hearing back from our patients is one way we can continue to improve our services. Please take a few minutes to complete the written survey that you may receive in the mail after your visit with us. Thank you!             Your Updated Medication List - Protect others around you: Learn how to safely use, store and throw away your medicines at www.disposemymeds.org.          This list is accurate as of 4/18/18 12:17 PM.  Always use your most recent med list.                   Brand Name Dispense Instructions for use Diagnosis    dimenhyDRINATE 50 MG Chew      Take 50 mg by mouth every 6 hours as needed for sleep        IBUPROFEN PO      Take 200 mg by mouth as needed for moderate pain Reported on 4/10/2017        LORazepam 0.5 MG tablet    ATIVAN    10 tablet    Take 0.5 tablets (0.25 mg) by mouth every 8 hours as needed for other (vertigo)        meclizine 12.5 MG tablet    ANTIVERT    30 tablet    Take 1 tablet (12.5 mg) by mouth 4 times daily as needed for dizziness        Multi-vitamin Tabs tablet      Take 1 tablet by mouth daily        triamcinolone 0.1 % cream    KENALOG    30 g     Apply sparingly to affected area three times daily for 14 days.    Atopic dermatitis, unspecified type       VITAMIN D (CHOLECALCIFEROL) PO      Take 1,000 Units by mouth daily

## 2018-04-18 NOTE — PROGRESS NOTES
Endocrinology Clinic  Date 4/18/2018  Patient: Talon Myers     Reason for visit/consult: f/u osteoporosis, thyroid nodules.     Primary care provider: Donna Canales    HPI:   66yo female with PMH for osteoporosis (diagnosed with dexa 2017) on calcium/vitamin D, history of multinodular goiter (negative biopsies 2012, 2014) who presents today for follow up.    Talon notes she has been doing very well over the past year. She continues to take calcium and vitamin D daily (petite citracals) - 2 pills in the morning, which she tolerates well. She reports stable appetite, weight. She continues to be active with walking daily. 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 She has had no falls or fractures this year. Regarding her thyroid, she hasn't noticed any changes in her neck. She denies any difficulty swallowing or breathing. Recently she has been spending time with her sister who had a lung cancer resection last week.     Remainder ROS negative for headaches, vision changes, hearing changes, dry mouth/eyes, nausea, vomiting, chest pain, abdominal pain, diarrhea, constipation, LE swelling, dysuria, polyuria, hematuria, hematochezia.       Past Medical/Surgical History:  Past Medical History:   Diagnosis Date     Thyroid disease      Vertigo      Past Surgical History:   Procedure Laterality Date     APPENDECTOMY  09/17/12     COLONOSCOPY  10/2007    repeat 10 yrs     COLONOSCOPY  6/2014    repeat 10 yrs       Allergies:  Allergies   Allergen Reactions     Acetaminophen      tylenol     Aspirin [Dihydroxyaluminum Aminoacetate]        Current Medications   Current Outpatient Prescriptions   Medication     IBUPROFEN PO     multivitamin, therapeutic with minerals (MULTI-VITAMIN) TABS     triamcinolone (KENALOG) 0.1 % cream     VITAMIN D, CHOLECALCIFEROL, PO     dimenhyDRINATE 50 MG CHEW     LORazepam (ATIVAN) 0.5 MG tablet     meclizine (ANTIVERT) 12.5 MG tablet     No  current facility-administered medications for this visit.        Family History:  Family History   Problem Relation Age of Onset     HEART DISEASE Mother        Social History:  Social History   Substance Use Topics     Smoking status: Never Smoker     Smokeless tobacco: Never Used     Alcohol use No       ROS:  Answers for HPI/ROS submitted by the patient on 4/9/2018   General Symptoms: No  Skin Symptoms: No  HENT Symptoms: No  EYE SYMPTOMS: No  HEART SYMPTOMS: No  LUNG SYMPTOMS: No  INTESTINAL SYMPTOMS: No  URINARY SYMPTOMS: No  GYNECOLOGIC SYMPTOMS: No  BREAST SYMPTOMS: No  SKELETAL SYMPTOMS: Yes  BLOOD SYMPTOMS: No  NERVOUS SYSTEM SYMPTOMS: No  MENTAL HEALTH SYMPTOMS: No  Back pain: No  Muscle aches: Yes  Neck pain: Yes  Swollen joints: No  Joint pain: No  Bone pain: No  Muscle cramps: Yes  Muscle weakness: No  Joint stiffness: No  Bone fracture: No      Physical Exam:   Blood pressure 123/78, pulse 77, height 1.524 m (5'), weight 42.1 kg (92 lb 14.4 oz), not currently breastfeeding.  General: appears well. Sitting comfortably, NAD   Mental Status/neuro: alert and oriented. Speech fluent and articulate. Gait normal.   HEENT: NCAT. EOMI. Anicteric sclera, clear conjunctiva. Thyroid: nontender, but palpable nodule on left side. No lymphadenopathy noted.   Heart: regular rhythm, S1S2, no murmur appreciated  Back: no kyphosis. No tenderness to palpation along any aspect. No step offs or bruising.   Lung: Normal effort on RA. clear to auscultation bilaterally  Abdomen: soft, NT/ND.   Legs: no swelling or edema    Labs:  Last CMP  6/2017   Na 142    Cl 109     BUN 27  K 4.0     CO2 24    Cr 0.76    Calcium 9.6  TBili 0.4  Albumin 4.0  Protein 8.1  Alk Phos 47  ALT 24   AST 23     Bone density test 4/13/2017: I  Dual energy x-ray absorptiometry results:      Region BMD T - score Z - score   L1-L3 0.746 g/cm  -3.5 -1.2             Neck Left 0.688 g/cm  -2.5 -0.6   Total Left 0.769 g/cm  -1.9 -0.2             Neck Right  0.729 g/cm  -2.2 -0.3   Total Right 0.777 g/cm  -1.8 -0.1       Assessment and Plan  66 year old female with osteoporosis and multinodular goiter     #Osteoporosis   Diagnosed 4/2017 with DEXA scan noted above. Has been taking citracal petites (2 pills in the morning) for the past year. Was recommended to start IV reclast last year, but she seems somewhat confused about this today. Discussed with her again today and she is open to trying this.  - BMP (check creatinine) and vitamin D levels today   - Reclast ordered - will follow up for scheduling once approved by insurance  - increase dose of Citracal - to two pills BID (then she will be getting 1000U of Vitamin D daily and 800mg of calcium daily)  - repeat DEXA scan in one year     #Multinodular Goiter  Biopsies in 2012 and 2014 (both with nodules from right and left side sampled) - all negative. Patient denies any significant changes in her neck over the past year, but palpable nodule on exam. Thyroid function normal last year, and no symptoms to suggest changes.   - repeat thyroid US this year - if stable, likely can decrease frequency of US    RTC: 1 year     Patient seen and discussed with Dr. Hodgson.     Ricarda Garcia MD  Internal Medicine PGY2     --- Addendum----  I saw the patient with resident Dr. Garcia and directly examined patient and discussed. Agree above note and plan.     Reclast- she did not go last year, emphasized to go treatment this year.  Calcium citrate she has been taking only half dose last year  MNGs, reviewed images and stable condition, one more time sonogram and if no change, then less frequent monitor.     We spent 35 minutes with this patient face to face and explained the conditions and plans (more than 50% of time was counseling/coordination of care, went over detailed plan and timing of the test and treatment of osteoporosis and thyroid nodules) . The patient understood and is satisfied with today's visit. Return to clinic with me in  1 year.     Linda Hodgson MD  Staff Physician  Endocrinology and Metabolism  [facility]  License: [place] [id number]  Pager: [phone]

## 2018-04-26 ENCOUNTER — TELEPHONE (OUTPATIENT)
Dept: ENDOCRINOLOGY | Facility: CLINIC | Age: 68
End: 2018-04-26

## 2018-04-26 NOTE — TELEPHONE ENCOUNTER
----- Message from Alexey Mary RN sent at 4/20/2018  9:41 AM CDT -----  Regarding: FW: Reclast  Is PA is approved please message coordinators to schedule, thanks.   ----- Message -----     From: Alexey Mary RN     Sent: 4/20/2018       To: Alexey Mary RN  Subject: FW: Reclast                                      waiting MD to enter new orders and PA approval.   ----- Message -----     From: Linda Hodgson MD     Sent: 4/18/2018  10:05 AM       To: [facility]  Subject: Reclast                                          Hi    Could you assist the patient for Reclast?    Linda

## 2018-05-04 ENCOUNTER — TELEPHONE (OUTPATIENT)
Dept: ENDOCRINOLOGY | Facility: CLINIC | Age: 68
End: 2018-05-04

## 2018-05-04 NOTE — TELEPHONE ENCOUNTER
----- Message from Elza Barton sent at 5/4/2018  4:22 PM CDT -----  Regarding: RE: Reclast PA Denial  The  Prior auth was denied based on the following:  Pt has not tried and failed one self-administered medication as evidence by one of the following: a decline in bone mineral density (BMD) of equal to or greater than 5%; or experienced a fragility (low-trauma) fracture; OR has severe renal impairment/chronic kidney disease (CKD)  ----- Message -----     From: Alexey Mary RN     Sent: 5/1/2018   2:30 PM       To: Linda Scott MD  Subject: FW: Reclast PA                                   PA team just needs to know if  Patient has tried these meds before    Alendronate   Fosamax   Actonel   Forteo   Boniva     If patient hasn't tried any aforementioned drugs PA maybe denied.     ----- Message -----     From: Alexey Mary RN     Sent: 5/1/2018   1:50 PM       To: Elza Barton  Subject: FW: Prince an please see Dr Hodgson response below and advise thanks  ----- Message -----     From: Linda Hodgson MD     Sent: 4/30/2018   7:20 PM       To: Alexey Mary RN  Subject: RE: Reclast PA                                   Why not Reclast? Her osteoporosis is T -3.5 which is severe osteoporosis, Reclast works better then Fosamax or Actonel.     Linda  ----- Message -----     From: Alexey Mary RN     Sent: 4/30/2018  10:30 AM       To: Linda Hodgson MD  Subject: FW: Reclast PA                                       ----- Message -----     From: Elza Barton     Sent: 4/30/2018  10:06 AM       To: Alexey Mary RN  Subject: Prince DICKERSON                                       This is the pt that I sent an e-mail about. Insurance is asking about previous therapies.     Insurance is asking for additional info to complete this PA for Reclast  Ph. [phone]  History of drugs   Indication   Fax. [phone]  Case [id number]    - PA is pending. We need to know what other drugs pt has  been on for Osteoporosis. Can you look to see what she has been on?    Ex.     Alendronate  Fosamax  Actonel    Carley Tierney

## 2018-05-09 ENCOUNTER — TELEPHONE (OUTPATIENT)
Dept: ENDOCRINOLOGY | Facility: CLINIC | Age: 68
End: 2018-05-09

## 2018-05-09 DIAGNOSIS — M81.0 SENILE OSTEOPOROSIS: Primary | ICD-10-CM

## 2018-05-09 RX ORDER — ALENDRONATE SODIUM 70 MG/1
70 TABLET ORAL
Qty: 12 TABLET | Refills: 3 | Status: SHIPPED | OUTPATIENT
Start: 2018-05-09 | End: 2023-06-23

## 2018-05-09 NOTE — TELEPHONE ENCOUNTER
Left voicemail for patient requesting return call, informed insurance did not cover Reclast, Dr Hodgson prescribed Fosamax Take 1 tablet (70 mg) by mouth every 7 days Take 60 minutes before am meal with 8 oz. water. Remain upright for 30 minutes.

## 2018-05-09 NOTE — PROGRESS NOTES
Insurance denied Reclast, we will try Fosamax.    Linda Hodgson MD  Staff Physician  Endocrinology and Metabolism  [facility]  License: [place] [id number]  Pager: [phone]

## 2018-05-28 ENCOUNTER — DOCUMENTATION ONLY (OUTPATIENT)
Dept: ENDOCRINOLOGY | Facility: CLINIC | Age: 68
End: 2018-05-28

## 2018-05-29 NOTE — PROGRESS NOTES
Hi    I hope you are doing well.   Regarding,     1. Osteoporosis - are you taking Fosamax and calcium tablet? I want to make sure.    2. Thyroid - ultrasound no change, next thyroid ultrasound in 2 year.     Please take care    Linda Hodgson MD

## 2018-10-09 NOTE — PATIENT INSTRUCTIONS
Calcium chew (Viactic)  Vitamin K2 daily  Vitamin D3 2,000 international units  Daily  Return to clinic for any new or worsening symptoms or go to ER Urgent care in off hours    Preventive Health Recommendations  Female Ages 65 +    Yearly exam:     See your health care provider every year in order to  o Review health changes.   o Discuss preventive care.    o Review your medicines if your doctor has prescribed any.      You no longer need a yearly Pap test unless you've had an abnormal Pap test in the past 10 years. If you have vaginal symptoms, such as bleeding or discharge, be sure to talk with your provider about a Pap test.      Every 1 to 2 years, have a mammogram.  If you are over 69, talk with your health care provider about whether or not you want to continue having screening mammograms.      Every 10 years, have a colonoscopy. Or, have a yearly FIT test (stool test). These exams will check for colon cancer.       Have a cholesterol test every 5 years, or more often if your doctor advises it.       Have a diabetes test (fasting glucose) every three years. If you are at risk for diabetes, you should have this test more often.       At age 65, have a bone density scan (DEXA) to check for osteoporosis (brittle bone disease).    Shots:    Get a flu shot each year.    Get a tetanus shot every 10 years.    Talk to your doctor about your pneumonia vaccines. There are now two you should receive - Pneumovax (PPSV 23) and Prevnar (PCV 13).    Talk to your pharmacist about the shingles vaccine.    Talk to your doctor about the hepatitis B vaccine.    Nutrition:     Eat at least 5 servings of fruits and vegetables each day.      Eat whole-grain bread, whole-wheat pasta and brown rice instead of white grains and rice.      Get adequate about Calcium and Vitamin D.     Lifestyle    Exercise at least 150 minutes a week (30 minutes a day, 5 days a week). This will help you control your weight and prevent  disease.      Limit alcohol to one drink per day.      No smoking.       Wear sunscreen to prevent skin cancer.       See your dentist twice a year for an exam and cleaning.      See your eye doctor every 1 to 2 years to screen for conditions such as glaucoma, macular degeneration, cataracts, etc

## 2018-10-09 NOTE — PROGRESS NOTES
SUBJECTIVE:   Talon SILVA Son is a 67 year old female who presents for Preventive Visit.    Healthy Habits:    Do you get at least three servings of calcium containing foods daily (dairy, green leafy vegetables, etc.)? yes    Amount of exercise or daily activities, outside of work: Walks a lot at work    Problems taking medications regularly No    Medication side effects: No    Have you had an eye exam in the past two years? No- but wants to know where she can go for this. Went to [facility] before    Do you see a dentist twice per year? No- has dentures    Do you have sleep apnea, excessive snoring or daytime drowsiness?no      Ability to successfully perform activities of daily living: Yes, no assistance needed    Home safety:  none identified     Hearing impairment: No    Fall risk:  Fallen 2 or more times in the past year?: No  Any fall with injury in the past year?: No    Dizziness is much better now, she sleeps with higher pillows    COGNITIVE SCREEN  1) Repeat 3 items (Leader, Season, Table)    2) Clock draw: NORMAL  3) 3 item recall: Recalls 3 objects  Results: 3 items recalled: COGNITIVE IMPAIRMENT LESS LIKELY    Mini-CogTM Copyright S Krystina. Licensed by the author for use in [facility]; reprinted with permission (soob@[email or web address]). All rights reserved.        Reviewed and updated as needed this visit by clinical staff  Tobacco  Allergies  Meds         Reviewed and updated as needed this visit by Provider        Social History   Substance Use Topics     Smoking status: Never Smoker     Smokeless tobacco: Never Used     Alcohol use No       If you drink alcohol do you typically have >3 drinks per day or >7 drinks per week? No                        Today's PHQ-2 Score:   PHQ-2 ( 1999 Pfizer) 10/11/2018 10/27/2016   Q1: Little interest or pleasure in doing things 0 0   Q2: Feeling down, depressed or hopeless 0 0   PHQ-2 Score 0 0       Do you feel safe in your environment - Yes    Do you have a  Health Care Directive?: No: Advance care planning reviewed with patient; information given to patient to review.    Current providers sharing in care for this patient include:   Patient Care Team:  Donna Canales PA-C as PCP - General (Physician Assistant)    The following health maintenance items are reviewed in Epic and correct as of today:  Health Maintenance   Topic Date Due     HIV SCREEN (SYSTEM ASSIGNED)  11[birth year]01/1968     ADVANCE DIRECTIVE PLANNING Q5 YRS  08/20/2017     PHQ-2 Q1 YR  10/27/2017     FALL RISK ASSESSMENT  11/01/2017     INFLUENZA VACCINE (1) 09/01/2018     PNEUMOCOCCAL (2 of 2 - PPSV23) 11/20/2018     MAMMO SCREEN Q2 YR (SYSTEM ASSIGNED)  11/09/2019     PAP Q5 YEARS  10/27/2021     HPV Q5 YEARS (Complete with PAP)  10/27/2021     LIPID SCREEN Q5 YR FEMALE (SYSTEM ASSIGNED)  10/27/2021     TETANUS IMMUNIZATION (SYSTEM ASSIGNED)  08/20/2022     COLON CANCER SCREEN (SYSTEM ASSIGNED)  06/03/2024     DEXA SCAN SCREENING (SYSTEM ASSIGNED)  Completed     HEPATITIS C SCREENING  Completed     Labs reviewed in EPIC  BP Readings from Last 3 Encounters:   10/11/18 118/72   04/18/18 123/78   01/08/18 112/62    Wt Readings from Last 3 Encounters:   10/11/18 90 lb 9.6 oz (41.1 kg)   04/18/18 92 lb 14.4 oz (42.1 kg)   01/08/18 90 lb 14.4 oz (41.2 kg)                    Pneumonia Vaccine:Adults age 65+ who have not received previous Pneumovax (PPSV23) or PCV13 as an adult: Should first be given PCV13 AND then should be given PPSV23 6-12 months after PCV13  Mammogram Screening: Patient over age 50, mutual decision to screen reflected in health maintenance.    ROS:  CONSTITUTIONAL: NEGATIVE for fever, chills, change in weight  INTEGUMENTARY/SKIN: NEGATIVE for worrisome rashes, moles or lesions  EYES: NEGATIVE for vision changes or irritation  ENT/MOUTH: NEGATIVE for ear, mouth and throat problems  RESP: NEGATIVE for significant cough or SOB  BREAST: NEGATIVE for masses, tenderness or discharge  CV:  NEGATIVE for chest pain, palpitations or peripheral edema  GI: NEGATIVE for nausea, abdominal pain, heartburn, or change in bowel habits  : NEGATIVE for frequency, dysuria, or hematuria  MUSCULOSKELETAL: NEGATIVE for significant arthralgias or myalgia  NEURO: NEGATIVE for weakness, dizziness or paresthesias  ENDOCRINE: NEGATIVE for temperature intolerance, skin/hair changes  HEME: NEGATIVE for bleeding problems  PSYCHIATRIC: NEGATIVE for changes in mood or affect    OBJECTIVE:   /72  Pulse 62  Temp 97.6  F (36.4  C) (Oral)  Resp 16  Ht 5' (1.524 m)  Wt 90 lb 9.6 oz (41.1 kg)  SpO2 100%  BMI 17.69 kg/m2 Estimated body mass index is 17.69 kg/(m^2) as calculated from the following:    Height as of this encounter: 5' (1.524 m).    Weight as of this encounter: 90 lb 9.6 oz (41.1 kg).  EXAM:   GENERAL APPEARANCE: healthy, alert and no distress  EYES: Eyes grossly normal to inspection, PERRL and conjunctivae and sclerae normal  HENT: ear canals and TM's normal, nose and mouth without ulcers or lesions, oropharynx clear and oral mucous membranes moist  NECK: no adenopathy, no asymmetry, masses, or scars and thyroid normal to palpation  RESP: lungs clear to auscultation - no rales, rhonchi or wheezes  BREAST: normal without masses, tenderness or nipple discharge and no palpable axillary masses or adenopathy  CV: regular rate and rhythm, normal S1 S2, no S3 or S4, no murmur, click or rub, no peripheral edema and peripheral pulses strong  ABDOMEN: soft, nontender, no hepatosplenomegaly, no masses and bowel sounds normal  MS: no musculoskeletal defects are noted and gait is age appropriate without ataxia  SKIN: no suspicious lesions or rashes  NEURO: Normal strength and tone, sensory exam grossly normal, mentation intact and speech normal  PSYCH: mentation appears normal and affect normal/bright    Diagnostic Test Results:  No results found for this or any previous visit (from the past 24 hour(s)).    ASSESSMENT  / PLAN:       ICD-10-CM    1. Routine general medical examination at a health care facility Z00.00    2. Screening for HIV (human immunodeficiency virus) Z11.4 HIV Screening   3. CARDIOVASCULAR SCREENING; LDL GOAL LESS THAN 160 Z13.6 Lipid panel reflex to direct LDL Non-fasting   4. Non-toxic multinodular goiter E04.2 TSH with free T4 reflex       End of Life Planning:  Patient currently has an advanced directive: No.  I have verified the patient's ablity to prepare an advanced directive/make health care decisions.  Literature was provided to assist patient in preparing an advanced directive.    COUNSELING:  Reviewed preventive health counseling, as reflected in patient instructions    BP Readings from Last 1 Encounters:   10/11/18 118/72     Estimated body mass index is 17.69 kg/(m^2) as calculated from the following:    Height as of this encounter: 5' (1.524 m).    Weight as of this encounter: 90 lb 9.6 oz (41.1 kg).           reports that she has never smoked. She has never used smokeless tobacco.      Appropriate preventive services were discussed with this patient, including applicable screening as appropriate for cardiovascular disease, diabetes, osteopenia/osteoporosis, and glaucoma.  As appropriate for age/gender, discussed screening for colorectal cancer, prostate cancer, breast cancer, and cervical cancer. Checklist reviewing preventive services available has been given to the patient.    Reviewed patients plan of care and provided an AVS. The Basic Care Plan (routine screening as documented in Health Maintenance) for Talon meets the Care Plan requirement. This Care Plan has been established and reviewed with the Patient.    Counseling Resources:  ATP IV Guidelines  Pooled Cohorts Equation Calculator  Breast Cancer Risk Calculator  FRAX Risk Assessment  ICSI Preventive Guidelines  Dietary Guidelines for Americans, 2010  USDA's MyPlate  ASA Prophylaxis  Lung CA Screening    Donna Canales,  SKYLER  [facility]

## 2018-10-11 ENCOUNTER — OFFICE VISIT (OUTPATIENT)
Dept: FAMILY MEDICINE | Facility: CLINIC | Age: 68
End: 2018-10-11
Payer: COMMERCIAL

## 2018-10-11 VITALS
TEMPERATURE: 97.6 F | SYSTOLIC BLOOD PRESSURE: 118 MMHG | OXYGEN SATURATION: 100 % | RESPIRATION RATE: 16 BRPM | HEIGHT: 60 IN | HEART RATE: 62 BPM | DIASTOLIC BLOOD PRESSURE: 72 MMHG | WEIGHT: 90.6 LBS | BODY MASS INDEX: 17.79 KG/M2

## 2018-10-11 DIAGNOSIS — Z00.00 ROUTINE GENERAL MEDICAL EXAMINATION AT A HEALTH CARE FACILITY: Primary | ICD-10-CM

## 2018-10-11 DIAGNOSIS — E04.2 NON-TOXIC MULTINODULAR GOITER: ICD-10-CM

## 2018-10-11 DIAGNOSIS — Z13.6 CARDIOVASCULAR SCREENING; LDL GOAL LESS THAN 160: ICD-10-CM

## 2018-10-11 DIAGNOSIS — Z11.4 SCREENING FOR HIV (HUMAN IMMUNODEFICIENCY VIRUS): ICD-10-CM

## 2018-10-11 DIAGNOSIS — Z23 NEED FOR VACCINATION: ICD-10-CM

## 2018-10-11 LAB
CHOLEST SERPL-MCNC: 211 MG/DL
HDLC SERPL-MCNC: 83 MG/DL
LDLC SERPL CALC-MCNC: 108 MG/DL
NONHDLC SERPL-MCNC: 128 MG/DL
TRIGL SERPL-MCNC: 98 MG/DL
TSH SERPL DL<=0.005 MIU/L-ACNC: 0.64 MU/L (ref 0.4–4)

## 2018-10-11 PROCEDURE — 90732 PPSV23 VACC 2 YRS+ SUBQ/IM: CPT | Performed by: PHYSICIAN ASSISTANT

## 2018-10-11 PROCEDURE — 80061 LIPID PANEL: CPT | Performed by: PHYSICIAN ASSISTANT

## 2018-10-11 PROCEDURE — 36415 COLL VENOUS BLD VENIPUNCTURE: CPT | Performed by: PHYSICIAN ASSISTANT

## 2018-10-11 PROCEDURE — 99397 PER PM REEVAL EST PAT 65+ YR: CPT | Mod: 25 | Performed by: PHYSICIAN ASSISTANT

## 2018-10-11 PROCEDURE — 87389 HIV-1 AG W/HIV-1&-2 AB AG IA: CPT | Performed by: PHYSICIAN ASSISTANT

## 2018-10-11 PROCEDURE — 90471 IMMUNIZATION ADMIN: CPT | Performed by: PHYSICIAN ASSISTANT

## 2018-10-11 PROCEDURE — 84443 ASSAY THYROID STIM HORMONE: CPT | Performed by: PHYSICIAN ASSISTANT

## 2018-10-11 NOTE — MR AVS SNAPSHOT
After Visit Summary   10/11/2018    Talon SILVA Son    MRN: [id number]           Patient Information     Date Of Birth          1950        Visit Information        Provider Department      10/11/2018 9:00 AM Donna Canales PA-C [facility] [place]        Today's Diagnoses     Routine general medical examination at a health care facility    -  1    Screening for HIV (human immunodeficiency virus)        CARDIOVASCULAR SCREENING; LDL GOAL LESS THAN 160        Non-toxic multinodular goiter          Care Instructions    Calcium chew (Viactic)  Vitamin K2 daily  Vitamin D3 2,000 international units  Daily  Return to clinic for any new or worsening symptoms or go to ER Urgent care in off hours    Preventive Health Recommendations  Female Ages 65 +    Yearly exam:     See your health care provider every year in order to  o Review health changes.   o Discuss preventive care.    o Review your medicines if your doctor has prescribed any.      You no longer need a yearly Pap test unless you've had an abnormal Pap test in the past 10 years. If you have vaginal symptoms, such as bleeding or discharge, be sure to talk with your provider about a Pap test.      Every 1 to 2 years, have a mammogram.  If you are over 69, talk with your health care provider about whether or not you want to continue having screening mammograms.      Every 10 years, have a colonoscopy. Or, have a yearly FIT test (stool test). These exams will check for colon cancer.       Have a cholesterol test every 5 years, or more often if your doctor advises it.       Have a diabetes test (fasting glucose) every three years. If you are at risk for diabetes, you should have this test more often.       At age 65, have a bone density scan (DEXA) to check for osteoporosis (brittle bone disease).    Shots:    Get a flu shot each year.    Get a tetanus shot every 10 years.    Talk to your doctor about your pneumonia vaccines. There are  now two you should receive - Pneumovax (PPSV 23) and Prevnar (PCV 13).    Talk to your pharmacist about the shingles vaccine.    Talk to your doctor about the hepatitis B vaccine.    Nutrition:     Eat at least 5 servings of fruits and vegetables each day.      Eat whole-grain bread, whole-wheat pasta and brown rice instead of white grains and rice.      Get adequate about Calcium and Vitamin D.     Lifestyle    Exercise at least 150 minutes a week (30 minutes a day, 5 days a week). This will help you control your weight and prevent disease.      Limit alcohol to one drink per day.      No smoking.       Wear sunscreen to prevent skin cancer.       See your dentist twice a year for an exam and cleaning.      See your eye doctor every 1 to 2 years to screen for conditions such as glaucoma, macular degeneration, cataracts, etc           Follow-ups after your visit        Who to contact     If you have questions or need follow up information about today's clinic visit or your schedule please contact [facility] directly at [phone].  Normal or non-critical lab and imaging results will be communicated to you by CrowdTransferhart, letter or phone within 4 business days after the clinic has received the results. If you do not hear from us within 7 days, please contact the clinic through Sporterpilott or phone. If you have a critical or abnormal lab result, we will notify you by phone as soon as possible.  Submit refill requests through Wummelkiste or call your pharmacy and they will forward the refill request to us. Please allow 3 business days for your refill to be completed.          Additional Information About Your Visit        Wummelkiste Information     Wummelkiste gives you secure access to your electronic health record. If you see a primary care provider, you can also send messages to your care team and make appointments. If you have questions, please call your primary care clinic.  If you do not have a primary care  provider, please call [phone] and they will assist you.        Care EveryWhere ID     This is your Care EveryWhere ID. This could be used by other organizations to access your [place] medical records  [id number]        Your Vitals Were     Pulse Temperature Respirations Height Pulse Oximetry BMI (Body Mass Index)    62 97.6  F (36.4  C) (Oral) 16 5' (1.524 m) 100% 17.69 kg/m2       Blood Pressure from Last 3 Encounters:   10/11/18 118/72   04/18/18 123/78   01/08/18 112/62    Weight from Last 3 Encounters:   10/11/18 90 lb 9.6 oz (41.1 kg)   04/18/18 92 lb 14.4 oz (42.1 kg)   01/08/18 90 lb 14.4 oz (41.2 kg)              We Performed the Following     HIV Screening     Lipid panel reflex to direct LDL Non-fasting     TSH with free T4 reflex          Today's Medication Changes          These changes are accurate as of 10/11/18  9:06 AM.  If you have any questions, ask your nurse or doctor.               Stop taking these medicines if you haven't already. Please contact your care team if you have questions.     dimenhyDRINATE 50 MG Chew   Stopped by:  Donna Canales PA-C           IBUPROFEN PO   Stopped by:  Donna Canales PA-C           LORazepam 0.5 MG tablet   Commonly known as:  ATIVAN   Stopped by:  Donna Canales PA-C           meclizine 12.5 MG tablet   Commonly known as:  ANTIVERT   Stopped by:  Donna Canales PA-C           triamcinolone 0.1 % cream   Commonly known as:  KENALOG   Stopped by:  Donna Canales PA-C                    Primary Care Provider Office Phone # Fax #    Donna Canales PA-C [phone]       [address] [address]        Equal Access to Services     SAM ZAMORA AH: Hadii betzy coonnor hadasho Soomaali, waaxda luqadaha, qaybta kaalmada adeegyada, cristopher lieberman . [facility] [phone].    ATENCIÓN: Si habla español, tiene a maxwell disposición  servicios gratuitos de asistencia lingüística. Kellee taylor [phone].    We comply with applicable federal civil rights laws and [place] laws. We do not discriminate on the basis of race, color, national origin, age, disability, sex, sexual orientation, or gender identity.            Thank you!     Thank you for choosing [facility]  for your care. Our goal is always to provide you with excellent care. Hearing back from our patients is one way we can continue to improve our services. Please take a few minutes to complete the written survey that you may receive in the mail after your visit with us. Thank you!             Your Updated Medication List - Protect others around you: Learn how to safely use, store and throw away your medicines at www.disposemymeds.org.          This list is accurate as of 10/11/18  9:06 AM.  Always use your most recent med list.                   Brand Name Dispense Instructions for use Diagnosis    alendronate 70 MG tablet    FOSAMAX    12 tablet    Take 1 tablet (70 mg) by mouth every 7 days Take 60 minutes before am meal with 8 oz. water. Remain upright for 30 minutes.    Senile osteoporosis       Multi-vitamin Tabs tablet      Take 1 tablet by mouth daily        VITAMIN D (CHOLECALCIFEROL) PO      Take 1,000 Units by mouth daily

## 2018-10-12 LAB — HIV 1+2 AB+HIV1 P24 AG SERPL QL IA: NONREACTIVE

## 2018-11-13 ENCOUNTER — RADIANT APPOINTMENT (OUTPATIENT)
Dept: MAMMOGRAPHY | Facility: CLINIC | Age: 68
End: 2018-11-13
Attending: PHYSICIAN ASSISTANT
Payer: COMMERCIAL

## 2018-11-13 DIAGNOSIS — Z12.31 VISIT FOR SCREENING MAMMOGRAM: ICD-10-CM

## 2019-08-07 ENCOUNTER — OFFICE VISIT (OUTPATIENT)
Dept: ENDOCRINOLOGY | Facility: CLINIC | Age: 69
End: 2019-08-07
Payer: COMMERCIAL

## 2019-08-07 ENCOUNTER — ANCILLARY PROCEDURE (OUTPATIENT)
Dept: BONE DENSITY | Facility: CLINIC | Age: 69
End: 2019-08-07
Attending: INTERNAL MEDICINE
Payer: COMMERCIAL

## 2019-08-07 VITALS
SYSTOLIC BLOOD PRESSURE: 135 MMHG | DIASTOLIC BLOOD PRESSURE: 79 MMHG | WEIGHT: 90.7 LBS | HEIGHT: 60 IN | HEART RATE: 64 BPM | BODY MASS INDEX: 17.81 KG/M2

## 2019-08-07 DIAGNOSIS — M81.0 SENILE OSTEOPOROSIS: ICD-10-CM

## 2019-08-07 DIAGNOSIS — Z91.148 POOR COMPLIANCE WITH MEDICATION: ICD-10-CM

## 2019-08-07 DIAGNOSIS — E04.1 THYROID NODULE: ICD-10-CM

## 2019-08-07 DIAGNOSIS — M81.0 SENILE OSTEOPOROSIS: Primary | ICD-10-CM

## 2019-08-07 LAB
ANION GAP SERPL CALCULATED.3IONS-SCNC: 2 MMOL/L (ref 3–14)
BUN SERPL-MCNC: 16 MG/DL (ref 7–30)
CALCIUM SERPL-MCNC: 9.4 MG/DL (ref 8.5–10.1)
CHLORIDE SERPL-SCNC: 105 MMOL/L (ref 94–109)
CO2 SERPL-SCNC: 29 MMOL/L (ref 20–32)
CREAT SERPL-MCNC: 0.84 MG/DL (ref 0.52–1.04)
DEPRECATED CALCIDIOL+CALCIFEROL SERPL-MC: 57 UG/L (ref 20–75)
GFR SERPL CREATININE-BSD FRML MDRD: 72 ML/MIN/{1.73_M2}
GLUCOSE SERPL-MCNC: 88 MG/DL (ref 70–99)
POTASSIUM SERPL-SCNC: 3.6 MMOL/L (ref 3.4–5.3)
SODIUM SERPL-SCNC: 137 MMOL/L (ref 133–144)
T4 FREE SERPL-MCNC: 1 NG/DL (ref 0.76–1.46)
TSH SERPL DL<=0.005 MIU/L-ACNC: 0.64 MU/L (ref 0.4–4)

## 2019-08-07 ASSESSMENT — PAIN SCALES - GENERAL: PAINLEVEL: NO PAIN (0)

## 2019-08-07 ASSESSMENT — MIFFLIN-ST. JEOR: SCORE: 872.91

## 2019-08-07 NOTE — PROGRESS NOTES
- Endocrinology Initial Consultation -    Reason for visit/consult:     Senile osteoporosis  Thyroid nodule    Primary care provider: Donna Canales      Assessment and Plan  A 68 year old female with osteoporosis and multinodular goiter     #Osteoporosis   Diagnosed 4/2017 with DEXA scan noted above. Has been taking citracal petites (2 pills in the morning) for the past year. Was recommended to start IV reclast last year, but she did not do due to insurance.   Today she mentioned taking calcium supplement as well.  Running out Fosamax for several months.  Bone density is due.     - BMP (check creatinine) and vitamin D levels today   - Reclast ordered - will follow up for scheduling once approved by insurance  - resume of Citracal  two pills daily  - repeat DEXA scan this summer (after that I will contact patient and will discuss options including Reclast)    #Multinodular Goiter  Biopsies in 2012 and 2014 (both with nodules from right and left side sampled) - all negative. Patient denies any significant changes in her neck over the past year, but palpable nodule on exam. Thyroid function normal last year, and no symptoms to suggest changes.  Repeated to ultrasound in 2018 looks stable, already discussed with patient we will follow-up in 2 years.    - TSH, free T4  - next thyroid ultrasound in 2020    RTC: 1 year     Addendum  Patient went to bone density today and resutls I reivewed and communicated with patient. Still has osteoporosis but by personal review of original images slight increase, although not significant. She dose not want any drugs such as Fosamax or Reclast, I left message to continue calicum and weight bearing exercise.     Conclusions:  The most negative and valid T-score of -3.4 at the level of the L1 - L3 lumbar spine corresponds with osteoporosis according to WHO criteria for postmenopausal females and men  age 50 and over.         I spent 30 minutes with this patient face to face and explained the conditions and plans (more than 50% of time was counseling/coordination of care, went over detailed plan and timing of the test and treatment of osteoporosis and thyroid nodules) . The patient understood and is satisfied with today's visit. Return to clinic with me in 1 year.     Linda Hodgson MD  Staff Physician  Endocrinology and Metabolism  [facility]  License: [place] [id number]  Pager: [phone]    HPI:  67 yo female with PMH for osteoporosis (diagnosed with dexa 2017) on calcium/vitamin D, history of multinodular goiter (negative biopsies 2012, 2014) who presents today for follow up.    Talon notes she has been doing very well over the past year. She continues to take calcium and vitamin D daily (petite citracals) - 2 pills in the morning, which she tolerates well. She reports stable appetite, weight. She continues to be active with walking daily. 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 She has had no falls or fractures this year. Regarding her thyroid, she hasn't noticed any changes in her neck. She denies any difficulty swallowing or breathing. Recently she has been spending time with her sister who had a lung cancer resection last week.     Remainder ROS negative for headaches, vision changes, hearing changes, dry mouth/eyes, nausea, vomiting, chest pain, abdominal pain, diarrhea, constipation, LE swelling, dysuria, polyuria, hematuria, hematochezia.       Past Medical/Surgical History:  Past Medical History:   Diagnosis Date     Thyroid disease      Vertigo      Past Surgical History:   Procedure Laterality Date     APPENDECTOMY  09/17/12     COLONOSCOPY  10/2007    repeat 10 yrs     COLONOSCOPY  6/2014    repeat 10 yrs       Allergies:  Allergies   Allergen Reactions     Acetaminophen      tylenol     Aspirin [Dihydroxyaluminum Aminoacetate]        Current  Medications   Current Outpatient Medications   Medication     multivitamin, therapeutic with minerals (MULTI-VITAMIN) TABS     VITAMIN D, CHOLECALCIFEROL, PO     alendronate (FOSAMAX) 70 MG tablet     Calcium Citrate-Vitamin D (CALCIUM CITRATE PETITE/VIT D PO)     No current facility-administered medications for this visit.        Family History:  Family History   Problem Relation Age of Onset     Heart Disease Mother        Social History:  Social History     Tobacco Use     Smoking status: Never Smoker     Smokeless tobacco: Never Used   Substance Use Topics     Alcohol use: No       ROS:  Full review of systems taken with the help of the intake sheet. Otherwise a complete 14 point review of systems was taken and is negative unless stated in the history above.    Physical Exam:   /79   Pulse 64   Ht 1.524 m (5')   Wt 41.1 kg (90 lb 11.2 oz)   BMI 17.71 kg/m       General: well appearing, no acute distress, pleasant and conversant,   Mental Status/neuro: alert and oriented  Face: symmetrical, normal facial color  Eyes: anicteric, PERRL, no proptosis or lid lag  Neck: suppler, no lymphadenopahty  Thyroid: normal size and texture, no nodule palpable, no bruits  Heart: regular rhythm, S1S2, no murmur appreciated  Lung: clear to auscultation bilaterally  Abdomen: soft, NT/ND, no hepatomegaly  Legs: no swelling or edema  Feet: no deformities or ulcers, 2+ DP pulses, normal monofilament sensation      Labs : I reviewed data from epic and extract and summarize the pertinent data here.   Lab Results   Component Value Date     04/18/2018      Lab Results   Component Value Date    POTASSIUM 3.8 04/18/2018     Lab Results   Component Value Date    CHLORIDE 108 04/18/2018     Lab Results   Component Value Date    SHANAE 9.3 04/18/2018     Lab Results   Component Value Date    CO2 26 04/18/2018     Lab Results   Component Value Date    BUN 24 04/18/2018     Lab Results   Component Value Date    CR 0.83 04/18/2018      Lab Results   Component Value Date    GLC 84 04/18/2018     Lab Results   Component Value Date    TSH 0.64 10/11/2018     Lab Results   Component Value Date    T4 0.92 04/10/2017     Lab Results   Component Value Date    A1C 5.5 10/27/2016         MRI Brain: I personally reviewed the original images and agree with the below reports.   Results for orders placed during the hospital encounter of 06/27/17   MR Brain w/o & w Contrast    Narrative MRI BRAIN WITHOUT AND WITH CONTRAST June 27, 2017 12:26 PM    HISTORY: Persistent vertigo, vertical down beat nystagmus on exam,  evaluate for vertebrobasilar stroke, mass, or other neurodegenerative  disease.     TECHNIQUE: Multiplanar, multisequence MRI of the brain without and  with 4.5mL Gadavist IV.    COMPARISON: None.    FINDINGS: Diffusion-weighted images are normal. There is no evidence  for intracranial hemorrhage or acute infarct. A few tiny punctate  signal hyperintensities are seen in the supratentorial white matter  bilaterally. These are not associated with any mass effect or  enhancement. Thin section images through the internal auditory canals  show normal proximal seventh and eighth nerve roots bilaterally. The  cochlea and vestibular systems are normal in morphology and signal  characteristics. Incidental note is made of some bilateral anterior  inferior cerebellar artery loops into the proximal internal auditory  canals. Postcontrast images do not show any abnormal areas of  enhancement or any focal mass lesions. Vascular structures are patent  at the skull base. The mastoids do not contain any abnormal signal.      Impression IMPRESSION:  1. Few tiny nonspecific white matter lesions.  2. No evidence for intracranial hemorrhage, acute infarct, or any  focal mass lesions.    TONY MORENO MD       Bone density test 4/13/2017: I  Dual energy x-ray absorptiometry results:      Region BMD T - score Z - score   L1-L3 0.746 g/cm  -3.5 -1.2             Neck Left  0.688 g/cm  -2.5 -0.6   Total Left 0.769 g/cm  -1.9 -0.2             Neck Right 0.729 g/cm  -2.2 -0.3   Total Right 0.777 g/cm  -1.8 -0.1

## 2019-08-07 NOTE — LETTER
8/7/2019       RE: Talon SILVA Son  [address]  [facility] [address]     Dear Colleague,    Thank you for referring your patient, Talon SILVA Son, to the [facility] ENDOCRINOLOGY at [facility]. Please see a copy of my visit note below.                                                                               - Endocrinology Initial Consultation -    Reason for visit/consult:     Senile osteoporosis  Thyroid nodule    Primary care provider: Donna Canales      Assessment and Plan  A 68 year old female with osteoporosis and multinodular goiter     #Osteoporosis   Diagnosed 4/2017 with DEXA scan noted above. Has been taking citracal petites (2 pills in the morning) for the past year. Was recommended to start IV reclast last year, but she did not do due to insurance.   Today she mentioned taking calcium supplement as well.  Running out Fosamax for several months.  Bone density is due.     - BMP (check creatinine) and vitamin D levels today   - Reclast ordered - will follow up for scheduling once approved by insurance  - resume of Citracal  two pills daily  - repeat DEXA scan this summer (after that I will contact patient and will discuss options including Reclast)    #Multinodular Goiter  Biopsies in 2012 and 2014 (both with nodules from right and left side sampled) - all negative. Patient denies any significant changes in her neck over the past year, but palpable nodule on exam. Thyroid function normal last year, and no symptoms to suggest changes.  Repeated to ultrasound in 2018 looks stable, already discussed with patient we will follow-up in 2 years.    - TSH, free T4  - next thyroid ultrasound in 2020    RTC: 1 year     Addendum  Patient went to bone density today and resutls I reivewed and communicated with patient. Still has osteoporosis but by personal review of original images slight increase, although not significant. She dose not want any drugs  such as Fosamax or Reclast, I left message to continue calicum and weight bearing exercise.     Conclusions:  The most negative and valid T-score of -3.4 at the level of the L1 - L3 lumbar spine corresponds with osteoporosis according to WHO criteria for postmenopausal females and men age 50 and over.         I spent 30 minutes with this patient face to face and explained the conditions and plans (more than 50% of time was counseling/coordination of care, went over detailed plan and timing of the test and treatment of osteoporosis and thyroid nodules) . The patient understood and is satisfied with today's visit. Return to clinic with me in 1 year.     Linda Hodgson MD  Staff Physician  Endocrinology and Metabolism  [facility]  License: [place] [id number]  Pager: [phone]    HPI:  69 yo female with PMH for osteoporosis (diagnosed with dexa 2017) on calcium/vitamin D, history of multinodular goiter (negative biopsies 2012, 2014) who presents today for follow up.    Talon notes she has been doing very well over the past year. She continues to take calcium and vitamin D daily (petite citracals) - 2 pills in the morning, which she tolerates well. She reports stable appetite, weight. She continues to be active with walking daily. 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 She has had no falls or fractures this year. Regarding her thyroid, she hasn't noticed any changes in her neck. She denies any difficulty swallowing or breathing. Recently she has been spending time with her sister who had a lung cancer resection last week.     Remainder ROS negative for headaches, vision changes, hearing changes, dry mouth/eyes, nausea, vomiting, chest pain, abdominal pain, diarrhea, constipation, LE swelling, dysuria, polyuria, hematuria, hematochezia.       Past Medical/Surgical History:  Past Medical History:   Diagnosis Date     Thyroid disease      Vertigo      Past Surgical  History:   Procedure Laterality Date     APPENDECTOMY  09/17/12     COLONOSCOPY  10/2007    repeat 10 yrs     COLONOSCOPY  6/2014    repeat 10 yrs       Allergies:  Allergies   Allergen Reactions     Acetaminophen      tylenol     Aspirin [Dihydroxyaluminum Aminoacetate]        Current Medications   Current Outpatient Medications   Medication     multivitamin, therapeutic with minerals (MULTI-VITAMIN) TABS     VITAMIN D, CHOLECALCIFEROL, PO     alendronate (FOSAMAX) 70 MG tablet     Calcium Citrate-Vitamin D (CALCIUM CITRATE PETITE/VIT D PO)     No current facility-administered medications for this visit.        Family History:  Family History   Problem Relation Age of Onset     Heart Disease Mother        Social History:  Social History     Tobacco Use     Smoking status: Never Smoker     Smokeless tobacco: Never Used   Substance Use Topics     Alcohol use: No       ROS:  Full review of systems taken with the help of the intake sheet. Otherwise a complete 14 point review of systems was taken and is negative unless stated in the history above.    Physical Exam:   /79   Pulse 64   Ht 1.524 m (5')   Wt 41.1 kg (90 lb 11.2 oz)   BMI 17.71 kg/m        General: well appearing, no acute distress, pleasant and conversant,   Mental Status/neuro: alert and oriented  Face: symmetrical, normal facial color  Eyes: anicteric, PERRL, no proptosis or lid lag  Neck: suppler, no lymphadenopahty  Thyroid: normal size and texture, no nodule palpable, no bruits  Heart: regular rhythm, S1S2, no murmur appreciated  Lung: clear to auscultation bilaterally  Abdomen: soft, NT/ND, no hepatomegaly  Legs: no swelling or edema  Feet: no deformities or ulcers, 2+ DP pulses, normal monofilament sensation      Labs : I reviewed data from epic and extract and summarize the pertinent data here.   Lab Results   Component Value Date     04/18/2018      Lab Results   Component Value Date    POTASSIUM 3.8 04/18/2018     Lab Results    Component Value Date    CHLORIDE 108 04/18/2018     Lab Results   Component Value Date    SHANAE 9.3 04/18/2018     Lab Results   Component Value Date    CO2 26 04/18/2018     Lab Results   Component Value Date    BUN 24 04/18/2018     Lab Results   Component Value Date    CR 0.83 04/18/2018     Lab Results   Component Value Date    GLC 84 04/18/2018     Lab Results   Component Value Date    TSH 0.64 10/11/2018     Lab Results   Component Value Date    T4 0.92 04/10/2017     Lab Results   Component Value Date    A1C 5.5 10/27/2016         MRI Brain: I personally reviewed the original images and agree with the below reports.   Results for orders placed during the hospital encounter of 06/27/17   MR Brain w/o & w Contrast    Narrative MRI BRAIN WITHOUT AND WITH CONTRAST June 27, 2017 12:26 PM    HISTORY: Persistent vertigo, vertical down beat nystagmus on exam,  evaluate for vertebrobasilar stroke, mass, or other neurodegenerative  disease.     TECHNIQUE: Multiplanar, multisequence MRI of the brain without and  with 4.5mL Gadavist IV.    COMPARISON: None.    FINDINGS: Diffusion-weighted images are normal. There is no evidence  for intracranial hemorrhage or acute infarct. A few tiny punctate  signal hyperintensities are seen in the supratentorial white matter  bilaterally. These are not associated with any mass effect or  enhancement. Thin section images through the internal auditory canals  show normal proximal seventh and eighth nerve roots bilaterally. The  cochlea and vestibular systems are normal in morphology and signal  characteristics. Incidental note is made of some bilateral anterior  inferior cerebellar artery loops into the proximal internal auditory  canals. Postcontrast images do not show any abnormal areas of  enhancement or any focal mass lesions. Vascular structures are patent  at the skull base. The mastoids do not contain any abnormal signal.      Impression IMPRESSION:  1. Few tiny nonspecific white  matter lesions.  2. No evidence for intracranial hemorrhage, acute infarct, or any  focal mass lesions.    TONY MORENO MD       Bone density test 4/13/2017: I  Dual energy x-ray absorptiometry results:      Region BMD T - score Z - score   L1-L3 0.746 g/cm  -3.5 -1.2             Neck Left 0.688 g/cm  -2.5 -0.6   Total Left 0.769 g/cm  -1.9 -0.2             Neck Right 0.729 g/cm  -2.2 -0.3   Total Right 0.777 g/cm  -1.8 -0.1   Again, thank you for allowing me to participate in the care of your patient.      Sincerely,    Linda Hodgson MD

## 2019-08-07 NOTE — PATIENT INSTRUCTIONS
"To expedite your medication refill(s), please contact your pharmacy and have them fax a refill request to: [phone].  *Please allow 3 business days for routine medication refills.  *Please allow 5 business days for controlled substance medication refills.  --------------------  To schedule an appointment (including lab work) you can reach our clinic schedulers at [phone], or you can schedule any follow up appointment directly through [employer] by clicking on the \"Visits\" tab and selecting \"Schedule an Appointment.\"    To ask a question to your Endocrine care team, please send them a [employer] message, or reach them by phone at [phone] and press option #3.    For after-hours urgent Endocrine issues, that do not require 911, please dial [phone], and ask to speak with the Endocrinologist On-Call.    For questions related to your bill, please contact:  [facility]: [phone]  [facility]: [phone]    If you do not have enough, or have no insurance for your care, or have questions about possible costs and coverage, please reach out to our [facility] Financial Counselors to discuss with them any options you may have. To reach them, please call [phone].    --------------------  Please Note: If you are active on [employer], all future test results will be sent by [employer] message only and will no longer be sent by mail. You may also receive communication directly from your physician.    "

## 2019-09-29 ENCOUNTER — HEALTH MAINTENANCE LETTER (OUTPATIENT)
Age: 69
End: 2019-09-29

## 2019-11-12 ENCOUNTER — ANCILLARY PROCEDURE (OUTPATIENT)
Dept: MAMMOGRAPHY | Facility: CLINIC | Age: 69
End: 2019-11-12
Attending: PHYSICIAN ASSISTANT
Payer: COMMERCIAL

## 2019-11-12 DIAGNOSIS — Z12.31 SCREENING MAMMOGRAM FOR HIGH-RISK PATIENT: ICD-10-CM

## 2020-03-15 ENCOUNTER — HEALTH MAINTENANCE LETTER (OUTPATIENT)
Age: 70
End: 2020-03-15

## 2020-12-10 NOTE — PATIENT INSTRUCTIONS
Cholesterol check today  Schedule mammogram  Take metamucil everyday  Return to clinic for any new or worsening symptoms or go to ER Urgent care in off hours    Preventive Health Recommendations    See your health care provider every year to    Review health changes.     Discuss preventive care.      Review your medicines if your doctor has prescribed any.      You no longer need a yearly Pap test unless you've had an abnormal Pap test in the past 10 years. If you have vaginal symptoms, such as bleeding or discharge, be sure to talk with your provider about a Pap test.      Every 1 to 2 years, have a mammogram.  If you are over 69, talk with your health care provider about whether or not you want to continue having screening mammograms.      Every 10 years, have a colonoscopy. Or, have a yearly FIT test (stool test). These exams will check for colon cancer.       Have a cholesterol test every 5 years, or more often if your doctor advises it.       Have a diabetes test (fasting glucose) every three years. If you are at risk for diabetes, you should have this test more often.       At age 65, have a bone density scan (DEXA) to check for osteoporosis (brittle bone disease).    Shots:    Get a flu shot each year.    Get a tetanus shot every 10 years.    Talk to your doctor about your pneumonia vaccines. There are now two you should receive - Pneumovax (PPSV 23) and Prevnar (PCV 13).    Talk to your pharmacist about the shingles vaccine.    Talk to your doctor about the hepatitis B vaccine.    Nutrition:     Eat at least 5 servings of fruits and vegetables each day.      Eat whole-grain bread, whole-wheat pasta and brown rice instead of white grains and rice.      Get adequate about Calcium and Vitamin D.     Lifestyle    Exercise at least 150 minutes a week (30 minutes a day, 5 days a week). This will help you control your weight and prevent disease.      Limit alcohol to one drink per day.      No smoking.       Wear  sunscreen to prevent skin cancer.       See your dentist twice a year for an exam and cleaning.      See your eye doctor every 1 to 2 years to screen for conditions such as glaucoma, macular degeneration, cataracts, etc.    Personalized Prevention Plan  You are due for the preventive services outlined below.  Your care team is available to assist you in scheduling these services.  If you have already completed any of these items, please share that information with your care team to update in your medical record.    Health Maintenance Due   Topic Date Due     Discuss Advance Care Planning  08/20/2017     FALL RISK ASSESSMENT  10/11/2019     PHQ-2  01/01/2020     Flu Vaccine (1) 09/01/2020     Patient Education   Personalized Prevention Plan  You are due for the preventive services outlined below.  Your care team is available to assist you in scheduling these services.  If you have already completed any of these items, please share that information with your care team to update in your medical record.  Health Maintenance Due   Topic Date Due     FALL RISK ASSESSMENT  10/11/2019     Flu Vaccine (1) 09/01/2020

## 2020-12-10 NOTE — PROGRESS NOTES
SUBJECTIVE:   CC: Talon SILVA Son is an 70 year old woman who presents for preventive health visit.       Patient has been advised of split billing requirements and indicates understanding: No  Healthy Habits:    Do you get at least three servings of calcium containing foods daily (dairy, green leafy vegetables, etc.)? yes    Amount of exercise or daily activities, outside of work: exercise at work     Problems taking medications regularly No    Medication side effects: No    Have you had an eye exam in the past two years? no    Do you see a dentist twice per year? no    Do you have sleep apnea, excessive snoring or daytime drowsiness?no    Had some abdominal pain a few weeks ago, right over the umbilicus. Up to a 9/10. It was intermittent, lasted for a few min and came on every 6-8 hours or so. Eventually it went away on it's own last week. The pain was sharp. She admits she has difficulty with getting her stool out sometimes.           Today's PHQ-2 Score:   PHQ-2 ( 1999 Pf[employer]) 12/11/2020 8/7/2019   Q1: Little interest or pleasure in doing things 0 0   Q2: Feeling down, depressed or hopeless 0 0   PHQ-2 Score 0 0       Abuse: Current or Past(Physical, Sexual or Emotional)- No  Do you feel safe in your environment? Yes    Have you ever done Advance Care Planning? (For example, a Health Directive, POLST, or a discussion with a medical provider or your loved ones about your wishes): No, advance care planning information given to patient to review.  Patient plans to discuss their wishes with loved ones or provider.      Social History     Tobacco Use     Smoking status: Never Smoker     Smokeless tobacco: Never Used   Substance Use Topics     Alcohol use: No     If you drink alcohol do you typically have >3 drinks per day or >7 drinks per week? No                     Reviewed orders with patient.  Reviewed health maintenance and updated orders accordingly - Yes  Lab work is in process  Labs reviewed in EPIC  BP  Readings from Last 3 Encounters:   12/11/20 130/80   08/07/19 135/79   10/11/18 118/72    Wt Readings from Last 3 Encounters:   12/11/20 39.9 kg (88 lb 0.5 oz)   08/07/19 41.1 kg (90 lb 11.2 oz)   10/11/18 41.1 kg (90 lb 9.6 oz)                  Patient Active Problem List   Diagnosis     Non-toxic multinodular goiter     CARDIOVASCULAR SCREENING; LDL GOAL LESS THAN 160     Advanced directives, counseling/discussion     Osteoporosis     Senile osteoporosis     Past Surgical History:   Procedure Laterality Date     APPENDECTOMY  09/17/12     COLONOSCOPY  10/2007    repeat 10 yrs     COLONOSCOPY  6/2014    repeat 10 yrs       Social History     Tobacco Use     Smoking status: Never Smoker     Smokeless tobacco: Never Used   Substance Use Topics     Alcohol use: No     Family History   Problem Relation Age of Onset     Heart Disease Mother          Current Outpatient Medications   Medication Sig Dispense Refill     Calcium Citrate-Vitamin D (CALCIUM CITRATE PETITE/VIT D PO)        multivitamin, therapeutic with minerals (MULTI-VITAMIN) TABS Take 1 tablet by mouth daily       psyllium (METAMUCIL SMOOTH TEXTURE) 28 % packet Take one packet in 8 ounces of water daily 90 each 3     VITAMIN D, CHOLECALCIFEROL, PO Take 1,000 Units by mouth daily       alendronate (FOSAMAX) 70 MG tablet Take 1 tablet (70 mg) by mouth every 7 days Take 60 minutes before am meal with 8 oz. water. Remain upright for 30 minutes. (Patient not taking: Reported on 8/7/2019) 12 tablet 3       Mammogram Screening: Patient over age 50, mutual decision to screen reflected in health maintenance.    Pertinent mammograms are reviewed under the imaging tab.  History of abnormal Pap smear: NO - age 65 - see link Cervical Cytology Screening Guidelines  PAP / HPV Latest Ref Rng & Units 10/27/2016 8/20/2012   PAP - NIL UNSAT   HPV 16 DNA NEG Negative -   HPV 18 DNA NEG Negative -   OTHER HR HPV NEG Negative -     Reviewed and updated as needed this visit by  clinical staff  Tobacco  Allergies  Meds   Med Hx  Surg Hx  Fam Hx  Soc Hx        Reviewed and updated as needed this visit by Provider                    ROS:  CONSTITUTIONAL: NEGATIVE for fever, chills, change in weight  INTEGUMENTARY/SKIN: NEGATIVE for worrisome rashes, moles or lesions  EYES: NEGATIVE for vision changes or irritation  ENT: NEGATIVE for ear, mouth and throat problems  RESP: NEGATIVE for significant cough or SOB  BREAST: NEGATIVE for masses, tenderness or discharge  CV: NEGATIVE for chest pain, palpitations or peripheral edema  GI: NEGATIVE for diarrhea, dysphagia, heartburn or reflux, hematemesis, hematochezia, jaundice, melena, nausea, poor appetite, vomiting and weight loss  : NEGATIVE for unusual urinary or vaginal symptoms. No vaginal bleeding.  MUSCULOSKELETAL: NEGATIVE for significant arthralgias or myalgia  NEURO: NEGATIVE for weakness, dizziness or paresthesias  ENDOCRINE: NEGATIVE for temperature intolerance, skin/hair changes  PSYCHIATRIC: NEGATIVE for changes in mood or affect     OBJECTIVE:   /80   Pulse 70   Temp 98.7  F (37.1  C) (Oral)   Wt 39.9 kg (88 lb 0.5 oz)   LMP  (LMP Unknown)   SpO2 99%   Breastfeeding No   BMI 17.19 kg/m    EXAM:  GENERAL: healthy, alert and no distress  EYES: Eyes grossly normal to inspection, PERRL and conjunctivae and sclerae normal  HENT: ear canals and TM's normal, nose and mouth without ulcers or lesions  NECK: no adenopathy, no asymmetry, masses, or scars and thyroid with left sided non-tender nodule.   RESP: lungs clear to auscultation - no rales, rhonchi or wheezes  BREAST: normal without masses, tenderness or nipple discharge and no palpable axillary masses or adenopathy  CV: regular rate and rhythm, normal S1 S2, no S3 or S4, no murmur, click or rub, no peripheral edema and peripheral pulses strong  ABDOMEN: soft, nontender, no hepatosplenomegaly, no masses and bowel sounds normal  MS: no gross musculoskeletal defects noted,  no edema  SKIN: no suspicious lesions or rashes  NEURO: Normal strength and tone, mentation intact and speech normal  PSYCH: mentation appears normal, affect normal/bright    Diagnostic Test Results:  Labs reviewed in Epic    ASSESSMENT/PLAN:       ICD-10-CM    1. Routine general medical examination at a health care facility  Z00.00    2. Slow transit constipation  K59.01 psyllium (METAMUCIL SMOOTH TEXTURE) 28 % packet   3. Encounter for screening mammogram for malignant neoplasm of breast  Z12.31 *MA Screening Digital Bilateral   4. CARDIOVASCULAR SCREENING; LDL GOAL LESS THAN 160  Z13.6 Lipid panel reflex to direct LDL Fasting   5. Non-toxic multinodular goiter  E04.2    6. Senile osteoporosis  M81.0      Abdominal pain was most likely gas pain from constipation. She'll start psyllium daily. Thyroid nodule and osteoporosis managed by endo. Return to clinic for any new or worsening symptoms or go to ER Urgent care in off hours    Patient has been advised of split billing requirements and indicates understanding: Yes  COUNSELING:   Reviewed preventive health counseling, as reflected in patient instructions    Estimated body mass index is 17.19 kg/m  as calculated from the following:    Height as of 8/7/19: 1.524 m (5').    Weight as of this encounter: 39.9 kg (88 lb 0.5 oz).        She reports that she has never smoked. She has never used smokeless tobacco.      Counseling Resources:  ATP IV Guidelines  Pooled Cohorts Equation Calculator  Breast Cancer Risk Calculator  BRCA-Related Cancer Risk Assessment: FHS-7 Tool  FRAX Risk Assessment  ICSI Preventive Guidelines  Dietary Guidelines for Americans, 2010  USDA's MyPlate  ASA Prophylaxis  Lung CA Screening    Donna Larsen PA-C  [facility]

## 2020-12-11 ENCOUNTER — OFFICE VISIT (OUTPATIENT)
Dept: FAMILY MEDICINE | Facility: CLINIC | Age: 70
End: 2020-12-11
Payer: COMMERCIAL

## 2020-12-11 VITALS
HEART RATE: 70 BPM | BODY MASS INDEX: 17.19 KG/M2 | TEMPERATURE: 98.7 F | OXYGEN SATURATION: 99 % | WEIGHT: 88.03 LBS | DIASTOLIC BLOOD PRESSURE: 80 MMHG | SYSTOLIC BLOOD PRESSURE: 130 MMHG

## 2020-12-11 DIAGNOSIS — Z12.31 ENCOUNTER FOR SCREENING MAMMOGRAM FOR MALIGNANT NEOPLASM OF BREAST: ICD-10-CM

## 2020-12-11 DIAGNOSIS — K59.01 SLOW TRANSIT CONSTIPATION: ICD-10-CM

## 2020-12-11 DIAGNOSIS — E04.2 NON-TOXIC MULTINODULAR GOITER: ICD-10-CM

## 2020-12-11 DIAGNOSIS — Z00.00 ENCOUNTER FOR MEDICARE ANNUAL WELLNESS EXAM: ICD-10-CM

## 2020-12-11 DIAGNOSIS — M81.0 SENILE OSTEOPOROSIS: ICD-10-CM

## 2020-12-11 DIAGNOSIS — Z00.00 ROUTINE GENERAL MEDICAL EXAMINATION AT A HEALTH CARE FACILITY: Primary | ICD-10-CM

## 2020-12-11 DIAGNOSIS — Z13.6 CARDIOVASCULAR SCREENING; LDL GOAL LESS THAN 160: ICD-10-CM

## 2020-12-11 PROBLEM — H81.10 BPPV (BENIGN PAROXYSMAL POSITIONAL VERTIGO), UNSPECIFIED LATERALITY: Status: RESOLVED | Noted: 2017-06-22 | Resolved: 2020-12-11

## 2020-12-11 LAB
CHOLEST SERPL-MCNC: 219 MG/DL
HDLC SERPL-MCNC: 84 MG/DL
LDLC SERPL CALC-MCNC: 122 MG/DL
NONHDLC SERPL-MCNC: 135 MG/DL
TRIGL SERPL-MCNC: 67 MG/DL

## 2020-12-11 PROCEDURE — 36415 COLL VENOUS BLD VENIPUNCTURE: CPT | Performed by: PHYSICIAN ASSISTANT

## 2020-12-11 PROCEDURE — 99397 PER PM REEVAL EST PAT 65+ YR: CPT | Performed by: PHYSICIAN ASSISTANT

## 2020-12-11 PROCEDURE — 80061 LIPID PANEL: CPT | Performed by: PHYSICIAN ASSISTANT

## 2020-12-11 PROCEDURE — 99213 OFFICE O/P EST LOW 20 MIN: CPT | Mod: 25 | Performed by: PHYSICIAN ASSISTANT

## 2020-12-15 DIAGNOSIS — E78.5 HYPERLIPIDEMIA LDL GOAL <100: Primary | ICD-10-CM

## 2021-01-14 ENCOUNTER — HEALTH MAINTENANCE LETTER (OUTPATIENT)
Age: 71
End: 2021-01-14

## 2021-02-02 ENCOUNTER — ANCILLARY PROCEDURE (OUTPATIENT)
Dept: MAMMOGRAPHY | Facility: CLINIC | Age: 71
End: 2021-02-02
Attending: PHYSICIAN ASSISTANT
Payer: COMMERCIAL

## 2021-02-02 DIAGNOSIS — Z12.31 ENCOUNTER FOR SCREENING MAMMOGRAM FOR MALIGNANT NEOPLASM OF BREAST: ICD-10-CM

## 2021-02-02 PROCEDURE — 77067 SCR MAMMO BI INCL CAD: CPT | Mod: GC | Performed by: RADIOLOGY

## 2021-02-02 PROCEDURE — 77063 BREAST TOMOSYNTHESIS BI: CPT | Mod: GC | Performed by: RADIOLOGY

## 2021-03-17 DIAGNOSIS — E78.5 HYPERLIPIDEMIA LDL GOAL <100: ICD-10-CM

## 2021-03-17 LAB
CHOLEST SERPL-MCNC: 217 MG/DL
HDLC SERPL-MCNC: 86 MG/DL
LDLC SERPL CALC-MCNC: 108 MG/DL
NONHDLC SERPL-MCNC: 131 MG/DL
TRIGL SERPL-MCNC: 113 MG/DL

## 2021-03-17 PROCEDURE — 36415 COLL VENOUS BLD VENIPUNCTURE: CPT | Performed by: PHYSICIAN ASSISTANT

## 2021-03-17 PROCEDURE — 80061 LIPID PANEL: CPT | Performed by: PHYSICIAN ASSISTANT

## 2021-03-18 ENCOUNTER — OFFICE VISIT (OUTPATIENT)
Dept: OPHTHALMOLOGY | Facility: CLINIC | Age: 71
End: 2021-03-18
Payer: COMMERCIAL

## 2021-03-18 DIAGNOSIS — H25.813 COMBINED FORMS OF AGE-RELATED CATARACT OF BOTH EYES: ICD-10-CM

## 2021-03-18 DIAGNOSIS — H52.03 HYPERMETROPIA OF BOTH EYES: Primary | ICD-10-CM

## 2021-03-18 DIAGNOSIS — H11.002 PTERYGIUM, LEFT: ICD-10-CM

## 2021-03-18 DIAGNOSIS — D23.10 PAPILLOMA OF EYELID, RIGHT: ICD-10-CM

## 2021-03-18 PROCEDURE — 92004 COMPRE OPH EXAM NEW PT 1/>: CPT | Performed by: OPTOMETRIST

## 2021-03-18 PROCEDURE — 92015 DETERMINE REFRACTIVE STATE: CPT | Performed by: OPTOMETRIST

## 2021-03-18 ASSESSMENT — CONF VISUAL FIELD
OS_NORMAL: 1
OD_NORMAL: 1
METHOD: COUNTING FINGERS

## 2021-03-18 ASSESSMENT — TONOMETRY
OD_IOP_MMHG: 19
OS_IOP_MMHG: 18
IOP_METHOD: TONOPEN

## 2021-03-18 ASSESSMENT — REFRACTION_MANIFEST
OS_AXIS: 120
OS_ADD: +2.50
OS_SPHERE: +1.50
OD_ADD: +2.50
OS_CYLINDER: +0.25
OD_SPHERE: +1.75
OD_AXIS: 077
OD_CYLINDER: +0.25

## 2021-03-18 ASSESSMENT — REFRACTION_WEARINGRX
OS_CYLINDER: +0.25
OS_AXIS: 120
OS_ADD: +2.50
OD_SPHERE: +2.00
OD_ADD: +2.50
SPECS_TYPE: PAL
OD_CYLINDER: +0.50
OS_SPHERE: +1.50
OD_AXIS: 077

## 2021-03-18 ASSESSMENT — VISUAL ACUITY
OD_CC: J3
OS_CC: J2
CORRECTION_TYPE: GLASSES
OD_CC+: +2
OS_CC+: -2
OD_CC: 20/50
OS_CC: 20/30
METHOD: SNELLEN - LINEAR

## 2021-03-18 ASSESSMENT — SLIT LAMP EXAM - LIDS: COMMENTS: NORMAL

## 2021-03-18 ASSESSMENT — EXTERNAL EXAM - RIGHT EYE: OD_EXAM: NORMAL

## 2021-03-18 ASSESSMENT — CUP TO DISC RATIO
OD_RATIO: 0.5
OS_RATIO: 0.4

## 2021-03-18 ASSESSMENT — EXTERNAL EXAM - LEFT EYE: OS_EXAM: NORMAL

## 2021-03-18 NOTE — PROGRESS NOTES
History  HPI     Annual Eye Exam     In both eyes.  Associated symptoms include glare.  Negative for eye pain, redness, tearing and dryness. Additional comments: Glasses broke               Comments     Has not been seeing as well distance and near over the past year. Bothered more by bright light making it harder to focus each eye.  Spot in left eye comes and goes like a fly. Glare affects the left eye more than the right, more noticeable in the past 4+ months.     Masha Burdick, COT COT 9:30 AM March 18, 2021             Last edited by Jasen Lee, OD on 3/18/2021 10:09 AM. (History)          Assessment/Plan  (H52.03) Hypermetropia of both eyes  (primary encounter diagnosis)  Comment: Hyperopic astigmatism with presbyopia both eyes   Plan: REFRACTION         Educated patient on condition and clinical findings. Dispensed spectacle prescription for full time wear. Monitor annually.    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 Monitor annually.    (H11.002) Pterygium, left  Comment: Asymptomatic, not acutely inflamed  Plan:  No treatment indicated at this time. Monitor annually.    (D23.10) Papilloma of eyelid, right  Comment: Asymptomatic, unaware  Plan:  No treatment indicated at this time. Monitor annually.    Return to clinic in 1 year for comprehensive eye exam.    Complete documentation of historical and exam elements from today's encounter can  be found in the full encounter summary report (not reduplicated in this progress  note). I personally obtained the chief complaint(s) and history of present illness. I  confirmed and edited as necessary the review of systems, past medical/surgical  history, family history, social history, and examination findings as documented by  others; and I examined the patient myself. I personally  reviewed the relevant tests,  images, and reports as documented above. I formulated and edited as necessary the  assessment and plan and discussed the findings and management plan with the  patient and family.    Jasen Lee OD, FAAO

## 2021-03-18 NOTE — NURSING NOTE
Chief Complaints and History of Present Illnesses   Patient presents with     Annual Eye Exam     Sergio winston      Chief Complaint(s) and History of Present Illness(es)     Annual Eye Exam     Laterality: both eyes    Associated symptoms: glare.  Negative for eye pain, redness, tearing and dryness    Comments: Sergio winston               Comments     Has not been seeing as well distance and near over the past year. Bothered more by bright light making it harder to focus each eye.  Spot in left eye comes and goes like a fly.  Flashes at times with left eye the past 4+ months.     Masha Burdick, COT COT 9:30 AM March 18, 2021

## 2021-06-14 ENCOUNTER — NURSE TRIAGE (OUTPATIENT)
Dept: FAMILY MEDICINE | Facility: CLINIC | Age: 71
End: 2021-06-14

## 2021-06-14 NOTE — TELEPHONE ENCOUNTER
Patient called stating when she woke up this AM her upper back it hurst. When she takes a deep breath in it is painful at a 6/8. Patient denies any difficult breathing. Patient denies cold symptoms. No fever or chest pain or injury. Patient was instructed to go to the ED if her symptoms worsen.     PLAN:   Transferred to scheduling to schedule for same day or tomorrow.     Jewels Contreras RN, BSN  [place] [facility]  June 14, 2021      Additional Information    Negative: Passed out (i.e., fainted, collapsed and was not responding)    Negative: Shock suspected (e.g., cold/pale/clammy skin, too weak to stand, low BP, rapid pulse)    Negative: Sounds like a life-threatening emergency to the triager    Negative: Major injury to the back (e.g., MVA, fall > 10 feet or 3 meters, penetrating injury, etc.)    Negative: Pain in the upper back over the ribs (rib cage) that radiates (travels) into the chest    Negative: Pain in the upper back over the ribs (rib cage) and worsened by coughing (or clearly increases with breathing)    Negative: SEVERE back pain of sudden onset and age > 60    Negative: SEVERE abdominal pain (e.g., excruciating)    Negative: Abdominal pain and age > 60    Negative: Unable to urinate (or only a few drops) and bladder feels very full    Negative: Loss of bladder or bowel control (urine or bowel incontinence; wetting self, leaking stool) of new onset    Negative: Numbness (loss of sensation) in groin or rectal area    Negative: Pain radiates into groin, scrotum    Negative: Blood in urine (red, pink, or tea-colored)    Negative: Vomiting and pain over lower ribs of back (i.e., flank - kidney area)    Negative: Weakness of a leg or foot (e.g., unable to bear weight, dragging foot)    Negative: Patient sounds very sick or weak to the triager    Negative: Fever > 100.4 F (38.0 C) and flank pain    Negative: Pain or burning with passing urine (urination)    Protocols used: BACK  PAIN-A-OH

## 2021-06-15 DIAGNOSIS — M54.50 ACUTE BILATERAL LOW BACK PAIN WITHOUT SCIATICA: Primary | ICD-10-CM

## 2021-06-15 DIAGNOSIS — E55.9 VITAMIN D DEFICIENCY: ICD-10-CM

## 2021-06-16 ENCOUNTER — OFFICE VISIT (OUTPATIENT)
Dept: FAMILY MEDICINE | Facility: CLINIC | Age: 71
End: 2021-06-16
Payer: COMMERCIAL

## 2021-06-16 VITALS
BODY MASS INDEX: 15.53 KG/M2 | WEIGHT: 79.5 LBS | TEMPERATURE: 98.5 F | DIASTOLIC BLOOD PRESSURE: 62 MMHG | SYSTOLIC BLOOD PRESSURE: 128 MMHG | HEART RATE: 61 BPM | OXYGEN SATURATION: 100 %

## 2021-06-16 DIAGNOSIS — S29.011A INTERCOSTAL MUSCLE STRAIN, INITIAL ENCOUNTER: Primary | ICD-10-CM

## 2021-06-16 PROCEDURE — 99213 OFFICE O/P EST LOW 20 MIN: CPT | Performed by: FAMILY MEDICINE

## 2021-06-16 NOTE — PROGRESS NOTES
Assessment & Plan     Intercostal muscle strain, initial encounter  Timing fits well with work being done, some delayed muscle soreness    Review of external notes as documented elsewhere in note  30 minutes spent on the date of the encounter doing chart review, history and exam, documentation and further activities per the note     Patient Instructions   Contact if pain returns. Probably needs chest XR      Return in about 2 weeks (around 6/30/2021).    Tone Huffman MD  [facility] MERY Hanley is a 70 year old who presents for the following health issues     HPI     Back Pain  Onset/Duration: 2 days  Description:   Location of pain: upper back right  Character of pain: sharp  Pain radiation: none  New numbness or weakness in legs, not attributed to pain: no   Intensity: Currently 2/10, mild  Progression of Symptoms: improving  History:   Specific cause: none  Pain interferes with job: not currently  History of back problems: no prior back problems  Any previous MRI or X-rays: None  Sees a specialist for back pain: No  Alleviating factors:   Improved by: Advil    Precipitating factors:  Worsened by: Lying Flat  Therapies tried and outcome: rest, finally has helped    Accompanying Signs & Symptoms:  Risk of Fracture: None  Risk of Cauda Equina: None  Risk of Infection: None  Risk of Cancer: None  Risk of Ankylosing Spondylitis: Onset at age <35, male, AND morning back stiffness  no     Review of Systems   Constitutional, HEENT, cardiovascular, pulmonary, gi and gu systems are negative, except as otherwise noted.      Objective    /62   Pulse 61   Temp 98.5  F (36.9  C)   Wt 36.1 kg (79 lb 8 oz)   LMP  (LMP Unknown)   SpO2 100%   BMI 15.53 kg/m    Body mass index is 15.53 kg/m .  Physical Exam   GENERAL: healthy, alert and mild distress  RESP: lungs clear to auscultation - no rales, rhonchi or wheezes  CV: regular rate and rhythm, normal S1 S2, no S3 or S4, no  murmur, click or rub, no peripheral edema and peripheral pulses strong  MS: Tenderness noted in chest wall in the general area of concern with light touch and light pressure  NEURO: Normal strength and tone, mentation intact and speech normal  PSYCH: mentation appears normal and anxious

## 2021-10-23 ENCOUNTER — HEALTH MAINTENANCE LETTER (OUTPATIENT)
Age: 71
End: 2021-10-23

## 2022-02-10 ENCOUNTER — ANCILLARY PROCEDURE (OUTPATIENT)
Dept: MAMMOGRAPHY | Facility: CLINIC | Age: 72
End: 2022-02-10
Attending: PHYSICIAN ASSISTANT
Payer: COMMERCIAL

## 2022-02-10 DIAGNOSIS — Z12.31 SCREENING MAMMOGRAM FOR HIGH-RISK PATIENT: ICD-10-CM

## 2022-02-10 PROCEDURE — 77063 BREAST TOMOSYNTHESIS BI: CPT | Mod: GC | Performed by: RADIOLOGY

## 2022-02-10 PROCEDURE — 77067 SCR MAMMO BI INCL CAD: CPT | Mod: GC | Performed by: RADIOLOGY

## 2022-02-12 ENCOUNTER — HEALTH MAINTENANCE LETTER (OUTPATIENT)
Age: 72
End: 2022-02-12

## 2022-03-21 NOTE — PROGRESS NOTES
"     SUBJECTIVE:   CC: Talon SILVA Son is an 71 year old woman who presents for preventive health visit.         Healthy Habits:     In general, how would you rate your overall health?  Good    Frequency of exercise:  None    Do you usually eat at least 4 servings of fruit and vegetables a day, include whole grains    & fiber and avoid regularly eating high fat or \"junk\" foods?  No    Taking medications regularly:  Not Applicable    Medication side effects:  Not applicable    Ability to successfully perform activities of daily living:  No assistance needed    Home Safety:  No safety concerns identified    Hearing Impairment:  No hearing concerns    In the past 6 months, have you been bothered by leaking of urine?  No    In general, how would you rate your overall mental or emotional health?  Good      PHQ-2 Total Score: 0    Additional concerns today:  No    Has always been underweight, her family too. Good nutrition  Lives alone, used to be with sisters   Others do her laundry and they use detergent and dryer sheets, gets dry itchy skin at her pants line  Wondering what to do about pain in fingers and thinks it's arthritis    Today's PHQ-2 Score:   PHQ-2 ( 1999 [employer]) 3/18/2021   Q1: Little interest or pleasure in doing things 0   Q2: Feeling down, depressed or hopeless 0   PHQ-2 Score 0   PHQ-2 Total Score (12-17 Years)- Positive if 3 or more points; Administer PHQ-A if positive 0       Abuse: Current or Past (Physical, Sexual or Emotional) - No  Do you feel safe in your environment? Yes        Social History     Tobacco Use     Smoking status: Never Smoker     Smokeless tobacco: Never Used   Substance Use Topics     Alcohol use: No     If you drink alcohol do you typically have >3 drinks per day or >7 drinks per week? Not applicable    Alcohol Use 10/27/2016   Prescreen: >3 drinks/day or >7 drinks/week? The patient does not drink >3 drinks per day nor >7 drinks per week.       Reviewed orders with patient.  Reviewed " health maintenance and updated orders accordingly - Yes  Lab work is in process  Labs reviewed in EPIC  BP Readings from Last 3 Encounters:   03/22/22 128/80   06/16/21 128/62   12/11/20 130/80    Wt Readings from Last 3 Encounters:   03/22/22 37.2 kg (82 lb)   06/16/21 36.1 kg (79 lb 8 oz)   12/11/20 39.9 kg (88 lb 0.5 oz)                  Patient Active Problem List   Diagnosis     Non-toxic multinodular goiter     CARDIOVASCULAR SCREENING; LDL GOAL LESS THAN 160     Advanced directives, counseling/discussion     Osteoporosis     Senile osteoporosis     Arthritis of both hands     Dry skin     Past Surgical History:   Procedure Laterality Date     APPENDECTOMY  09/17/12     COLONOSCOPY  10/2007    repeat 10 yrs     COLONOSCOPY  6/2014    repeat 10 yrs       Social History     Tobacco Use     Smoking status: Never Smoker     Smokeless tobacco: Never Used   Substance Use Topics     Alcohol use: No     Family History   Problem Relation Age of Onset     Heart Disease Mother      Glaucoma No family hx of      Macular Degeneration No family hx of          Current Outpatient Medications   Medication Sig Dispense Refill     emollient (VANICREAM) external cream Apply topically as needed for other (dry skin) 453 g 1     alendronate (FOSAMAX) 70 MG tablet Take 1 tablet (70 mg) by mouth every 7 days Take 60 minutes before am meal with 8 oz. water. Remain upright for 30 minutes. 12 tablet 3     Calcium Citrate-Vitamin D (CALCIUM CITRATE PETITE/VIT D PO)        multivitamin, therapeutic with minerals (MULTI-VITAMIN) TABS Take 1 tablet by mouth daily       psyllium (METAMUCIL SMOOTH TEXTURE) 28 % packet Take one packet in 8 ounces of water daily 90 each 3     VITAMIN D, CHOLECALCIFEROL, PO Take 1,000 Units by mouth daily       Allergies   Allergen Reactions     Acetaminophen      tylenol     Aspirin [Dihydroxyaluminum Aminoacetate]        Breast Cancer Screening:    FHS-7:   Breast CA Risk Assessment (FHS-7) 2/10/2022   Did any  of your first-degree relatives have breast or ovarian cancer? No   Did any of your relatives have bilateral breast cancer? No   Did any man in your family have breast cancer? No   Did any woman in your family have breast and ovarian cancer? No   Did any woman in your family have breast cancer before age 50 y? No   Do you have 2 or more relatives with breast and/or ovarian cancer? No   Do you have 2 or more relatives with breast and/or bowel cancer? No       Mammogram Screening: Recommended mammography every 1-2 years with patient discussion and risk factor consideration  Pertinent mammograms are reviewed under the imaging tab.    History of abnormal Pap smear: NO - age 65 - see link Cervical Cytology Screening Guidelines  PAP / HPV Latest Ref Rng & Units 10/27/2016 8/20/2012   PAP (Historical) - NIL UNSAT   HPV16 NEG Negative -   HPV18 NEG Negative -   HRHPV NEG Negative -     Reviewed and updated as needed this visit by clinical staff                  Reviewed and updated as needed this visit by Provider                 Past Medical History:   Diagnosis Date     Thyroid disease      Vertigo       Past Surgical History:   Procedure Laterality Date     APPENDECTOMY  09/17/12     COLONOSCOPY  10/2007    repeat 10 yrs     COLONOSCOPY  6/2014    repeat 10 yrs     OB History   No obstetric history on file.       Review of Systems  CONSTITUTIONAL: NEGATIVE for fever, chills, change in weight  INTEGUMENTARY/SKIN: NEGATIVE for worrisome rashes, moles or lesions  EYES: NEGATIVE for vision changes or irritation  ENT: NEGATIVE for ear, mouth and throat problems  RESP: NEGATIVE for significant cough or SOB  BREAST: NEGATIVE for masses, tenderness or discharge  CV: NEGATIVE for chest pain, palpitations or peripheral edema  GI: NEGATIVE for nausea, abdominal pain, heartburn, or change in bowel habits  : NEGATIVE for unusual urinary or vaginal symptoms. No vaginal bleeding.  MUSCULOSKELETAL: finger pains at times, NEGATIVE for  significant arthralgias or myalgia  NEURO: NEGATIVE for weakness, dizziness or paresthesias  PSYCHIATRIC: NEGATIVE for changes in mood or affect      OBJECTIVE:   /80   Pulse 59   Temp 99.6  F (37.6  C)   Ht 1.524 m (5')   Wt 37.2 kg (82 lb)   LMP  (LMP Unknown)   SpO2 100%   BMI 16.01 kg/m    Physical Exam  GENERAL: healthy, alert and no distress  EYES: Eyes grossly normal to inspection, PERRL and conjunctivae and sclerae normal  HENT: ear canals and TM's normal, nose and mouth without ulcers or lesions  NECK: no adenopathy, no asymmetry, masses, or scars and thyroid normal to palpation  RESP: lungs clear to auscultation - no rales, rhonchi or wheezes  BREAST: normal without masses, tenderness or nipple discharge and no palpable axillary masses or adenopathy  CV: regular rate and rhythm, normal S1 S2, no S3 or S4, no murmur, click or rub, no peripheral edema and peripheral pulses strong  ABDOMEN: soft, nontender, no hepatosplenomegaly, no masses and bowel sounds normal  MS: no gross musculoskeletal defects noted, no edema  SKIN: no suspicious lesions or rashes  NEURO: Normal strength and tone, mentation intact and speech normal  PSYCH: mentation appears normal, affect normal/bright    Diagnostic Test Results:  Labs reviewed in Epic  Results for orders placed or performed in visit on 03/22/22 (from the past 24 hour(s))   GLUCOSE   Result Value Ref Range    Glucose 94 70 - 99 mg/dL    Patient Fasting > 8hrs? Yes    Lipid panel reflex to direct LDL Fasting   Result Value Ref Range    Cholesterol 211 (H) <200 mg/dL    Triglycerides 91 <150 mg/dL    Direct Measure HDL 94 >=50 mg/dL    LDL Cholesterol Calculated 99 <=100 mg/dL    Non HDL Cholesterol 117 <130 mg/dL    Patient Fasting > 8hrs? Yes     Narrative    Cholesterol  Desirable:  <200 mg/dL    Triglycerides  Normal:  Less than 150 mg/dL  Borderline High:  150-199 mg/dL  High:  200-499 mg/dL  Very High:  Greater than or equal to 500 mg/dL    Direct  Measure HDL  Female:  Greater than or equal to 50 mg/dL   Male:  Greater than or equal to 40 mg/dL    LDL Cholesterol  Desirable:  <100mg/dL  Above Desirable:  100-129 mg/dL   Borderline High:  130-159 mg/dL   High:  160-189 mg/dL   Very High:  >= 190 mg/dL    Non HDL Cholesterol  Desirable:  130 mg/dL  Above Desirable:  130-159 mg/dL  Borderline High:  160-189 mg/dL  High:  190-219 mg/dL  Very High:  Greater than or equal to 220 mg/dL       ASSESSMENT/PLAN:       ICD-10-CM    1. Routine general medical examination at a health care facility  Z00.00 GLUCOSE     Lipid panel reflex to direct LDL Fasting     GLUCOSE     Lipid panel reflex to direct LDL Fasting   2. Dry skin  L85.3 emollient (VANICREAM) external cream   3. Arthritis of both hands  M19.041     M19.042    4. Need for vaccination  Z23 TDAP VACCINE (Adacel, Boostrix)  [[id number]]     Thin pt with healthy exam, can do tylenol for hands, vanicream for dry skin  Follow up in 1 year likely not needing fasting labs any more or rarely      COUNSELING:  Reviewed preventive health counseling, as reflected in patient instructions    Estimated body mass index is 15.53 kg/m  as calculated from the following:    Height as of 8/7/19: 1.524 m (5').    Weight as of 6/16/21: 36.1 kg (79 lb 8 oz).    Weight management plan noted, stable and monitoring    She reports that she has never smoked. She has never used smokeless tobacco.    Roomed by: Jessica Brenner   Counseling Resources:  ATP IV Guidelines  Pooled Cohorts Equation Calculator  Breast Cancer Risk Calculator  BRCA-Related Cancer Risk Assessment: FHS-7 Tool  FRAX Risk Assessment  ICSI Preventive Guidelines  Dietary Guidelines for Americans, 2010  USDA's MyPlate  ASA Prophylaxis  Lung CA Screening    Vesta Foss, JOSE [facility]

## 2022-03-21 NOTE — PATIENT INSTRUCTIONS
Preventive Health Recommendations    See your health care provider every year to    Review health changes.     Discuss preventive care.      Review your medicines if your doctor has prescribed any.      You no longer need a yearly Pap test unless you've had an abnormal Pap test in the past 10 years. If you have vaginal symptoms, such as bleeding or discharge, be sure to talk with your provider about a Pap test.      Every 1 to 2 years, have a mammogram.  If you are over 69, talk with your health care provider about whether or not you want to continue having screening mammograms.      Every 10 years, have a colonoscopy. Or, have a yearly FIT test (stool test). These exams will check for colon cancer.       Have a cholesterol test every 5 years, or more often if your doctor advises it.       Have a diabetes test (fasting glucose) every three years. If you are at risk for diabetes, you should have this test more often.       At age 65, have a bone density scan (DEXA) to check for osteoporosis (brittle bone disease).    Shots:    Get a flu shot each year.    Get a tetanus shot every 10 years.    Talk to your doctor about your pneumonia vaccines. There are now two you should receive - Pneumovax (PPSV 23) and Prevnar (PCV 13).    Talk to your pharmacist about the shingles vaccine.    Talk to your doctor about the hepatitis B vaccine.    Nutrition:     Eat at least 5 servings of fruits and vegetables each day.      Eat whole-grain bread, whole-wheat pasta and brown rice instead of white grains and rice.      Get adequate about Calcium and Vitamin D.     Lifestyle    Exercise at least 150 minutes a week (30 minutes a day, 5 days a week). This will help you control your weight and prevent disease.      Limit alcohol to one drink per day.      No smoking.       Wear sunscreen to prevent skin cancer.       See your dentist twice a year for an exam and cleaning.      See your eye doctor every 1 to 2 years to screen for  conditions such as glaucoma, macular degeneration, cataracts, etc.    Personalized Prevention Plan  You are due for the preventive services outlined below.  Your care team is available to assist you in scheduling these services.  If you have already completed any of these items, please share that information with your care team to update in your medical record.    Health Maintenance Due   Topic Date Due     ANNUAL REVIEW OF HM ORDERS  Never done     PHQ-2 (once per calendar year)  01/01/2022     FALL RISK ASSESSMENT  03/18/2022     Patient Education     Osteoarthritis  Osteoarthritis happens when the cartilage in a joint becomes damaged and worn. This may be from age, wear and tear, overuse of the joint, obesity, or other problems. Osteoarthritis can affect any joint. But it's most common in hands, knees, spine, hips, and feet. Symptoms include joint stiffness, and pain. It's also called degenerative joint disease.   Home care    When a joint is more sore than usual, rest it for a day or two.    Heat can help relieve stiffness. Take a hot bath or apply a heating pad for up to 30 minutes at a time. If symptoms are worse in the morning, using heat just after awakening can help relax the muscle and soothe the joints.     Ice helps relieve pain. It's often used after activity. Use a cold pack wrapped in a thin cloth on the joint for 10 to 15 minutes at a time.     Alternating hot and cold can also help relieve pain. Try this for 20 minutes at a time, several times per day.    Exercise helps prevent the muscles and ligaments around the joint from becoming weak. It also helps maintain function in the joint. Be as active as you can. Talk to your healthcare provider about what activity program is best for you.    Excess weight puts a lot of extra strain on weight-bearing joints of the lower back, hips, knees, feet and ankles. If you are overweight, talk to your healthcare provider about a safe and effective weight loss  program.    Use anti-inflammatory medicines as prescribed for pain. This includes acetaminophen or NSAIDs such as ibuprofen or naproxen. Don't take NSAIDs if your healthcare provider has told you that you can't take NSAIDS because of other health problems If needed, topical or injected medicines may be recommended. Talk with your healthcare provider if these options are not enough to manage your pain. Follow the directions on all over-the-counter medicines.    Talk with your healthcare provider about devices that might help improve your function and reduce pain.    Talk with you healthcare provider about physical therapy to help strengthen your joints and the surrounding muscles.    Follow-up care  Follow up with your healthcare provider, or as advised.   When to seek medical advice  Call your healthcare provider right away if any of these occur:    Redness or swelling of a painful joint    Discharge or pus from a painful joint    Fever of 100.4 F (38 C) or higher, or as directed by your healthcare provider    Worsening joint pain    Decreased ability to move the joint or bear weight on the joint  [employer] last reviewed this educational content on 8/1/2019 2000-2021 [employer]. All rights reserved. This information is not intended as a substitute for professional medical care. Always follow your healthcare professional's instructions.           Patient Education     Osteoarthritis: Common Sites  Osteoarthritis (OA) is sometimes called degenerative joint disease or wear-and-tear arthritis. It's the most common type of arthritis. In OA, the cartilage wears away. Cartilage covers the ends of bones and acts as a cushion. If enough cartilage wears away, bone rubs against bone. The joint changes in OA cause pain, stiffness, and trouble moving. OA may occur in any joint. Some joints that are commonly affected are the spine, neck,  hips, knees, fingers, and toes.  Neck    Joints between small bones in the  neck may wear out. Pain may travel to the shoulder or the base of the skull.  Lumbar spine  Bony spurs may form on the joints between the vertebrae (spinal bones). And disks (cushions of cartilage between vertebrae) may wear down. Pain may affect the lower back or leg.  Hip  Cartilage damage can occur in the large  ball and socket  joint that connects the pelvis and thighbone (femur). Pain may travel to the groin, buttocks, or knee.  Knee  The cartilage in the knee joint may wear down. Weakness or instability in the knee joint may make walking or climbing stairs difficult.  Fingers  Finger joints may become enlarged and knobby. Grasping objects may be hard, especially if the joint at the base of the thumb is affected.  Toes  Toes may be affected. Arthritis may cause a bunion, a bump at the base of the big toe. Standing or walking may be painful.  [employer] last reviewed this educational content on 6/1/2018 2000-2021 [employer]. All rights reserved. This information is not intended as a substitute for professional medical care. Always follow your healthcare professional's instructions.

## 2022-03-22 ENCOUNTER — OFFICE VISIT (OUTPATIENT)
Dept: FAMILY MEDICINE | Facility: CLINIC | Age: 72
End: 2022-03-22
Payer: COMMERCIAL

## 2022-03-22 VITALS
SYSTOLIC BLOOD PRESSURE: 128 MMHG | TEMPERATURE: 99.6 F | OXYGEN SATURATION: 100 % | WEIGHT: 82 LBS | BODY MASS INDEX: 16.1 KG/M2 | HEIGHT: 60 IN | HEART RATE: 59 BPM | DIASTOLIC BLOOD PRESSURE: 80 MMHG

## 2022-03-22 DIAGNOSIS — Z23 NEED FOR VACCINATION: ICD-10-CM

## 2022-03-22 DIAGNOSIS — M19.041 ARTHRITIS OF BOTH HANDS: ICD-10-CM

## 2022-03-22 DIAGNOSIS — Z00.00 ROUTINE GENERAL MEDICAL EXAMINATION AT A HEALTH CARE FACILITY: Primary | ICD-10-CM

## 2022-03-22 DIAGNOSIS — M19.042 ARTHRITIS OF BOTH HANDS: ICD-10-CM

## 2022-03-22 DIAGNOSIS — L85.3 DRY SKIN: ICD-10-CM

## 2022-03-22 LAB
CHOLEST SERPL-MCNC: 211 MG/DL
FASTING STATUS PATIENT QL REPORTED: YES
FASTING STATUS PATIENT QL REPORTED: YES
GLUCOSE BLD-MCNC: 94 MG/DL (ref 70–99)
HDLC SERPL-MCNC: 94 MG/DL
LDLC SERPL CALC-MCNC: 99 MG/DL
NONHDLC SERPL-MCNC: 117 MG/DL
TRIGL SERPL-MCNC: 91 MG/DL

## 2022-03-22 PROCEDURE — 36415 COLL VENOUS BLD VENIPUNCTURE: CPT | Performed by: NURSE PRACTITIONER

## 2022-03-22 PROCEDURE — 90471 IMMUNIZATION ADMIN: CPT | Performed by: NURSE PRACTITIONER

## 2022-03-22 PROCEDURE — 99397 PER PM REEVAL EST PAT 65+ YR: CPT | Mod: 25 | Performed by: NURSE PRACTITIONER

## 2022-03-22 PROCEDURE — 80061 LIPID PANEL: CPT | Performed by: NURSE PRACTITIONER

## 2022-03-22 PROCEDURE — 90715 TDAP VACCINE 7 YRS/> IM: CPT | Performed by: NURSE PRACTITIONER

## 2022-03-22 PROCEDURE — 82947 ASSAY GLUCOSE BLOOD QUANT: CPT | Performed by: NURSE PRACTITIONER

## 2022-03-22 RX ORDER — EMOLLIENT BASE
CREAM (GRAM) TOPICAL PRN
Qty: 453 G | Refills: 1 | Status: SHIPPED | OUTPATIENT
Start: 2022-03-22 | End: 2024-06-26

## 2022-03-22 ASSESSMENT — ACTIVITIES OF DAILY LIVING (ADL): CURRENT_FUNCTION: NO ASSISTANCE NEEDED

## 2022-03-24 ENCOUNTER — OFFICE VISIT (OUTPATIENT)
Dept: OPHTHALMOLOGY | Facility: CLINIC | Age: 72
End: 2022-03-24
Payer: COMMERCIAL

## 2022-03-24 DIAGNOSIS — D23.10 PAPILLOMA OF EYELID, RIGHT: ICD-10-CM

## 2022-03-24 DIAGNOSIS — H52.03 HYPERMETROPIA OF BOTH EYES: Primary | ICD-10-CM

## 2022-03-24 DIAGNOSIS — H35.363 RETINAL DRUSEN OF BOTH EYES: ICD-10-CM

## 2022-03-24 DIAGNOSIS — H25.813 COMBINED FORMS OF AGE-RELATED CATARACT OF BOTH EYES: ICD-10-CM

## 2022-03-24 DIAGNOSIS — H11.002 PTERYGIUM, LEFT: ICD-10-CM

## 2022-03-24 PROCEDURE — 92015 DETERMINE REFRACTIVE STATE: CPT | Performed by: OPTOMETRIST

## 2022-03-24 PROCEDURE — 92014 COMPRE OPH EXAM EST PT 1/>: CPT | Performed by: OPTOMETRIST

## 2022-03-24 ASSESSMENT — EXTERNAL EXAM - LEFT EYE: OS_EXAM: NORMAL

## 2022-03-24 ASSESSMENT — TONOMETRY
OS_IOP_MMHG: 15
IOP_METHOD: ICARE
OD_IOP_MMHG: 15

## 2022-03-24 ASSESSMENT — REFRACTION_WEARINGRX
SPECS_TYPE: PAL
OS_ADD: +2.50
OS_CYLINDER: +0.25
OS_AXIS: 120
OD_AXIS: 077
OD_SPHERE: +2.00
OS_SPHERE: +1.50
OD_CYLINDER: +0.50
OD_ADD: +2.50

## 2022-03-24 ASSESSMENT — SLIT LAMP EXAM - LIDS: COMMENTS: NORMAL

## 2022-03-24 ASSESSMENT — EXTERNAL EXAM - RIGHT EYE: OD_EXAM: NORMAL

## 2022-03-24 ASSESSMENT — REFRACTION_MANIFEST
OS_SPHERE: +1.50
OS_CYLINDER: +0.25
OD_ADD: +2.50
OS_AXIS: 120
OD_AXIS: 077
OD_SPHERE: +1.75
OD_CYLINDER: +0.25
OS_ADD: +2.50

## 2022-03-24 ASSESSMENT — CUP TO DISC RATIO
OD_RATIO: 0.5
OS_RATIO: 0.4

## 2022-03-24 ASSESSMENT — VISUAL ACUITY
OS_CC: 20/30
OS_CC+: -1
OD_CC: 20/20
METHOD: SNELLEN - LINEAR
CORRECTION_TYPE: GLASSES
OD_CC+: -1

## 2022-03-24 ASSESSMENT — CONF VISUAL FIELD
METHOD: COUNTING FINGERS
OS_NORMAL: 1
OD_NORMAL: 1

## 2022-03-24 NOTE — PROGRESS NOTES
History  HPI     COMPREHENSIVE EYE EXAM     In both eyes.  Associated symptoms include floaters (in the LE, noted last exam, pt notes no changes, hasn't gotten bigger or darker. ).  Negative for flashes, dryness, tearing and eye pain.  Treatments tried include no treatments.  Pain was noted as 0/10.              Comments     Patient notes that she needs new gls RX, her current gls have a crack in the left lens, otherwise notes that she has no additional concerns.     Pt is using AT [facility]    Kristen Kathi COT March 24, 2022 7:47 AM            Last edited by Sheela Armenta on 3/24/2022  7:48 AM. (History)          Assessment/Plan  (H52.03) Hypermetropia of both eyes  (primary encounter diagnosis)  Comment: Hyperopic astigmatism with presbyopia both eyes  Plan: REFRACTION         Educated patient on condition and clinical findings. Dispensed spectacle prescription for full time wear. Monitor annually.    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 Monitor annually.    (H11.002) Pterygium, left  Comment: Asymptoatmic  Plan:  No treatment indicated at this time. Monitor annually.    (D23.10) Papilloma of eyelid, right  Comment: Asymptomatic  Plan:  No treatment indicated at this time. Monitor annually.    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 Monitor annually.    Return to clinic in 1 year for comprehensive eye exam.    Complete documentation of historical and exam elements from today's encounter can  be found in the full encounter summary report (not reduplicated in this progress  note). I personally obtained the chief complaint(s) and history of present illness. I  confirmed and edited as necessary the review of systems, past medical/surgical  history, family history, social history, and examination  findings as documented by  others; and I examined the patient myself. I personally reviewed the relevant tests,  images, and reports as documented above. I formulated and edited as necessary the  assessment and plan and discussed the findings and management plan with the  patient and family.    Jasen Lee OD, FAAO

## 2022-03-24 NOTE — NURSING NOTE
Chief Complaints and History of Present Illnesses   Patient presents with     COMPREHENSIVE EYE EXAM     Chief Complaint(s) and History of Present Illness(es)     COMPREHENSIVE EYE EXAM     Laterality: both eyes    Associated symptoms: floaters (in the LE, noted last exam, pt notes no changes, hasn't gotten bigger or darker. ).  Negative for flashes, dryness, tearing and eye pain    Treatments tried: no treatments    Pain scale: 0/10              Comments     Patient notes that she needs new gls RX, her current gls have a crack in the left lens, otherwise notes that she has no additional concerns.     Pt is using AT [facility] March 24, 2022 7:47 AM

## 2022-08-10 ENCOUNTER — LAB (OUTPATIENT)
Dept: LAB | Facility: CLINIC | Age: 72
End: 2022-08-10

## 2022-08-10 ENCOUNTER — TELEPHONE (OUTPATIENT)
Dept: ENDOCRINOLOGY | Facility: CLINIC | Age: 72
End: 2022-08-10

## 2022-08-10 ENCOUNTER — OFFICE VISIT (OUTPATIENT)
Dept: ENDOCRINOLOGY | Facility: CLINIC | Age: 72
End: 2022-08-10
Payer: COMMERCIAL

## 2022-08-10 VITALS
SYSTOLIC BLOOD PRESSURE: 147 MMHG | BODY MASS INDEX: 16.01 KG/M2 | HEART RATE: 61 BPM | OXYGEN SATURATION: 99 % | DIASTOLIC BLOOD PRESSURE: 88 MMHG | WEIGHT: 82 LBS

## 2022-08-10 DIAGNOSIS — E04.2 NON-TOXIC MULTINODULAR GOITER: ICD-10-CM

## 2022-08-10 DIAGNOSIS — M81.0 OSTEOPOROSIS: ICD-10-CM

## 2022-08-10 DIAGNOSIS — E04.1 THYROID NODULE: ICD-10-CM

## 2022-08-10 DIAGNOSIS — M81.0 AGE-RELATED OSTEOPOROSIS WITHOUT CURRENT PATHOLOGICAL FRACTURE: Primary | ICD-10-CM

## 2022-08-10 DIAGNOSIS — E04.2 NON-TOXIC MULTINODULAR GOITER: Primary | ICD-10-CM

## 2022-08-10 LAB
ANION GAP SERPL CALCULATED.3IONS-SCNC: 8 MMOL/L (ref 3–14)
BUN SERPL-MCNC: 22 MG/DL (ref 7–30)
CALCIUM SERPL-MCNC: 9.5 MG/DL (ref 8.5–10.1)
CHLORIDE BLD-SCNC: 108 MMOL/L (ref 94–109)
CO2 SERPL-SCNC: 27 MMOL/L (ref 20–32)
CREAT SERPL-MCNC: 0.86 MG/DL (ref 0.52–1.04)
GFR SERPL CREATININE-BSD FRML MDRD: 73 ML/MIN/1.73M2
GLUCOSE BLD-MCNC: 92 MG/DL (ref 70–99)
POTASSIUM BLD-SCNC: 3.7 MMOL/L (ref 3.4–5.3)
SODIUM SERPL-SCNC: 143 MMOL/L (ref 133–144)
T4 FREE SERPL-MCNC: 1.05 NG/DL (ref 0.76–1.46)
TSH SERPL DL<=0.005 MIU/L-ACNC: 0.5 MU/L (ref 0.4–4)

## 2022-08-10 PROCEDURE — 36415 COLL VENOUS BLD VENIPUNCTURE: CPT | Performed by: PATHOLOGY

## 2022-08-10 PROCEDURE — 84439 ASSAY OF FREE THYROXINE: CPT | Performed by: PATHOLOGY

## 2022-08-10 PROCEDURE — 84443 ASSAY THYROID STIM HORMONE: CPT | Performed by: PATHOLOGY

## 2022-08-10 PROCEDURE — 99000 SPECIMEN HANDLING OFFICE-LAB: CPT | Performed by: PATHOLOGY

## 2022-08-10 PROCEDURE — 80048 BASIC METABOLIC PNL TOTAL CA: CPT | Performed by: PATHOLOGY

## 2022-08-10 PROCEDURE — 82306 VITAMIN D 25 HYDROXY: CPT | Mod: 90 | Performed by: PATHOLOGY

## 2022-08-10 PROCEDURE — 99203 OFFICE O/P NEW LOW 30 MIN: CPT | Mod: GC | Performed by: INTERNAL MEDICINE

## 2022-08-10 ASSESSMENT — PAIN SCALES - GENERAL: PAINLEVEL: NO PAIN (0)

## 2022-08-10 NOTE — PROGRESS NOTES
Endocrinology Follow-up Clinic Note    Talon SILVA Son MRN:[id number] YOB: 1950  Primary care provider: Vesta Foss     Reason for visit:    Brief summary:   70 yo female with PMH for osteoporosis (diagnosed with dexa 2017) on calcium/vitamin D, history of multinodular goiter (negative biopsies 2012, 2014) who presents today for follow up.    Osteoporosis was diagnosed 4/2017 with DEXA scan noted above. Has been taking citracal petites (2 pills in the morning) for the past year. Was recommended to start IV reclast and continue fosamax but patient reported she did not want to do either and was encouraged to continue with her calcium and vit D supplementation with  Weight bearing exercises.     Last DEXA in 2019  Conclusions:  The most negative and valid T-score of -3.4 at the level of the L1 - L3 lumbar spine corresponds with osteoporosis according to WHO criteria for postmenopausal females and men age 50 and over.     For her multinodular goiter, biopsies in 2012 and 2014 (both with nodules from right and left side sampled) - all negative. No repeat thyroid labs since last visit in 2019 when they were WNL. Last US in 2018 looks stable when compared to prior, plan was to follow up with repeat US in 2020 though this was not done.     Interval history:  Patient reports feeling ok today and in general. Denies heat/cold intolerenace, no changes in voice, reports normal appetite and eating ok. No nausea/vomiting, no palpitations, no shortness of breath. No swelling or difficulty swallowing.     For her bone health she is still taking her Vitamin D and calcium, taken every day. She does a lot of standing at her job but not a lot of exercising.     Review Of Systems  12 point ROS negative unless noted above    Past Medical History  Past Medical History:   Diagnosis Date     Thyroid disease      Vertigo        Past Surgical History  Past Surgical History:   Procedure Laterality Date     APPENDECTOMY   09/17/12     COLONOSCOPY  10/2007    repeat 10 yrs     COLONOSCOPY  6/2014    repeat 10 yrs        Medications  Current Outpatient Medications   Medication Sig Dispense Refill     alendronate (FOSAMAX) 70 MG tablet Take 1 tablet (70 mg) by mouth every 7 days Take 60 minutes before am meal with 8 oz. water. Remain upright for 30 minutes. 12 tablet 3     Calcium Citrate-Vitamin D (CALCIUM CITRATE PETITE/VIT D PO)        emollient (VANICREAM) external cream Apply topically as needed for other (dry skin) 453 g 1     multivitamin, therapeutic with minerals (MULTI-VITAMIN) TABS Take 1 tablet by mouth daily       psyllium (METAMUCIL SMOOTH TEXTURE) 28 % packet Take one packet in 8 ounces of water daily 90 each 3     VITAMIN D, CHOLECALCIFEROL, PO Take 1,000 Units by mouth daily         Physical Examination:  General appearance: seated comfortably in the chair during assessment. Not in any acute distress  HEENT: PEERLA. Oral cavity is moist and clear. No palpable thyroid/neck abnormalities,  Lungs: bilateral air entry equal. Clear to auscultation. No rhonchi or crepitations heard  Heart: S1 S2 normal. Pulse: regular rate and rhythm, good volume  Abdomen: soft, nontender, nondistended  Neurological: conscious and oriented. Speech: normal. Moving all four extremities equally  Extremities: no edema noted. Dorsalis pedis 2+ bilaterally. No ulcers noted. No tremor is noted over her outstretched hands  Psychiatric: normal mood and affect. Normal judgment             Assessment and Plan:   Ms. Son is a 72 yo female with PMH for osteoporosis (diagnosed with dexa 2017) on calcium/vitamin D, history of multinodular goiter (negative biopsies 2012, 2014) who presents today for follow up.    #Osteoporosis   Diagnosed 4/2017 with DEXA scan, repeat DEXA as noted above. Has been taking citracal petites (2 pills in the morning) for the past year. Was recommended to start IV reclast last year as well as fosamax but was not interested. Highly  recommend repeat imaging and labs to re-evaluate her osteopoporosis as do not believe Calcium and vitamin D are enough to handle her osteoporossis. That being said if DEXA unchanged can leave as is though if worse would recommend fosamax     - repeat DEXA  - labs including Vit. D, BMP  - continue citracal 2 tabs and Vit. D supplementation  - will consider further treatment pending results    #Multinodular Goiter  Biopsies in 2012 and 2014 (both with nodules from right and left side sampled) - all negative. Patient denies any significant changes in her neck  And no clinical changes since last visit. Thyroid function normal last time checked in 2018.  Ultrasound in 2018 looked stable when compared to prior though did not get follow up that was recommended.      - TSH, free T4  - repeat thyroid US to evaluate for any changes in nodules     Return to clinic as needed pending studies above, but at least follow up yearly with endocrinology.     The patient was seen, examined and discussed with Dr. Gokul MD, staff endocrinologist    Malachi Mackey MD  [facility]  Internal Medicine PGY-2    Attending tie-in note  I saw the patient with resident Dr. Mackey and directly examined patient and discussed. Agree above note and plan.       35 minutes spent on the date of the encounter doing chart review, history and exam, documentation and further activities as noted above.    Linda Hodgson MD  Staff Physician  Endocrinology and Metabolism  [facility]  License: [id number]  Pager: [phone]

## 2022-08-10 NOTE — LETTER
8/10/2022       RE: Talon SILVA Son  [address]  [place] [address]     Dear Colleague,    Thank you for referring your patient, Talon SILVA Son, to the [facility] [place] [facility]. Please see a copy of my visit note below.    Endocrinology Follow-up Clinic Note    Talon Myers MRN:[id number] YOB: 1950  Primary care provider: Vesta Foss     Reason for visit:    Brief summary:   70 yo female with PMH for osteoporosis (diagnosed with dexa 2017) on calcium/vitamin D, history of multinodular goiter (negative biopsies 2012, 2014) who presents today for follow up.    Osteoporosis was diagnosed 4/2017 with DEXA scan noted above. Has been taking citracal petites (2 pills in the morning) for the past year. Was recommended to start IV reclast and continue fosamax but patient reported she did not want to do either and was encouraged to continue with her calcium and vit D supplementation with  Weight bearing exercises.     Last DEXA in 2019  Conclusions:  The most negative and valid T-score of -3.4 at the level of the L1 - L3 lumbar spine corresponds with osteoporosis according to WHO criteria for postmenopausal females and men age 50 and over.     For her multinodular goiter, biopsies in 2012 and 2014 (both with nodules from right and left side sampled) - all negative. No repeat thyroid labs since last visit in 2019 when they were WNL. Last US in 2018 looks stable when compared to prior, plan was to follow up with repeat US in 2020 though this was not done.     Interval history:  Patient reports feeling ok today and in general. Denies heat/cold intolerenace, no changes in voice, reports normal appetite and eating ok. No nausea/vomiting, no palpitations, no shortness of breath. No swelling or difficulty swallowing.     For her bone health she is still taking her Vitamin D and calcium, taken every day. She does a  lot of standing at her job but not a lot of exercising.     Review Of Systems  12 point ROS negative unless noted above    Past Medical History  Past Medical History:   Diagnosis Date     Thyroid disease      Vertigo        Past Surgical History  Past Surgical History:   Procedure Laterality Date     APPENDECTOMY  09/17/12     COLONOSCOPY  10/2007    repeat 10 yrs     COLONOSCOPY  6/2014    repeat 10 yrs        Medications  Current Outpatient Medications   Medication Sig Dispense Refill     alendronate (FOSAMAX) 70 MG tablet Take 1 tablet (70 mg) by mouth every 7 days Take 60 minutes before am meal with 8 oz. water. Remain upright for 30 minutes. 12 tablet 3     Calcium Citrate-Vitamin D (CALCIUM CITRATE PETITE/VIT D PO)        emollient (VANICREAM) external cream Apply topically as needed for other (dry skin) 453 g 1     multivitamin, therapeutic with minerals (MULTI-VITAMIN) TABS Take 1 tablet by mouth daily       psyllium (METAMUCIL SMOOTH TEXTURE) 28 % packet Take one packet in 8 ounces of water daily 90 each 3     VITAMIN D, CHOLECALCIFEROL, PO Take 1,000 Units by mouth daily         Physical Examination:  General appearance: seated comfortably in the chair during assessment. Not in any acute distress  HEENT: PEERLA. Oral cavity is moist and clear. No palpable thyroid/neck abnormalities,  Lungs: bilateral air entry equal. Clear to auscultation. No rhonchi or crepitations heard  Heart: S1 S2 normal. Pulse: regular rate and rhythm, good volume  Abdomen: soft, nontender, nondistended  Neurological: conscious and oriented. Speech: normal. Moving all four extremities equally  Extremities: no edema noted. Dorsalis pedis 2+ bilaterally. No ulcers noted. No tremor is noted over her outstretched hands  Psychiatric: normal mood and affect. Normal judgment             Assessment and Plan:   Ms. Son is a 72 yo female with PMH for osteoporosis (diagnosed with dexa 2017) on calcium/vitamin D, history of multinodular goiter  (negative biopsies 2012, 2014) who presents today for follow up.    #Osteoporosis   Diagnosed 4/2017 with DEXA scan, repeat DEXA as noted above. Has been taking citracal petites (2 pills in the morning) for the past year. Was recommended to start IV reclast last year as well as fosamax but was not interested. Highly recommend repeat imaging and labs to re-evaluate her osteopoporosis as do not believe Calcium and vitamin D are enough to handle her osteoporossis. That being said if DEXA unchanged can leave as is though if worse would recommend fosamax     - repeat DEXA  - labs including Vit. D, BMP  - continue citracal 2 tabs and Vit. D supplementation  - will consider further treatment pending results    #Multinodular Goiter  Biopsies in 2012 and 2014 (both with nodules from right and left side sampled) - all negative. Patient denies any significant changes in her neck  And no clinical changes since last visit. Thyroid function normal last time checked in 2018.  Ultrasound in 2018 looked stable when compared to prior though did not get follow up that was recommended.      - TSH, free T4  - repeat thyroid US to evaluate for any changes in nodules     Return to clinic as needed pending studies above, but at least follow up yearly with endocrinology.     The patient was seen, examined and discussed with Dr. Gokul MD, staff endocrinologist    Malachi Mackey MD  [facility]  Internal Medicine PGY-2    Attending tie-in note  I saw the patient with resident Dr. Mackey and directly examined patient and discussed. Agree above note and plan.       35 minutes spent on the date of the encounter doing chart review, history and exam, documentation and further activities as noted above.    Linda Hodgson MD  Staff Physician  Endocrinology and Metabolism  [facility]  License: [id number]  Pager: [phone]

## 2022-08-10 NOTE — NURSING NOTE
Chief Complaint   Patient presents with     Endocrine Problem     Thyroid     Blood pressure (!) 147/88, pulse 61, weight 37.2 kg (82 lb), SpO2 99 %, not currently breastfeeding.    Sonia Dang

## 2022-08-10 NOTE — TELEPHONE ENCOUNTER
Lab calls: Pt at lab for draw. Lab orders placed per notes.   Provider notified.   Makenna Flynn RN on 8/10/2022 at 11:10 AM

## 2022-08-11 ENCOUNTER — ANCILLARY PROCEDURE (OUTPATIENT)
Dept: BONE DENSITY | Facility: CLINIC | Age: 72
End: 2022-08-11
Attending: INTERNAL MEDICINE
Payer: COMMERCIAL

## 2022-08-11 ENCOUNTER — ANCILLARY PROCEDURE (OUTPATIENT)
Dept: ULTRASOUND IMAGING | Facility: CLINIC | Age: 72
End: 2022-08-11
Attending: INTERNAL MEDICINE
Payer: COMMERCIAL

## 2022-08-11 DIAGNOSIS — M81.0 AGE-RELATED OSTEOPOROSIS WITHOUT CURRENT PATHOLOGICAL FRACTURE: ICD-10-CM

## 2022-08-11 DIAGNOSIS — E04.1 THYROID NODULE: ICD-10-CM

## 2022-08-11 LAB — DEPRECATED CALCIDIOL+CALCIFEROL SERPL-MC: 46 UG/L (ref 20–75)

## 2022-08-11 PROCEDURE — 77080 DXA BONE DENSITY AXIAL: CPT | Performed by: INTERNAL MEDICINE

## 2022-08-11 PROCEDURE — 76536 US EXAM OF HEAD AND NECK: CPT | Mod: GC | Performed by: RADIOLOGY

## 2022-10-10 ENCOUNTER — HEALTH MAINTENANCE LETTER (OUTPATIENT)
Age: 72
End: 2022-10-10

## 2023-02-16 ENCOUNTER — ANCILLARY PROCEDURE (OUTPATIENT)
Dept: MAMMOGRAPHY | Facility: CLINIC | Age: 73
End: 2023-02-16
Attending: NURSE PRACTITIONER
Payer: COMMERCIAL

## 2023-02-16 DIAGNOSIS — Z12.31 VISIT FOR SCREENING MAMMOGRAM: ICD-10-CM

## 2023-02-16 PROCEDURE — 77063 BREAST TOMOSYNTHESIS BI: CPT | Performed by: RADIOLOGY

## 2023-02-16 PROCEDURE — 77067 SCR MAMMO BI INCL CAD: CPT | Performed by: RADIOLOGY

## 2023-04-05 ENCOUNTER — OFFICE VISIT (OUTPATIENT)
Dept: OPHTHALMOLOGY | Facility: CLINIC | Age: 73
End: 2023-04-05
Payer: COMMERCIAL

## 2023-04-05 DIAGNOSIS — H02.831 DERMATOCHALASIS OF BOTH UPPER EYELIDS: ICD-10-CM

## 2023-04-05 DIAGNOSIS — H35.363 RETINAL DRUSEN OF BOTH EYES: ICD-10-CM

## 2023-04-05 DIAGNOSIS — H52.03 HYPERMETROPIA OF BOTH EYES: Primary | ICD-10-CM

## 2023-04-05 DIAGNOSIS — H11.002 PTERYGIUM, LEFT: ICD-10-CM

## 2023-04-05 DIAGNOSIS — H25.813 COMBINED FORMS OF AGE-RELATED CATARACT OF BOTH EYES: ICD-10-CM

## 2023-04-05 DIAGNOSIS — H02.834 DERMATOCHALASIS OF BOTH UPPER EYELIDS: ICD-10-CM

## 2023-04-05 PROCEDURE — 92014 COMPRE OPH EXAM EST PT 1/>: CPT | Performed by: OPTOMETRIST

## 2023-04-05 PROCEDURE — 92015 DETERMINE REFRACTIVE STATE: CPT | Performed by: OPTOMETRIST

## 2023-04-05 ASSESSMENT — VISUAL ACUITY
METHOD: SNELLEN - LINEAR
OS_CC+: -3
OD_CC+: +2
OD_CC: 20/60-
OD_CC: 20/40
OS_CC: 20/30-
OS_CC: 20/25

## 2023-04-05 ASSESSMENT — REFRACTION_MANIFEST
OS_CYLINDER: +0.25
OD_CYLINDER: +0.25
OS_ADD: +2.50
OS_AXIS: 120
OD_ADD: +2.50
OS_SPHERE: +1.75
OD_SPHERE: +2.00
OD_AXIS: 060

## 2023-04-05 ASSESSMENT — CONF VISUAL FIELD
OD_SUPERIOR_NASAL_RESTRICTION: 0
OD_INFERIOR_NASAL_RESTRICTION: 0
OD_INFERIOR_TEMPORAL_RESTRICTION: 0
OS_INFERIOR_NASAL_RESTRICTION: 0
OD_NORMAL: 1
OS_NORMAL: 1
OD_SUPERIOR_TEMPORAL_RESTRICTION: 0
OS_INFERIOR_TEMPORAL_RESTRICTION: 0
OS_SUPERIOR_TEMPORAL_RESTRICTION: 0
OS_SUPERIOR_NASAL_RESTRICTION: 0

## 2023-04-05 ASSESSMENT — SLIT LAMP EXAM - LIDS
COMMENTS: DERMATOCHALASIS
COMMENTS: DERMATOCHALASIS

## 2023-04-05 ASSESSMENT — TONOMETRY
OS_IOP_MMHG: 15
OD_IOP_MMHG: 19
IOP_METHOD: TONOPEN

## 2023-04-05 ASSESSMENT — CUP TO DISC RATIO
OS_RATIO: 0.4
OD_RATIO: 0.5

## 2023-04-05 ASSESSMENT — EXTERNAL EXAM - LEFT EYE: OS_EXAM: NORMAL

## 2023-04-05 ASSESSMENT — EXTERNAL EXAM - RIGHT EYE: OD_EXAM: NORMAL

## 2023-04-05 NOTE — PROGRESS NOTES
History  HPI     Annual Eye Exam    In both eyes.  Charactertized as  blurred vision.  Context:  near vision.  Associated symptoms include Negative for dryness, eye pain, flashes and floaters. Additional comments: The patient reports very short term soreness upon awakening which is relieved with blinking.  She reports that she is not seeing well in her new glasses. She believes that this is due to the  not measuring the bifocal well.          Last edited by Jasen Lee, OD on 4/5/2023 10:33 AM.          Assessment/Plan  (H52.03) Hypermetropia of both eyes  (primary encounter diagnosis)  Comment: Hyperopic astigmatism both eyes with presbyopia  Plan: REFRACTION         Educated patient on condition and clinical findings. Dispensed spectacle prescription for full time wear. Monitor annually.    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 The patient is not interested in surgery at this time. Monitor annually.    (H11.002) Pterygium, left  Comment: Asymptomatic  Plan:  Monitor annually.    (H35.363) Retinal drusen of both eyes  Comment: Not visually significant  Plan:  Stable. Monitor annually.    (H02.831,  H02.834) Dermatochalasis of both upper eyelids  Comment: Asymptomatic  Plan:  No treatment indicated at this time. Monitor annually.    Return to clinic in 1 year for comprehensive eye exam.    Complete documentation of historical and exam elements from today's encounter can  be found in the full encounter summary report (not reduplicated in this progress  note). I personally obtained the chief complaint(s) and history of present illness. I  confirmed and edited as necessary the review of systems, past medical/surgical  history, family history, social history, and examination findings as documented by  others; and I examined the patient myself. I personally reviewed the relevant  tests,  images, and reports as documented above. I formulated and edited as necessary the  assessment and plan and discussed the findings and management plan with the  patient and family.    Jasen Lee OD, FAAO

## 2023-04-20 ENCOUNTER — PATIENT OUTREACH (OUTPATIENT)
Dept: CARE COORDINATION | Facility: CLINIC | Age: 73
End: 2023-04-20
Payer: COMMERCIAL

## 2023-05-27 ENCOUNTER — HEALTH MAINTENANCE LETTER (OUTPATIENT)
Age: 73
End: 2023-05-27

## 2023-06-16 ASSESSMENT — ENCOUNTER SYMPTOMS
DYSURIA: 0
PALPITATIONS: 0
DIARRHEA: 0
HEMATOCHEZIA: 0
HEMATURIA: 0
NAUSEA: 0
DIZZINESS: 0
SORE THROAT: 0
ARTHRALGIAS: 0
ABDOMINAL PAIN: 0
FREQUENCY: 0
WEAKNESS: 0
PARESTHESIAS: 0
CHILLS: 0
MYALGIAS: 0
FEVER: 0
HEARTBURN: 0
CONSTIPATION: 0
BREAST MASS: 0
SHORTNESS OF BREATH: 0
NERVOUS/ANXIOUS: 0
EYE PAIN: 0
HEADACHES: 0
JOINT SWELLING: 0
COUGH: 0

## 2023-06-16 ASSESSMENT — ACTIVITIES OF DAILY LIVING (ADL): CURRENT_FUNCTION: NO ASSISTANCE NEEDED

## 2023-06-23 ENCOUNTER — OFFICE VISIT (OUTPATIENT)
Dept: FAMILY MEDICINE | Facility: CLINIC | Age: 73
End: 2023-06-23
Payer: COMMERCIAL

## 2023-06-23 VITALS
TEMPERATURE: 98.8 F | DIASTOLIC BLOOD PRESSURE: 85 MMHG | BODY MASS INDEX: 16.9 KG/M2 | OXYGEN SATURATION: 100 % | WEIGHT: 80.5 LBS | HEART RATE: 57 BPM | HEIGHT: 58 IN | SYSTOLIC BLOOD PRESSURE: 133 MMHG | RESPIRATION RATE: 14 BRPM

## 2023-06-23 DIAGNOSIS — M19.041 ARTHRITIS OF BOTH HANDS: ICD-10-CM

## 2023-06-23 DIAGNOSIS — M81.0 AGE-RELATED OSTEOPOROSIS WITHOUT CURRENT PATHOLOGICAL FRACTURE: ICD-10-CM

## 2023-06-23 DIAGNOSIS — E04.2 NON-TOXIC MULTINODULAR GOITER: ICD-10-CM

## 2023-06-23 DIAGNOSIS — Z00.00 ROUTINE GENERAL MEDICAL EXAMINATION AT A HEALTH CARE FACILITY: Primary | ICD-10-CM

## 2023-06-23 DIAGNOSIS — M19.042 ARTHRITIS OF BOTH HANDS: ICD-10-CM

## 2023-06-23 DIAGNOSIS — Z13.6 CARDIOVASCULAR SCREENING; LDL GOAL LESS THAN 130: ICD-10-CM

## 2023-06-23 PROBLEM — L85.3 DRY SKIN: Status: RESOLVED | Noted: 2022-03-22 | Resolved: 2023-06-23

## 2023-06-23 LAB
ANION GAP SERPL CALCULATED.3IONS-SCNC: 8 MMOL/L (ref 7–15)
BUN SERPL-MCNC: 23.1 MG/DL (ref 8–23)
CALCIUM SERPL-MCNC: 9.8 MG/DL (ref 8.8–10.2)
CHLORIDE SERPL-SCNC: 104 MMOL/L (ref 98–107)
CREAT SERPL-MCNC: 0.83 MG/DL (ref 0.51–0.95)
DEPRECATED HCO3 PLAS-SCNC: 27 MMOL/L (ref 22–29)
ERYTHROCYTE [DISTWIDTH] IN BLOOD BY AUTOMATED COUNT: 12 % (ref 10–15)
GFR SERPL CREATININE-BSD FRML MDRD: 75 ML/MIN/1.73M2
GLUCOSE SERPL-MCNC: 89 MG/DL (ref 70–99)
HCT VFR BLD AUTO: 34.7 % (ref 35–47)
HGB BLD-MCNC: 11.6 G/DL (ref 11.7–15.7)
MCH RBC QN AUTO: 33.1 PG (ref 26.5–33)
MCHC RBC AUTO-ENTMCNC: 33.4 G/DL (ref 31.5–36.5)
MCV RBC AUTO: 99 FL (ref 78–100)
PLATELET # BLD AUTO: 184 10E3/UL (ref 150–450)
POTASSIUM SERPL-SCNC: 4.3 MMOL/L (ref 3.4–5.3)
RBC # BLD AUTO: 3.5 10E6/UL (ref 3.8–5.2)
SODIUM SERPL-SCNC: 139 MMOL/L (ref 136–145)
WBC # BLD AUTO: 3.4 10E3/UL (ref 4–11)

## 2023-06-23 PROCEDURE — 99397 PER PM REEVAL EST PAT 65+ YR: CPT | Performed by: FAMILY MEDICINE

## 2023-06-23 PROCEDURE — 80048 BASIC METABOLIC PNL TOTAL CA: CPT | Performed by: FAMILY MEDICINE

## 2023-06-23 PROCEDURE — 36415 COLL VENOUS BLD VENIPUNCTURE: CPT | Performed by: FAMILY MEDICINE

## 2023-06-23 PROCEDURE — 85027 COMPLETE CBC AUTOMATED: CPT | Performed by: FAMILY MEDICINE

## 2023-06-23 ASSESSMENT — ENCOUNTER SYMPTOMS
SORE THROAT: 0
SHORTNESS OF BREATH: 0
NAUSEA: 0
HEARTBURN: 0
CONSTIPATION: 0
PALPITATIONS: 0
CHILLS: 0
ARTHRALGIAS: 0
COUGH: 0
DIZZINESS: 0
DIARRHEA: 0
PARESTHESIAS: 0
DYSURIA: 0
HEMATURIA: 0
HEMATOCHEZIA: 0
FEVER: 0
JOINT SWELLING: 0
BREAST MASS: 0
MYALGIAS: 0
ABDOMINAL PAIN: 0
HEADACHES: 0
EYE PAIN: 0
NERVOUS/ANXIOUS: 0
FREQUENCY: 0
WEAKNESS: 0

## 2023-06-23 ASSESSMENT — ACTIVITIES OF DAILY LIVING (ADL): CURRENT_FUNCTION: NO ASSISTANCE NEEDED

## 2023-06-23 NOTE — PROGRESS NOTES
"SUBJECTIVE:   Talon is a 72 year old who presents for Preventive Visit.        6/23/2023     8:01 AM   Additional Questions   Roomed by Blaire     Are you in the first 12 months of your Medicare coverage?  No    Healthy Habits:    In general, how would you rate your overall health?  Good    Frequency of exercise:  None    Do you usually eat at least 4 servings of fruit and vegetables a day, include whole grains    & fiber and avoid regularly eating high fat or \"junk\" foods?  No    Taking medications regularly:  Yes    Medication side effects:  Not applicable    Ability to successfully perform activities of daily living:  No assistance needed    Home Safety:  No safety concerns identified    Hearing Impairment:  No hearing concerns    In the past 6 months, have you been bothered by leaking of urine?  No    In general, how would you rate your overall mental or emotional health?  Good      PHQ-2 Total Score:    Additional concerns today:  No        Have you ever done Advance Care Planning? (For example, a Health Directive, POLST, or a discussion with a medical provider or your loved ones about your wishes): No, advance care planning information given to patient to review.  Patient plans to discuss their wishes with loved ones or provider.       Fall risk  Fallen 2 or more times in the past year?: No  Any fall with injury in the past year?: No  click delete button to remove this line now  Cognitive Screening   1) Repeat 3 items (Leader, Season, Table)    2) Clock draw: NORMAL  3) 3 item recall: Recalls 2 objects   Results: NORMAL clock, 1-2 items recalled: COGNITIVE IMPAIRMENT LESS LIKELY    Mini-CogTM Copyright NICK Flaherty. Licensed by the author for use in [facility]; reprinted with permission (suleiman@.[facility]). All rights reserved.      Do you have sleep apnea, excessive snoring or daytime drowsiness?: no    Reviewed and updated as needed this visit by clinical staff   Tobacco  Allergies  Meds          "     Reviewed and updated as needed this visit by Provider                 Social History     Tobacco Use     Smoking status: Never     Smokeless tobacco: Never   Substance Use Topics     Alcohol use: No           6/16/2023    11:49 AM   Alcohol Use   Prescreen: >3 drinks/day or >7 drinks/week? Not Applicable          No data to display              Do you have a current opioid prescription? No  Do you use any other controlled substances or medications that are not prescribed by a provider? None    Osteoarthritis of hands-   Unchanged    Multinodular goiter Follow-up-   Appointment with endo in Aug    Since last visit, patient describes the following symptoms: Weight stable, no hair loss, no skin changes, no constipation, no loose stools    Osteoporosis followup   Will see endo in Aug    Current providers sharing in care for this patient include:     Patient Care Team:  Vesta Foss APRN CNP as PCP - General (Nurse Practitioner - Family)  Jasen Lee OD as Assigned Surgical Provider  Vesta Foss APRN CNP as Assigned PCP  Linda Hodgson MD as Assigned Endocrinology Provider    The following health maintenance items are reviewed in Epic and correct as of today:  Health Maintenance   Topic Date Due     ANNUAL REVIEW OF HM ORDERS  Never done     COVID-19 Vaccine (4 - Pfizer series) 12/01/2021     MEDICARE ANNUAL WELLNESS VISIT  03/22/2023     COLORECTAL CANCER SCREENING  06/03/2024     FALL RISK ASSESSMENT  06/23/2024     MAMMO SCREENING  02/16/2025     ADVANCE CARE PLANNING  12/11/2025     LIPID  03/22/2027     DTAP/TDAP/TD IMMUNIZATION (3 - Td or Tdap) 03/22/2032     DEXA  08/11/2037     HEPATITIS C SCREENING  Completed     PHQ-2 (once per calendar year)  Completed     INFLUENZA VACCINE  Completed     Pneumococcal Vaccine: 65+ Years  Completed     ZOSTER IMMUNIZATION  Completed     IPV IMMUNIZATION  Aged Out     MENINGITIS IMMUNIZATION  Aged Out     Lab work is in process  Labs reviewed in  "EPIC    Review of Systems   Constitutional: Negative for chills and fever.   HENT: Negative for congestion, ear pain, hearing loss and sore throat.    Eyes: Negative for pain and visual disturbance.   Respiratory: Negative for cough and shortness of breath.    Cardiovascular: Negative for chest pain, palpitations and peripheral edema.   Gastrointestinal: Negative for abdominal pain, constipation, diarrhea, heartburn, hematochezia and nausea.   Breasts:  Negative for tenderness, breast mass and discharge.   Genitourinary: Negative for dysuria, frequency, genital sores, hematuria, pelvic pain, urgency, vaginal bleeding and vaginal discharge.   Musculoskeletal: Negative for arthralgias, joint swelling and myalgias.   Skin: Negative for rash.   Neurological: Negative for dizziness, weakness, headaches and paresthesias.   Psychiatric/Behavioral: Negative for mood changes. The patient is not nervous/anxious.      OBJECTIVE:   /85 (BP Location: Right arm, Patient Position: Sitting, Cuff Size: Adult Small)   Pulse 57   Temp 98.8  F (37.1  C) (Temporal)   Resp 14   Ht 1.477 m (4' 10.15\")   Wt 36.5 kg (80 lb 8 oz)   LMP  (LMP Unknown)   SpO2 100%   BMI 16.74 kg/m   Estimated body mass index is 16.74 kg/m  as calculated from the following:    Height as of this encounter: 1.477 m (4' 10.15\").    Weight as of this encounter: 36.5 kg (80 lb 8 oz).  Physical Exam  GENERAL APPEARANCE: healthy, alert and no distress  EYES: Eyes grossly normal to inspection, PERRL and conjunctivae and sclerae normal  HENT: ear canals and TM's normal, nose and mouth without ulcers or lesions, oropharynx clear and oral mucous membranes moist  NECK: no adenopathy, no asymmetry, masses, or scars and thyroid normal to palpation  RESP: lungs clear to auscultation - no rales, rhonchi or wheezes  CV: regular rate and rhythm, normal S1 S2, no S3 or S4, no murmur, click or rub, no peripheral edema and peripheral pulses strong  ABDOMEN: soft, " nontender, no hepatosplenomegaly, no masses and bowel sounds normal  MS: no musculoskeletal defects are noted and gait is age appropriate without ataxia  SKIN: no suspicious lesions or rashes  NEURO: Normal strength and tone, sensory exam grossly normal, mentation intact and speech normal  PSYCH: mentation appears normal and affect normal/bright    Diagnostic Test Results:  Labs reviewed in Epic    ASSESSMENT / PLAN:       ICD-10-CM    1. Routine general medical examination at a health care facility  Z00.00 REVIEW OF HEALTH MAINTENANCE PROTOCOL ORDERS     CBC with platelets     Basic metabolic panel  (Ca, Cl, CO2, Creat, Gluc, K, Na, BUN)     CBC with platelets     Basic metabolic panel  (Ca, Cl, CO2, Creat, Gluc, K, Na, BUN)      2. Arthritis of both hands  M19.041     M19.042       3. Non-toxic multinodular goiter  E04.2       4. Age-related osteoporosis without current pathological fracture  M81.0       5. CARDIOVASCULAR SCREENING; LDL GOAL LESS THAN 130  Z13.6 Lipid panel reflex to direct LDL Fasting        COUNSELING:  Reviewed preventive health counseling, as reflected in patient instructions       Regular exercise       Healthy diet/nutrition       Vision screening       Colon cancer screening        She reports that she has never smoked. She has never used smokeless tobacco.      Appropriate preventive services were discussed with this patient, including applicable screening as appropriate for cardiovascular disease, diabetes, osteopenia/osteoporosis, and glaucoma.  As appropriate for age/gender, discussed screening for colorectal cancer, prostate cancer, breast cancer, and cervical cancer. Checklist reviewing preventive services available has been given to the patient.    Reviewed patients plan of care and provided an AVS. The Basic Care Plan (routine screening as documented in Health Maintenance) for Talon meets the Care Plan requirement. This Care Plan has been established and reviewed with the  Patient.    Patient Instructions   Followup with endo in 2 months     Tone Huffman MD  [facility]    Identified Health Risks:    I have reviewed Opioid Use Disorder and Substance Use Disorder risk factors and made any needed referrals.

## 2023-07-03 DIAGNOSIS — Z12.11 SCREENING FOR COLON CANCER: Primary | ICD-10-CM

## 2023-08-23 ENCOUNTER — OFFICE VISIT (OUTPATIENT)
Dept: ENDOCRINOLOGY | Facility: CLINIC | Age: 73
End: 2023-08-23
Payer: COMMERCIAL

## 2023-08-23 ENCOUNTER — TELEPHONE (OUTPATIENT)
Dept: ENDOCRINOLOGY | Facility: CLINIC | Age: 73
End: 2023-08-23

## 2023-08-23 VITALS
WEIGHT: 84.5 LBS | HEART RATE: 59 BPM | SYSTOLIC BLOOD PRESSURE: 121 MMHG | BODY MASS INDEX: 17.74 KG/M2 | OXYGEN SATURATION: 98 % | DIASTOLIC BLOOD PRESSURE: 77 MMHG | HEIGHT: 58 IN

## 2023-08-23 DIAGNOSIS — M81.0 AGE RELATED OSTEOPOROSIS, UNSPECIFIED PATHOLOGICAL FRACTURE PRESENCE: Primary | ICD-10-CM

## 2023-08-23 DIAGNOSIS — E04.9 GOITER: ICD-10-CM

## 2023-08-23 PROCEDURE — 99214 OFFICE O/P EST MOD 30 MIN: CPT | Performed by: INTERNAL MEDICINE

## 2023-08-23 RX ORDER — ESTRADIOL 1 MG/1
0.5 TABLET ORAL WEEKLY
Qty: 6 TABLET | Refills: 3 | Status: SHIPPED | OUTPATIENT
Start: 2023-08-23 | End: 2024-08-28

## 2023-08-23 NOTE — PROGRESS NOTES
Endocrinology Follow-up Clinic Note    Talon SILVA Son MRN:[id number] YOB: 1950  Primary care provider: Vesta Foss     Reason for visit:             Assessment and Plan:   Ms. Son is a 73 yo female with PMH for osteoporosis (diagnosed with dexa 2017) on calcium/vitamin D, history of multinodular goiter (negative biopsies 2012, 2014) who presents today for follow up.    #Osteoporosis   Diagnosed 4/2017 with DEXA scan, repeat DEXA as noted above. Has been taking citracal petites (2 pills in the morning) for the past year. Was recommended to start IV reclast last year as well as fosamax but was not interested.   Osteoporosis relatively stable T score lumber -3,3 in 8/2022. She does not want to take Fosamax.      - repeat DEXA summer 2024  - continue citracal 2 tabs and Vit. D supplementation  - this year we will try small estrogen only 0.5 mg weekly to see if we can support her bone health, manmo gram has been negative, no history of DVT, CAD, Stroke, she understood risk benefit and she is interested in to try.     #Multinodular Goiter  Biopsies in 2012 and 2014 (both with nodules from right and left side sampled) - all negative. Patient denies any significant changes in her neck  And no clinical changes since last visit. Thyroid function normal last time checked in 2018.  Ultrasound in 2018 looked stable when compared to prior though did not get follow up that was recommended.     stable       - RTC with me in 1 year      30 minutes spent on the date of the encounter doing chart review, history and exam, documentation and further activities as noted above.    Linda Hodgson MD  Staff Physician  Endocrinology and Metabolism  [facility]  License: [id number]  Pager: [phone]      Interval History as of 8/23/2023 : Patient has been doing well. Medication compliance   excellent to supplement Ca. New event includes : no pertinent medical event noted .   Brief summary: 72 yo female with  PMH for osteoporosis (diagnosed with dexa 2017) on calcium/vitamin D, history of multinodular goiter (negative biopsies 2012, 2014) who presents today for follow up.    Osteoporosis was diagnosed 4/2017 with DEXA scan noted above. Has been taking citracal petites (2 pills in the morning) for the past year. Was recommended to start IV reclast and continue fosamax but patient reported she did not want to do either and was encouraged to continue with her calcium and vit D supplementation with  Weight bearing exercises.     Last DEXA in 2019  Conclusions:  The most negative and valid T-score of -3.4 at the level of the L1 - L3 lumbar spine corresponds with osteoporosis according to WHO criteria for postmenopausal females and men age 50 and over.     For her multinodular goiter, biopsies in 2012 and 2014 (both with nodules from right and left side sampled) - all negative. No repeat thyroid labs since last visit in 2019 when they were WNL. Last US in 2018 looks stable when compared to prior, plan was to follow up with repeat US in 2020 though this was not done.     Interval history:  Patient reports feeling ok today and in general. Denies heat/cold intolerenace, no changes in voice, reports normal appetite and eating ok. No nausea/vomiting, no palpitations, no shortness of breath. No swelling or difficulty swallowing.     For her bone health she is still taking her Vitamin D and calcium, taken every day. She does a lot of standing at her job but not a lot of exercising.     Review Of Systems  12 point ROS negative unless noted above    Past Medical History  Past Medical History:   Diagnosis Date    Thyroid disease     Vertigo        Past Surgical History  Past Surgical History:   Procedure Laterality Date    APPENDECTOMY  09/17/12    COLONOSCOPY  10/2007    repeat 10 yrs    COLONOSCOPY  6/2014    repeat 10 yrs        Medications  Current Outpatient Medications   Medication Sig Dispense Refill    Calcium Citrate-Vitamin D  (CALCIUM CITRATE PETITE/VIT D PO)       emollient (VANICREAM) external cream Apply topically as needed for other (dry skin) 453 g 1    multivitamin w/minerals (THERA-VIT-M) tablet Take 1 tablet by mouth daily      VITAMIN D, CHOLECALCIFEROL, PO Take 1,000 Units by mouth daily         Physical Examination:  General appearance: seated comfortably in the chair during assessment. Not in any acute distress  HEENT: PEERLA. Oral cavity is moist and clear. No palpable thyroid/neck abnormalities,  Lungs: bilateral air entry equal. Clear to auscultation. No rhonchi or crepitations heard  Heart: S1 S2 normal. Pulse: regular rate and rhythm, good volume  Abdomen: soft, nontender, nondistended  Neurological: conscious and oriented. Speech: normal. Moving all four extremities equally  Extremities: no edema noted. Dorsalis pedis 2+ bilaterally. No ulcers noted. No tremor is noted over her outstretched hands  Psychiatric: normal mood and affect. Normal judgment

## 2023-08-23 NOTE — LETTER
8/23/2023       RE: Talon Myers  [address]  [facility] [address]     Dear Colleague,    Thank you for referring your patient, Talon SILVA Son, to the [facility]. Please see a copy of my visit note below.    Endocrinology Follow-up Clinic Note    Talon Myers MRN:[id number] YOB: 1950  Primary care provider: Vesta Foss     Reason for visit:             Assessment and Plan:   Ms. Myers is a 73 yo female with PMH for osteoporosis (diagnosed with dexa 2017) on calcium/vitamin D, history of multinodular goiter (negative biopsies 2012, 2014) who presents today for follow up.    #Osteoporosis   Diagnosed 4/2017 with DEXA scan, repeat DEXA as noted above. Has been taking citracal petites (2 pills in the morning) for the past year. Was recommended to start IV reclast last year as well as fosamax but was not interested.   Osteoporosis relatively stable T score lumber -3,3 in 8/2022. She does not want to take Fosamax.      - repeat DEXA summer 2024  - continue citracal 2 tabs and Vit. D supplementation  - this year we will try small estrogen only 0.5 mg weekly to see if we can support her bone health, manmo gram has been negative, no history of DVT, CAD, Stroke, she understood risk benefit and she is interested in to try.     #Multinodular Goiter  Biopsies in 2012 and 2014 (both with nodules from right and left side sampled) - all negative. Patient denies any significant changes in her neck  And no clinical changes since last visit. Thyroid function normal last time checked in 2018.  Ultrasound in 2018 looked stable when compared to prior though did not get follow up that was recommended.     stable       - RTC with me in 1 year      30 minutes spent on the date of the encounter doing chart review, history and exam, documentation and further activities as noted above.    Linda Hodgson MD  Staff  Physician  Endocrinology and Metabolism  [facility]  License: [id number]  Pager: [phone]      Interval History as of 8/23/2023 : Patient has been doing well. Medication compliance   excellent to supplement Ca. New event includes : no pertinent medical event noted .   Brief summary: 72 yo female with PMH for osteoporosis (diagnosed with dexa 2017) on calcium/vitamin D, history of multinodular goiter (negative biopsies 2012, 2014) who presents today for follow up.    Osteoporosis was diagnosed 4/2017 with DEXA scan noted above. Has been taking citracal petites (2 pills in the morning) for the past year. Was recommended to start IV reclast and continue fosamax but patient reported she did not want to do either and was encouraged to continue with her calcium and vit D supplementation with  Weight bearing exercises.     Last DEXA in 2019  Conclusions:  The most negative and valid T-score of -3.4 at the level of the L1 - L3 lumbar spine corresponds with osteoporosis according to WHO criteria for postmenopausal females and men age 50 and over.     For her multinodular goiter, biopsies in 2012 and 2014 (both with nodules from right and left side sampled) - all negative. No repeat thyroid labs since last visit in 2019 when they were WNL. Last US in 2018 looks stable when compared to prior, plan was to follow up with repeat US in 2020 though this was not done.     Interval history:  Patient reports feeling ok today and in general. Denies heat/cold intolerenace, no changes in voice, reports normal appetite and eating ok. No nausea/vomiting, no palpitations, no shortness of breath. No swelling or difficulty swallowing.     For her bone health she is still taking her Vitamin D and calcium, taken every day. She does a lot of standing at her job but not a lot of exercising.     Review Of Systems  12 point ROS negative unless noted above    Past Medical History  Past Medical History:   Diagnosis Date     Thyroid disease     Vertigo        Past Surgical History  Past Surgical History:   Procedure Laterality Date    APPENDECTOMY  09/17/12    COLONOSCOPY  10/2007    repeat 10 yrs    COLONOSCOPY  6/2014    repeat 10 yrs        Medications  Current Outpatient Medications   Medication Sig Dispense Refill    Calcium Citrate-Vitamin D (CALCIUM CITRATE PETITE/VIT D PO)       emollient (VANICREAM) external cream Apply topically as needed for other (dry skin) 453 g 1    multivitamin w/minerals (THERA-VIT-M) tablet Take 1 tablet by mouth daily      VITAMIN D, CHOLECALCIFEROL, PO Take 1,000 Units by mouth daily         Physical Examination:  General appearance: seated comfortably in the chair during assessment. Not in any acute distress  HEENT: PEERLA. Oral cavity is moist and clear. No palpable thyroid/neck abnormalities,  Lungs: bilateral air entry equal. Clear to auscultation. No rhonchi or crepitations heard  Heart: S1 S2 normal. Pulse: regular rate and rhythm, good volume  Abdomen: soft, nontender, nondistended  Neurological: conscious and oriented. Speech: normal. Moving all four extremities equally  Extremities: no edema noted. Dorsalis pedis 2+ bilaterally. No ulcers noted. No tremor is noted over her outstretched hands  Psychiatric: normal mood and affect. Normal judgment    Linda Hodgson MD

## 2023-08-23 NOTE — TELEPHONE ENCOUNTER
[facility] Call Center    Phone Message    May a detailed message be left on voicemail: yes     Reason for Call: Medication Question or concern regarding medication   Prescription Clarification  Name of Medication: estradiol (ESTRACE) 1 MG tablet   Prescribing Provider: Dr Hodgson   Pharmacy: [facility]   What on the order needs clarification? Rx state take 1/2 tablet once a week, is this intentional or should it state once per day, please confirm with pharmacy      Action Taken: Other: endo    Travel Screening: Not Applicable

## 2023-08-23 NOTE — NURSING NOTE
"Chief Complaint   Patient presents with    Thyroid Problem     Vital signs:      BP: 121/77 Pulse: 59     SpO2: 98 %     Height: 147.3 cm (4' 10\") Weight: 38.3 kg (84 lb 8 oz)  Estimated body mass index is 17.66 kg/m  as calculated from the following:    Height as of this encounter: 1.473 m (4' 10\").    Weight as of this encounter: 38.3 kg (84 lb 8 oz).        "

## 2024-03-20 ENCOUNTER — ANCILLARY PROCEDURE (OUTPATIENT)
Dept: MAMMOGRAPHY | Facility: CLINIC | Age: 74
End: 2024-03-20
Payer: COMMERCIAL

## 2024-03-20 DIAGNOSIS — Z12.31 VISIT FOR SCREENING MAMMOGRAM: ICD-10-CM

## 2024-03-20 PROCEDURE — 77063 BREAST TOMOSYNTHESIS BI: CPT | Mod: GC | Performed by: STUDENT IN AN ORGANIZED HEALTH CARE EDUCATION/TRAINING PROGRAM

## 2024-03-20 PROCEDURE — 77067 SCR MAMMO BI INCL CAD: CPT | Mod: GC | Performed by: STUDENT IN AN ORGANIZED HEALTH CARE EDUCATION/TRAINING PROGRAM

## 2024-03-21 NOTE — RESULT ENCOUNTER NOTE
Geo Hanley, your recent results are back and are all normal. Please contact if any questions. Nice to see you in the hallway; hopefully soon in clinic!    Tone Huffman MD

## 2024-04-25 ENCOUNTER — OFFICE VISIT (OUTPATIENT)
Dept: OPHTHALMOLOGY | Facility: CLINIC | Age: 74
End: 2024-04-25
Payer: COMMERCIAL

## 2024-04-25 DIAGNOSIS — H18.522 ANTERIOR BASEMENT MEMBRANE DYSTROPHY OF LEFT EYE: ICD-10-CM

## 2024-04-25 DIAGNOSIS — H02.831 DERMATOCHALASIS OF BOTH UPPER EYELIDS: ICD-10-CM

## 2024-04-25 DIAGNOSIS — H11.002 PTERYGIUM, LEFT: ICD-10-CM

## 2024-04-25 DIAGNOSIS — H52.03 HYPERMETROPIA OF BOTH EYES: ICD-10-CM

## 2024-04-25 DIAGNOSIS — H02.834 DERMATOCHALASIS OF BOTH UPPER EYELIDS: ICD-10-CM

## 2024-04-25 DIAGNOSIS — H35.363 RETINAL DRUSEN OF BOTH EYES: ICD-10-CM

## 2024-04-25 DIAGNOSIS — H25.813 COMBINED FORMS OF AGE-RELATED CATARACT OF BOTH EYES: Primary | ICD-10-CM

## 2024-04-25 PROCEDURE — 92014 COMPRE OPH EXAM EST PT 1/>: CPT | Performed by: OPTOMETRIST

## 2024-04-25 ASSESSMENT — REFRACTION_WEARINGRX
OD_SPHERE: +2.00
OD_CYLINDER: +0.50
SPECS_TYPE: PAL
OS_ADD: +2.50
OS_SPHERE: +1.50
OD_AXIS: 077
OS_CYLINDER: +0.25
OD_ADD: +2.50
OS_AXIS: 120

## 2024-04-25 ASSESSMENT — TONOMETRY
OD_IOP_MMHG: 15
OS_IOP_MMHG: 14
IOP_METHOD: ICARE

## 2024-04-25 ASSESSMENT — SLIT LAMP EXAM - LIDS
COMMENTS: DERMATOCHALASIS, 1+ MGD
COMMENTS: DERMATOCHALASIS, 1+ MGD

## 2024-04-25 ASSESSMENT — CONF VISUAL FIELD
OD_NORMAL: 1
OD_SUPERIOR_NASAL_RESTRICTION: 0
OS_INFERIOR_NASAL_RESTRICTION: 0
OS_SUPERIOR_NASAL_RESTRICTION: 0
METHOD: COUNTING FINGERS
OD_INFERIOR_TEMPORAL_RESTRICTION: 0
OS_INFERIOR_TEMPORAL_RESTRICTION: 0
OD_SUPERIOR_TEMPORAL_RESTRICTION: 0
OS_NORMAL: 1
OS_SUPERIOR_TEMPORAL_RESTRICTION: 0
OD_INFERIOR_NASAL_RESTRICTION: 0

## 2024-04-25 ASSESSMENT — REFRACTION_MANIFEST
OS_AXIS: 126
OD_AXIS: 071
OD_CYLINDER: +0.25
OD_SPHERE: +2.00
OS_SPHERE: +2.00
OS_ADD: +2.50
OD_ADD: +2.50
OS_CYLINDER: +0.25

## 2024-04-25 ASSESSMENT — CUP TO DISC RATIO
OD_RATIO: 0.45
OS_RATIO: 0.4

## 2024-04-25 ASSESSMENT — VISUAL ACUITY
OS_CC+: +/-1
METHOD: SNELLEN - LINEAR
OS_CC: 20/40
OD_CC+: -2
OD_CC: 20/40

## 2024-04-25 ASSESSMENT — EXTERNAL EXAM - LEFT EYE: OS_EXAM: NORMAL

## 2024-04-25 ASSESSMENT — EXTERNAL EXAM - RIGHT EYE: OD_EXAM: NORMAL

## 2024-04-25 NOTE — PROGRESS NOTES
History  HPI       Annual Eye Exam    In both eyes.  Onset was gradual.  This started 1 year ago.  Severity is mild.  Since onset it is gradually worsening.  Associated symptoms include Negative for glare and haloes.             Comments    Vision is decreased at both distance and near compared to last visit and she thinks she needs new glasses.  Has occasional eye pain in the morning.  Denies flashes or floaters.  Denies history of diabetes.  Denies glare or halos around lights.      Keke Mcdonnell on 4/25/2024 at 8:16 AM             Last edited by Keke Mcdonnell on 4/25/2024  8:17 AM.          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 Discussed options including monitoring vs surgery. The patient is interested in surgery. Referred to Dr. Kim for cataract consultation.    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 Monitor annually.    (H35.363) Retinal drusen of both eyes  Comment: Not visually significant, stable  Plan:    Monitor annually.    (H02.831,  H02.834) Dermatochalasis of both upper eyelids  Comment: Asymptomatic  Plan:    No treatment indicated at this time. Monitor annually.    Complete documentation of historical and exam elements from today's encounter can  be found in the full encounter summary report (not reduplicated in this progress  note). I personally obtained the chief complaint(s) and history of present illness. I  confirmed and edited as necessary the review of systems, past medical/surgical  history, family history, social history, and examination findings as documented  by  others; and I examined the patient myself. I personally reviewed the relevant tests,  images, and reports as documented above. I formulated and edited as necessary the  assessment and plan and discussed the findings and management plan with the  patient and family.    Jasen Lee OD, FAAO

## 2024-04-25 NOTE — NURSING NOTE
Chief Complaints and History of Present Illnesses   Patient presents with    Annual Eye Exam     Chief Complaint(s) and History of Present Illness(es)       Annual Eye Exam              Laterality: both eyes    Onset: gradual    Onset: 1 year ago    Severity: mild    Course: gradually worsening    Associated symptoms: Negative for glare and haloes              Comments    Vision is decreased at both distance and near compared to last visit and she thinks she needs new glasses.  Has occasional eye pain in the morning.  Denies flashes or floaters.  Denies history of diabetes.  Denies glare or halos around lights.      Keke Mcdonnell on 4/25/2024 at 8:16 AM

## 2024-04-26 NOTE — TELEPHONE ENCOUNTER
FUTURE VISIT INFORMATION      FUTURE VISIT INFORMATION:  Date: 7/16/24  Time: 10:40am  [place] [facility] [place]:  Referring provider:  Rosa  Referring providers clinic:  [facility]  Reason for visit/diagnosis  Dermatochalasis of both upper eyelids     RECORDS REQUESTED FROM:       Clinic name Comments Records Status Imaging Status   [facility] OV/referral 4/25/24  Ov/notes 4/25/24-3/18/21 EPIC

## 2024-06-21 SDOH — HEALTH STABILITY: PHYSICAL HEALTH: ON AVERAGE, HOW MANY DAYS PER WEEK DO YOU ENGAGE IN MODERATE TO STRENUOUS EXERCISE (LIKE A BRISK WALK)?: 1 DAY

## 2024-06-21 SDOH — HEALTH STABILITY: PHYSICAL HEALTH: ON AVERAGE, HOW MANY MINUTES DO YOU ENGAGE IN EXERCISE AT THIS LEVEL?: 30 MIN

## 2024-06-21 ASSESSMENT — SOCIAL DETERMINANTS OF HEALTH (SDOH): HOW OFTEN DO YOU GET TOGETHER WITH FRIENDS OR RELATIVES?: ONCE A WEEK

## 2024-06-26 ENCOUNTER — OFFICE VISIT (OUTPATIENT)
Dept: FAMILY MEDICINE | Facility: CLINIC | Age: 74
End: 2024-06-26
Payer: COMMERCIAL

## 2024-06-26 VITALS
TEMPERATURE: 97.8 F | DIASTOLIC BLOOD PRESSURE: 76 MMHG | HEART RATE: 62 BPM | HEIGHT: 58 IN | OXYGEN SATURATION: 100 % | WEIGHT: 79 LBS | BODY MASS INDEX: 16.58 KG/M2 | SYSTOLIC BLOOD PRESSURE: 123 MMHG

## 2024-06-26 DIAGNOSIS — Z13.6 CARDIOVASCULAR SCREENING; LDL GOAL LESS THAN 130: ICD-10-CM

## 2024-06-26 DIAGNOSIS — Z12.11 SCREEN FOR COLON CANCER: ICD-10-CM

## 2024-06-26 DIAGNOSIS — M81.0 AGE-RELATED OSTEOPOROSIS WITHOUT CURRENT PATHOLOGICAL FRACTURE: ICD-10-CM

## 2024-06-26 DIAGNOSIS — R21 RASH: ICD-10-CM

## 2024-06-26 DIAGNOSIS — Z00.00 WELLNESS EXAMINATION: Primary | ICD-10-CM

## 2024-06-26 DIAGNOSIS — E04.2 NON-TOXIC MULTINODULAR GOITER: ICD-10-CM

## 2024-06-26 LAB
ALBUMIN SERPL BCG-MCNC: 4.2 G/DL (ref 3.5–5.2)
ALP SERPL-CCNC: 49 U/L (ref 40–150)
ALT SERPL W P-5'-P-CCNC: 23 U/L (ref 0–50)
ANION GAP SERPL CALCULATED.3IONS-SCNC: 9 MMOL/L (ref 7–15)
AST SERPL W P-5'-P-CCNC: 31 U/L (ref 0–45)
BILIRUB SERPL-MCNC: 0.5 MG/DL
BUN SERPL-MCNC: 24.7 MG/DL (ref 8–23)
CALCIUM SERPL-MCNC: 9.5 MG/DL (ref 8.8–10.2)
CHLORIDE SERPL-SCNC: 105 MMOL/L (ref 98–107)
CHOLEST SERPL-MCNC: 229 MG/DL
CREAT SERPL-MCNC: 0.91 MG/DL (ref 0.51–0.95)
DEPRECATED HCO3 PLAS-SCNC: 26 MMOL/L (ref 22–29)
EGFRCR SERPLBLD CKD-EPI 2021: 67 ML/MIN/1.73M2
ERYTHROCYTE [DISTWIDTH] IN BLOOD BY AUTOMATED COUNT: 12.4 % (ref 10–15)
FASTING STATUS PATIENT QL REPORTED: YES
FASTING STATUS PATIENT QL REPORTED: YES
GLUCOSE SERPL-MCNC: 93 MG/DL (ref 70–99)
HCT VFR BLD AUTO: 35.9 % (ref 35–47)
HDLC SERPL-MCNC: 97 MG/DL
HGB BLD-MCNC: 12.1 G/DL (ref 11.7–15.7)
LDLC SERPL CALC-MCNC: 120 MG/DL
MCH RBC QN AUTO: 33.1 PG (ref 26.5–33)
MCHC RBC AUTO-ENTMCNC: 33.7 G/DL (ref 31.5–36.5)
MCV RBC AUTO: 98 FL (ref 78–100)
NONHDLC SERPL-MCNC: 132 MG/DL
PLATELET # BLD AUTO: 199 10E3/UL (ref 150–450)
POTASSIUM SERPL-SCNC: 3.9 MMOL/L (ref 3.4–5.3)
PROT SERPL-MCNC: 7.4 G/DL (ref 6.4–8.3)
RBC # BLD AUTO: 3.66 10E6/UL (ref 3.8–5.2)
SODIUM SERPL-SCNC: 140 MMOL/L (ref 135–145)
TRIGL SERPL-MCNC: 61 MG/DL
TSH SERPL DL<=0.005 MIU/L-ACNC: 0.38 UIU/ML (ref 0.3–4.2)
WBC # BLD AUTO: 2.9 10E3/UL (ref 4–11)

## 2024-06-26 PROCEDURE — 99397 PER PM REEVAL EST PAT 65+ YR: CPT | Performed by: FAMILY MEDICINE

## 2024-06-26 PROCEDURE — 80053 COMPREHEN METABOLIC PANEL: CPT | Performed by: FAMILY MEDICINE

## 2024-06-26 PROCEDURE — 36415 COLL VENOUS BLD VENIPUNCTURE: CPT | Performed by: FAMILY MEDICINE

## 2024-06-26 PROCEDURE — 84443 ASSAY THYROID STIM HORMONE: CPT | Performed by: FAMILY MEDICINE

## 2024-06-26 PROCEDURE — 80061 LIPID PANEL: CPT | Performed by: FAMILY MEDICINE

## 2024-06-26 PROCEDURE — 85027 COMPLETE CBC AUTOMATED: CPT | Performed by: FAMILY MEDICINE

## 2024-06-26 PROCEDURE — 99213 OFFICE O/P EST LOW 20 MIN: CPT | Performed by: FAMILY MEDICINE

## 2024-06-26 ASSESSMENT — PAIN SCALES - GENERAL: PAINLEVEL: NO PAIN (0)

## 2024-06-26 NOTE — PROGRESS NOTES
Preventive Care Visit  [facility]TEGRATED PRIMARY [facility]  Tone Huffman MD, Family Medicine  Jun 26, 2024      Assessment & Plan     Wellness examination  Reviewed annual health maintenance protocols  - Comprehensive metabolic panel (BMP + Alb, Alk Phos, ALT, AST, Total. Bili, TP); Future  - Comprehensive metabolic panel (BMP + Alb, Alk Phos, ALT, AST, Total. Bili, TP)    Rash  Acute, possibly related to venous stasis with hemosiderin staining.   - CBC with platelets; Future  - CBC with platelets    Non-toxic multinodular goiter  Stable, check thyroid function today  - TSH with free T4 reflex; Future  - TSH with free T4 reflex    Age-related osteoporosis without current pathological fracture  Stable, not on reclast or fosamax, on oral calcium and vitamin D supplementation.     CARDIOVASCULAR SCREENING; LDL GOAL LESS THAN 130  LDL 99 in 2022, check lipids today  - Lipid panel reflex to direct LDL Fasting; Future  - Lipid panel reflex to direct LDL Fasting    Screen for colon cancer  Last colonoscopy 6/2014, referral placed today  - Colonoscopy Screening  Referral; Future    Counseling  Appropriate preventive services were discussed with this patient, including applicable screening as appropriate for fall prevention, nutrition, physical activity, and cognition.  Checklist reviewing preventive services available has been given to the patient.  Reviewed patient's diet, addressing concerns and/or questions.   The patient was instructed to see the dentist every 6 months.     Moises Hanley is a 73 year old, presenting for the following:  Wellness Visit        6/26/2024     8:06 AM   Additional Questions   Roomed by Lc PATHAK        Health Care Directive  Patient does not have a Health Care Directive or Living Will: Discussed advance care planning with patient; information given to patient to review.    HPI  Ms. Son presents today for annual wellness visit and reports feeling generally well.   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  She feels she eats well, fresh fruits and vegetables, and grains, denies a history of smoking and does not drink alcohol.  She is concerned about a rash on her L leg that has progressed over the past number of months.  The rash was originally red/pink and itchy, however it is now cafe-au-lait colored and no longer itching.      Osteoporosis  Last DEXA scan 8/2022, most negative T-score -3.3.  Visit with endocrinologist 8/2023, pt not interested in reclast or fosamax medication therapy.  She does take Calcium citrate-vitamin D and vitamin D supplementation.  No history of fractures.  Recommended to RTC in 1 year and DEXA in 2024    Goiter:  Stable, slight weight loss since last visit, denies hair or skin changes, constipation, loose stools.      Rash  Onset/Duration: Months  Description  Location: bilateral lower extremity, L>R leg  Character: blotchy  Itching: itching originally, no longer itching at this time  Intensity:  mild  Progression of Symptoms:  coloration changed from pink/red to brown  Accompanying signs and symptoms:   Fever: No  Body aches or joint pain: No  Sore throat symptoms: No  Recent cold symptoms: No  History:           Previous episodes of similar rash: None  New exposures:  None  Recent travel: No  Exposure to similar rash: No  Precipitating or alleviating factors: none  Therapies tried and outcome: topical lotion        6/21/2024   General Health   How would you rate your overall physical health? (!) FAIR   Feel stress (tense, anxious, or unable to sleep) Not at all            6/21/2024   Nutrition   Diet: Regular (no restrictions)            6/21/2024   Exercise   Days per week of moderate/strenous exercise 1 day   Average minutes spent exercising at this level 30 min      (!) EXERCISE CONCERN      6/21/2024   Social Factors   Frequency of gathering  with friends or relatives Once a week   Worry food won't last until get money to buy more No   Food not last or not have enough money for food? No   Do you have housing? (Housing is defined as stable permanent housing and does not include staying ouside in a car, in a tent, in an abandoned building, in an overnight shelter, or couch-surfing.) Yes   Are you worried about losing your housing? No   Lack of transportation? No   Unable to get utilities (heat,electricity)? No            6/21/2024   Fall Risk   Fallen 2 or more times in the past year? No    No   Trouble with walking or balance? No    No       Multiple values from one day are sorted in reverse-chronological order          6/21/2024   Activities of Daily Living- Home Safety   Needs help with the following daily activites None of the above   Safety concerns in the home None of the above            6/21/2024   Dental   Dentist two times every year? (!) NO            6/21/2024   Hearing Screening   Hearing concerns? None of the above            6/21/2024   Driving Risk Screening   Patient/family members have concerns about driving No            6/21/2024   General Alertness/Fatigue Screening   Have you been more tired than usual lately? No            6/21/2024   Urinary Incontinence Screening   Bothered by leaking urine in past 6 months No            6/21/2024   TB Screening   Were you born outside of the [place]? Yes            Today's PHQ-2 Score:       6/25/2024     1:31 PM   PHQ-2 ( 1999 [employer])   Q1: Little interest or pleasure in doing things 0   Q2: Feeling down, depressed or hopeless 0   PHQ-2 Score 0   Q1: Little interest or pleasure in doing things Not at all   Q2: Feeling down, depressed or hopeless Not at all   PHQ-2 Score 0           6/21/2024   Substance Use   Alcohol more than 3/day or more than 7/wk Not Applicable   Do you have a current opioid prescription? No   How severe/bad is pain from 1 to 10? 0/10 (No Pain)   Do you use any other substances  recreationally? No        Social History     Tobacco Use    Smoking status: Never    Smokeless tobacco: Never   Vaping Use    Vaping status: Never Used   Substance Use Topics    Alcohol use: No    Drug use: No           3/20/2024   LAST FHS-7 RESULTS   1st degree relative breast or ovarian cancer No   Any relative bilateral breast cancer No   Any male have breast cancer No   Any ONE woman have BOTH breast AND ovarian cancer No   Any woman with breast cancer before 50yrs No   2 or more relatives with breast AND/OR ovarian cancer No   2 or more relatives with breast AND/OR bowel cancer No           Mammogram Screening - Mammogram every 1-2 years updated in Health Maintenance based on mutual decision making    ASCVD Risk   The 10-year ASCVD risk score (Matteo SABA, et al., 2019) is: 9.2%    Values used to calculate the score:      Age: 73 years      Sex: Female      Is Non- : No      Diabetic: No      Tobacco smoker: No      Systolic Blood Pressure: 123 mmHg      Is BP treated: No      HDL Cholesterol: 94 mg/dL      Total Cholesterol: 211 mg/dL    Fracture Risk Assessment Tool  Link to Frax Calculator  Use the information below to complete the Frax calculator  : 1950  Sex: female  Weight (kg): 35.8 kg (actual weight)  Height (cm): 147.2 cm  Previous Fragility Fracture:  No  History of parent with fractured hip:  unsure  Current Smoking:  No  Patient has been on glucocorticoids for more than 3 months (5mg/day or more): No  Rheumatoid Arthritis on Problem List:  No  Secondary Osteoporosis on Problem List:  No  Consumes 3 or more units of alcohol per day: No  Femoral Neck BMD (g/cm2)            Reviewed and updated as needed this visit by Provider                    Past Medical History:   Diagnosis Date    Thyroid disease     Vertigo      Current providers sharing in care for this patient include:  Patient Care Team:  No Ref-Primary, Physician as PCP - Jasen Steel, JOSEPH  "as Assigned Surgical Provider  Linda Hodgson MD as Assigned Endocrinology Provider  Tone Huffman MD as Assigned PCP    The following health maintenance items are reviewed in Epic and correct as of today:  Health Maintenance   Topic Date Due    RSV VACCINE (Pregnancy & 60+) (1 - 1-dose 60+ series) Never done    COVID-19 Vaccine (5 - 2023-24 season) 11/06/2023    ANNUAL REVIEW OF HM ORDERS  06/23/2024    COLORECTAL CANCER SCREENING  06/03/2024    MEDICARE ANNUAL WELLNESS VISIT  06/23/2024    FALL RISK ASSESSMENT  06/26/2025    MAMMO SCREENING  03/20/2026    GLUCOSE  06/23/2026    LIPID  03/22/2027    ADVANCE CARE PLANNING  06/23/2028    DTAP/TDAP/TD IMMUNIZATION (3 - Td or Tdap) 03/22/2032    DEXA  08/11/2037    HEPATITIS C SCREENING  Completed    PHQ-2 (once per calendar year)  Completed    INFLUENZA VACCINE  Completed    Pneumococcal Vaccine: 65+ Years  Completed    ZOSTER IMMUNIZATION  Completed    IPV IMMUNIZATION  Aged Out    HPV IMMUNIZATION  Aged Out    MENINGITIS IMMUNIZATION  Aged Out    RSV MONOCLONAL ANTIBODY  Aged Out         Review of Systems  Constitutional, HEENT, cardiovascular, pulmonary, GI, , musculoskeletal, neuro, skin, endocrine and psych systems are negative, except as otherwise noted.     Objective    Exam  /76 (BP Location: Left arm, Patient Position: Sitting, Cuff Size: Adult Small)   Pulse 62   Temp 97.8  F (36.6  C) (Temporal)   Ht 1.472 m (4' 9.95\")   Wt 35.8 kg (79 lb)   LMP  (LMP Unknown)   SpO2 100%   BMI 16.54 kg/m     Estimated body mass index is 16.54 kg/m  as calculated from the following:    Height as of this encounter: 1.472 m (4' 9.95\").    Weight as of this encounter: 35.8 kg (79 lb).    Physical Exam  GENERAL: alert and no distress  EYES: Eyes grossly normal to inspection, PERRL and conjunctivae and sclerae normal  HENT: normal cephalic/atraumatic, both ears: occluded with wax, nose and mouth without ulcers or lesions, oropharynx clear, and oral mucous " membranes moist  NECK: no adenopathy, no asymmetry, masses, or scars  RESP: lungs clear to auscultation - no rales, rhonchi or wheezes  CV: regular rate and rhythm, normal S1 S2, no S3 or S4, no murmur, click or rub, no peripheral edema  ABDOMEN: soft, nontender, no hepatosplenomegaly, no masses and bowel sounds normal  MS: no gross musculoskeletal defects noted, no edema  SKIN: L leg rash, large patch (5cm) with possible hemosiderin staining, on anterior aspect of shin, small patch on R leg (2cm)  NEURO: Normal strength and tone, mentation intact and speech normal  PSYCH: mentation appears normal, affect normal/bright        6/26/2024   Mini Cog   Clock Draw Score 2 Normal   3 Item Recall 3 objects recalled   Mini Cog Total Score 5                Signed Electronically by: Tone Huffman MD

## 2024-06-26 NOTE — PATIENT INSTRUCTIONS
Contact me for paper work for pre-op physical for cataract surgery in July    Colonoscopy screening ordered, you can call to schedule    Covid vax in the fall    If itching persists with leg rash, can try topical benadryl cream or hydrocortisone cream     The 10-year ASCVD risk score (Matteo SABA, et al., 2019) is: 9.2%    Values used to calculate the score:      Age: 73 years      Sex: Female      Is Non- : No      Diabetic: No      Tobacco smoker: No      Systolic Blood Pressure: 123 mmHg      Is BP treated: No      HDL Cholesterol: 94 mg/dL      Total Cholesterol: 211 mg/dL

## 2024-07-01 ENCOUNTER — TELEPHONE (OUTPATIENT)
Dept: OPHTHALMOLOGY | Facility: CLINIC | Age: 74
End: 2024-07-01
Payer: COMMERCIAL

## 2024-07-01 NOTE — TELEPHONE ENCOUNTER
Spoke with patient regarding sooner appointment from the wait-list. Patient declined scheduling as offered and removed from the wait-list as requested. -Per Patient

## 2024-07-08 ENCOUNTER — TELEPHONE (OUTPATIENT)
Dept: GASTROENTEROLOGY | Facility: CLINIC | Age: 74
End: 2024-07-08
Payer: COMMERCIAL

## 2024-07-08 NOTE — TELEPHONE ENCOUNTER
"Endoscopy Scheduling Screen    Have you had a positive Covid test in the last 14 days?  No    What is your communication preference for Instructions and/or Bowel Prep?   MyChart    What insurance is in the chart?  Other:  [employer]    Ordering/Referring Provider:     TATI QUEEN      (If ordering provider performs procedure, schedule with ordering provider unless otherwise instructed. )    BMI: Estimated body mass index is 16.54 kg/m  as calculated from the following:    Height as of 6/26/24: 1.472 m (4' 9.95\").    Weight as of 6/26/24: 35.8 kg (79 lb).     Sedation Ordered  moderate sedation.   If patient BMI > 50 do not schedule in [facility].    If patient BMI > 45 do not schedule at [facility].    Are you taking methadone or Suboxone?  No    Have you had difficulties, pain, or discomfort during past endoscopy procedures?  No    Are you taking any prescription medications for pain 3 or more times per week?   NO, No RN review required.    Do you have a history of malignant hyperthermia?  No    (Females) Are you currently pregnant?        Have you been diagnosed or told you have pulmonary hypertension?   No    Do you have an LVAD?  No    Have you been told you have moderate to severe sleep apnea?  No    Have you been told you have COPD, asthma, or any other lung disease?  No    Do you have any heart conditions?  No     Have you ever had or are you waiting for an organ transplant?  No. Continue scheduling, no site restrictions.    Have you had a stroke or transient ischemic attack (TIA aka \"mini stroke\" in the last 6 months?   No    Have you been diagnosed with or been told you have cirrhosis of the liver?   No    Are you currently on dialysis?   No    Do you need assistance transferring?   No    BMI: Estimated body mass index is 16.54 kg/m  as calculated from the following:    Height as of 6/26/24: 1.472 m (4' 9.95\").    Weight as of 6/26/24: 35.8 kg (79 lb).     Is patients BMI > 40 and scheduling location [facility]?  No    Do " you take an injectable medication for weight loss or diabetes (excluding insulin)?  No    Do you take the medication Naltrexone?  No    Do you take blood thinners?  No       Prep   Are you currently on dialysis or do you have chronic kidney disease?  No    Do you have a diagnosis of diabetes?  No    Do you have a diagnosis of cystic fibrosis (CF)?  No    On a regular basis do you go 3 -5 days between bowel movements?  No    BMI > 40?  No    Preferred Pharmacy:    [place], [place] - [address]  [address]  [address]  [facility] [address]  Phone: [phone] Fax: [phone] Alternate Fax: [phone], [phone], [phone]      Final Scheduling Details     Procedure scheduled  Colonoscopy    Surgeon:       Date of procedure:  7/24     Pre-OP / PAC:   No - Not required for this site.    Location  [facility] - [facility] - Per order.    Sedation   Moderate Sedation - Per order.      Patient Reminders:   You will receive a call from a Nurse to review instructions and health history.  This assessment must be completed prior to your procedure.  Failure to complete the Nurse assessment may result in the procedure being cancelled.      On the day of your procedure, please designate an adult(s) who can drive you home stay with you for the next 24 hours. The medicines used in the exam will make you sleepy. You will not be able to drive.      You cannot take public transportation, ride share services, or non-medical taxi service without a responsible caregiver.  Medical transport services are allowed with the requirement that a responsible caregiver will receive you at your destination.  We require that drivers and caregivers are confirmed prior to your procedure.

## 2024-07-11 ENCOUNTER — TELEPHONE (OUTPATIENT)
Dept: OPHTHALMOLOGY | Facility: CLINIC | Age: 74
End: 2024-07-11
Payer: COMMERCIAL

## 2024-07-15 ENCOUNTER — TELEPHONE (OUTPATIENT)
Dept: GASTROENTEROLOGY | Facility: CLINIC | Age: 74
End: 2024-07-15
Payer: COMMERCIAL

## 2024-07-15 DIAGNOSIS — Z12.11 ENCOUNTER FOR SCREENING COLONOSCOPY: Primary | ICD-10-CM

## 2024-07-15 NOTE — TELEPHONE ENCOUNTER
Pre visit planning completed.      Procedure details:    Patient scheduled for Colonoscopy  on 7/24/24.     Arrival time: 1000. Procedure time 1100    Facility location: [facility]; [address] [place] [address], [address] Floor, [place] [address]. Check in location: 5th Floor.    Sedation type: Conscious sedation     Pre op exam needed? N/A    Indication for procedure: Screening      Chart review:     Electronic implanted devices? No    Recent diagnosis of diverticulitis within the last 6 weeks? No    Diabetic? No      Medication review:    Anticoagulants? No    NSAIDS? No    Other medication HOLDING recommendations:  N/A      Prep for procedure:     Bowel prep recommendation: Standard Miralax  Due to: standard bowel prep.    Prep instructions sent via [employer]         Jill Pat RN  Endoscopy Procedure Pre Assessment RN  [phone] option 4

## 2024-07-15 NOTE — LETTER
July 16, 2024      Talon Myers  [address]   [facility] [address]              Dear Talon,      Colonoscopy  - Updated     Procedure date: 7/24/24     Anticipated arrival time: 1000 AM   (Please note that arrival times may change)     [facility]; [address], 5th Floor, [place] [address] - Check in location: 5th Floor. Parking information: Self pay parking is available in West Lot located directly across from the main entrance of the [facility]. Entry and exit to this lot is on [facility]. In addition, self pay parking is available in [place] [employer] [address] which is located west of [facility]. Follow the center jayshree designated ' [employer] [place]' to ground-level spaces that are reserved for patients. Please disregard any signage indicating the ramp is full or available by reservation only as this does not pertain to the spaces dedicated for patients coming to the clinic.  services are available for patients with limited mobility. Services available Monday-Friday, 7:00a.m.-  5:00p.m.        Important Procedure Reminders:      Prep Instructions:   Instructions on how to prepare for your upcoming procedure are found below. Please read instructions carefully. Deviation from instructions may result in less than desired outcomes and procedure may need to be rescheduled.   If you have additional questions regarding how to prepare for your upcoming procedure, contact our endoscopy pre assessment nurses at [phone] option 4 Monday through Friday 7:00am-5:00pm      Policy:   The medications used during the procedure will make you sleepy, so you won't be able to drive. On the day of your procedure, please have an adult ready to drive you home and stay with you for the next 6 hours.   You can't use public transportation, ride-share services, or non-medical taxi services without a responsible caregiver. Medical transport services are okay, but a caregiver must  be there to receive you at your destination.   Make sure your  and caregiver are confirmed before your procedure.        Day of procedure:  Please keep in mind that arrival times may change. Let your  know there might be a one-hour window for changes.  We ask that you please check in at the  with your . Your  should remain on campus.  Expect to be at the procedure center for about 1.5-2.5 hours.    Please do not wear jewelry (i.e. earrings, rings, necklaces, watches, etc). Leave your purse, billfold, credit cards, and other valuables at home.   Bring insurance card and ID.      To cancel or reschedule your procedure:   Within 14 days of your procedure if you develop any flu-like symptoms (such as fever, cough, shortness of breath), COVID-19 like symptoms or exposure to COVID-19, contact our endoscopy team at [phone] option 4 to determine if procedure can be completed or needs to be delayed.   If you need to cancel or reschedule, our endoscopy scheduling team can be reached at [phone], option 2. Monday through Friday, 7:00am-5:00pm.        Medication Reminders:     Please note the following medication holding recommendations:   N/A      Standard Golytely (Colyte, Nulytely) Bowel Prep   Prep instructions for your colonoscopy   For prep questions, please call: [facility] - [address] [place] [address], 5th Floor, [place] [address] - [phone] option 4      Bowel prep has been sent to    [place], [place] - [address]      Please read these instructions carefully at least 7 days prior to your colonoscopy procedure. Be sure to follow all directions completely. The inside of your colon must be clean to allow for a complete examination for the presence of any growths, polyps, and/or abnormalities, as well as their biopsy or removal. A number of tips are included in order to make this part of the procedure as comfortable as  possible.    Getting ready    prescription at the pharmacy. Endoscopy team will order this about 2 weeks before to your scheduled procedure. Included in the kit will be the followin Dulcolax (Bisacodyl) 5mg tablets  1 container of Polyethylene Glycol (Golytely, Colyte, Nulytely, Gavilyte-G)    A nurse will call you to go over your appointment details and prep instructions. Not completing the nurse call could result with your appointment being cancelled.    You must arrange for an adult to drive you home after your exam. Your colonoscopy cannot be done unless you have a ride. If you need to use public transportation, someone must ride with you and stay with you for up to 24 hours.       7 days before procedure   Consult with your prescribing provider about stopping any:     Diabetic/Weight Loss Injectable Medication GLP-1 agonist (such as Exenatide (Byetta, Bydureon),  Mounjaro (Tirzepatide).  Ozempic (Semaglutide). Semaglutide. Symlin (Pamlintide), Tanzeum (Albiglutide). Tirzepatide-Weight Management (Zepbound), Trulicity (Dulaglutide), Victoza (Saxenda, Liraglutide), Wegovy (Semaglutide) please follow below guidelines for holding:    For weekly injection HOLD 7 days before procedure.  For once or twice a day injection HOLD the day before procedure and day of procedure.  For oral, daily dosing (Rybelsus) HOLD 7 days before procedure.    Blood thinning and/or anti platelet medications: such as Coumadin, Plavix, Xarelto, Eliquis, Lovenox or others, these medications may need to be stopped temporarily before your procedure.     If you take insulin for diabetes, ask your prescribing provider for instructions on how to manage this medication while preparing for a colonoscopy.     NSAIDs medications such as Sulindac, Celebrex, Mobic, Relafen or others may need to be stopped before the procedure. This will be discussed during nurse review call or you can reach out to your prescribing provider.     Stop taking  iron (ferrous sulfate), multivitamins that contain iron, and/or fiber supplements (Metamucil, Benefiber, Psyllium husk powder, Fibercon, Bran, etc.).      Stop eating whole kernel corn, popcorn, nuts, and foods that contain seeds. These can stay in the colon for many days and they can clog up the colonoscope.       3 days before procedure     Begin a low-fiber diet (see examples below). No Olestra (a fat substitute).    Consume no more than 10-15 grams of fiber each day.     It is important to stay hydrated. Drink at least eight 8-ounce glasses of water a day.      LOW FIBER DIET   You can have:   Do not have:    Starches: White bread, rolls, biscuits, croissants, [place] toast, white flour tortillas, waffles, pancakes, Uzbek toast; white rice, noodles, pasta, macaroni; cooked and peeled potatoes; plain crackers, saltines; cooked farina or cream of rice; puffed rice, corn flakes, Rice Krispies, Special K      Vegetables: tender cooked and canned, vegetable broths     Fruits and fruit juices: Strained fruit juice, canned fruit without seeds or skin (not pineapple), applesauce, pear sauce, ripe bananas, melons (not watermelon)     Milk products: Milk (plain or flavored), cheese, cottage cheese, yogurt (no berries), custard, ice cream       Proteins: Tender, well-cooked ground beef, lamb, veal, ham, pork, chicken, turkey, fish or organ meat, Tofu, eggs, creamy peanut butter      Fats and condiments:  Margarine, butter, oils, mayonnaise, sour cream, salad dressing, plain gravy; spices, cooked herbs; sugar, clear jelly, honey, syrup      Snacks, sweets and drinks: Pretzels, hard candy; plain cakes and cookies (no nuts or seeds); gelatin, plain pudding, sherbet, Popsicles; coffee, tea, carbonated ( fizzy ) drinks    Starches: Breads or rolls that contain nuts, seeds or fruit; whole wheat or whole grain breads that contain more than 2 grams of fiber per serving; cornbread; corn or whole wheat tortillas; potatoes with skin;  brown rice, wild rice, quinoa, kasha (buckwheat), and oatmeal      Vegetables: Any raw or steamed vegetables; vegetables with seeds; corn in any form      Fruits and fruit juices: Prunes, prune juice, raisins and other dried fruits, berries and other fruits with seeds, canned pineapple juices with pulp such as orange, grapefruit, pineapple or tomato juice     Milk products: Any yogurt with nuts, seeds or berries      Proteins: Tough, fibrous meats with gristle; cooked dried beans, peas or lentils; crunchy peanut butter     Fats and condiments: Pickles, olives, relish, horseradish; jam, marmalade, preserves      Snacks, sweets and drinks: Popcorn, nuts, seeds, granola, coconut, candies made with nuts or seeds; all desserts that contain nuts, seeds, raisins and other dried fruits, coconut, whole grains or bran.                                               1 day before procedure     You can have a light, low-fiber breakfast. But drink only clear liquids after 9 a.m. (see list below).    Drink at least eight to ten 8-ounce glasses of water throughout the day. ? ? ? ? ? ? ? ?    Fill the container of Golytely with a full gallon of warm water. Cover and shake until well mixed. Chill for 3 hours in refrigerator until it is time to drink solution.    Stop taking NSAIDs pain relievers, such as Advil, Ibuprofen, Motrin, etc. You may take Tylenol.    CLEAR LIQUID DIET:  You can have: Do not have:    Water, tea, coffee (no milk or cream)   Soda pop, Gatorade (not red or purple)   Coconut water   Jell-O, Popsicles (no milk or fruit pieces - not red or purple)   Fat-free soup broth or bouillon   Plain hard candy, such as clear life savers (not red or purple)   Clear juices and fruit-flavored drinks, such as apple juice, white grape juice, Hi-C, and Brandon-Aid (not red or purple)  Milk or milk products such as ice cream, malts or shakes, or coffee creamer   Red or purple drinks of any kind such as cranberry juice, grape juice or  Brandon-Aid. Avoid red or purple Jell-O, Popsicles, sorbet, sherbet and candy   Juices with pulp such as orange, grapefruit, pineapple or tomato juice   Cream soups of any kind   Alcohol and beer   Protein drinks or protein powder     Step 1     At 3 PM, take 2 Dulcolax (Bisacodyl) tablets.   At 6 PM, start drinking one 8-ounce glass of Golytely mixture every 15 minutes until the container is HALF empty. Drink each glass quickly. Store the rest in the refrigerator.   Continue to drink clear liquids    Step 2     If you arrive for your procedure BEFORE 11 AM:  At 11 PM, take 2 Dulcolax (Bisacodyl) tablets  At 11 PM, start drinking the other half of the Golytely container. Drink one 8-ounce glass every 15 minutes until the container is empty. Drink each glass quickly.         Reminders While Drinking Laxatives:     After you start drinking the solution, stay near a toilet. You may have watery stools (diarrhea), mild cramping, bloating, and nausea. You may want to use Vaseline on the skin around your anus after each bowel movement or use wet wipes to prevent irritation. Bowel movements will be liquid and dark in color at first and then should turn clear yellow in color. Drinking the prepared solution may make you cold, wearing warm clothing can be helpful.    Some find it helpful to:  For added flavor, include a crystal light lemonade packet in each glass of Golytely, rather than mixing it into the entire prepared mixture.   In between each glass, try sucking on a lemon or a piece of hard candy to help diminish the flavor of the preparation.  Drinking from a straw can help minimize the taste of the prepared mixture.    If you have nausea or vomiting during drinking the solution, rinse your mouth with water and take a 15-30 minute break and then continue drinking solution.     Day of procedure     2 hours before your arrival time stop drinking all liquids, including water.   Do not smoke or swallow anything, including  water or gum for at least 2 hours before your arrival time. This is a safety issue. Your procedure could be cancelled if you do not follow directions.  No chewing tobacco 6 hours prior to procedure arrival time.     You may take your necessary morning medications with sips of water (4 ounces).   Do not take diabetes medicine by mouth until after your exam.  If you have asthma, bring your inhalers.  Please perform your nebulizer treatments and airway clearance therapy in the morning prior to the procedure (if applicable).    Arrive with a responsible adult who can drive you home and stay with you for up to 24 hours. The medications used during the procedure will make you sleepy, so you won't be able to drive yourself home.   You cannot use public transportation, ride-share services, or non-medical taxi services without a responsible caregiver. Medical transport services are okay, but a caregiver must be there to receive you at your destination.  Please check in with your  when you arrive. Drivers should stay on campus.    Expect to be at the procedure center for about 1.5-2.5 hours.    Do not wear jewelry (i.e. earrings, rings, necklaces, watches, etc.). Leave your purse, billfold, credit cards, and other valuables at home.      Bring insurance card and ID.       Answers to Commonly Asked Questions     How soon can I eat after the procedure?  You may resume your normal diet when you feel ready, unless advised otherwise by the doctor performing your procedure. We recommend starting with a light meal.   Do not drink alcohol for 24 hours after your procedure.  You may resume normal activities (work, exercise, etc.) after 24 hours.    How will I feel after the procedure?  It is normal to feel bloated and gassy after your procedure. Walking will help move the air through your colon. You can take non-aspirin pain relievers that contain acetaminophen (Tylenol).  If you are having sedation, we require a responsible  adult to take you home for your safety. The sedation medicines used to relax you during the procedure can impair your judgement and reaction time, and make you forgetful and possibly a little unsteady.  Do not drive, make any important decisions, or sign any legal documents for 24 hours after your procedure.    When will I get my test results?  You should have your procedure results and any lab results (if applicable) by letter, MyChart message, or phone call within 2 weeks. If you have any questions, please call the doctor that referred you for the procedure.    How do I know if my colon is cleaned out?   After completing the bowel prep, your bowel movements should be all liquid and yellow. Your bowel movements will look similar to urine in the toilet. If there are pieces of stool (poop) in the toilet, or if you can't see to the bottom of the toilet, please call our office for advice. Call [phone] and ask to speak with a nurse.    Why is the Golytely bowel prep taken in several steps?   The stool is flushed out by a large wave of fluid going through the colon. Just sipping a large volume of the solution will not achieve the desired result. Studies have shown that two smaller waves (or more in some cases) are better than one large one.      What if I need to cancel or reschedule my procedure?  Contact our endoscopy scheduling team at [phone], option 2. Monday through Friday, 7:00am-5:00pm.

## 2024-07-16 ENCOUNTER — OFFICE VISIT (OUTPATIENT)
Dept: OPHTHALMOLOGY | Facility: CLINIC | Age: 74
End: 2024-07-16
Payer: COMMERCIAL

## 2024-07-16 ENCOUNTER — PRE VISIT (OUTPATIENT)
Dept: OPHTHALMOLOGY | Facility: CLINIC | Age: 74
End: 2024-07-16

## 2024-07-16 DIAGNOSIS — H35.371 EPIRETINAL MEMBRANE (ERM) OF RIGHT EYE: ICD-10-CM

## 2024-07-16 DIAGNOSIS — H25.813 COMBINED FORMS OF AGE-RELATED CATARACT OF BOTH EYES: ICD-10-CM

## 2024-07-16 DIAGNOSIS — H25.813 COMBINED FORMS OF AGE-RELATED CATARACT OF BOTH EYES: Primary | ICD-10-CM

## 2024-07-16 DIAGNOSIS — H18.522 ANTERIOR BASEMENT MEMBRANE DYSTROPHY OF LEFT EYE: ICD-10-CM

## 2024-07-16 DIAGNOSIS — H52.03 HYPERMETROPIA OF BOTH EYES: ICD-10-CM

## 2024-07-16 PROCEDURE — 76519 ECHO EXAM OF EYE: CPT | Performed by: OPHTHALMOLOGY

## 2024-07-16 PROCEDURE — 99204 OFFICE O/P NEW MOD 45 MIN: CPT | Performed by: OPHTHALMOLOGY

## 2024-07-16 PROCEDURE — 92134 CPTRZ OPH DX IMG PST SGM RTA: CPT | Performed by: OPHTHALMOLOGY

## 2024-07-16 RX ORDER — BISACODYL 5 MG/1
TABLET, DELAYED RELEASE ORAL
Qty: 4 TABLET | Refills: 0 | Status: SHIPPED | OUTPATIENT
Start: 2024-07-16

## 2024-07-16 ASSESSMENT — VISUAL ACUITY
OS_BAT_HIGH: 20/70-1
OD_CC+: +3
OD_BAT_LOW: 20/40-2
METHOD: SNELLEN - LINEAR
OD_CC: 20/30
OS_CC+: +1
OD_BAT_HIGH: 20/70-1
CORRECTION_TYPE: GLASSES
OS_CC: 20/25
OS_BAT_LOW: 20/30-2

## 2024-07-16 ASSESSMENT — REFRACTION_MANIFEST
OD_AXIS: 071
OS_AXIS: 126
OS_SPHERE: +2.00
OD_SPHERE: +2.00
OD_ADD: +2.50
OS_CYLINDER: +0.25
OD_CYLINDER: +0.25
OS_ADD: +2.50

## 2024-07-16 ASSESSMENT — CUP TO DISC RATIO
OS_RATIO: 0.4
OD_RATIO: 0.45

## 2024-07-16 ASSESSMENT — SLIT LAMP EXAM - LIDS
COMMENTS: DERMATOCHALASIS, 1+ MGD
COMMENTS: DERMATOCHALASIS, 1+ MGD

## 2024-07-16 ASSESSMENT — CONF VISUAL FIELD
OD_INFERIOR_NASAL_RESTRICTION: 0
OS_INFERIOR_NASAL_RESTRICTION: 0
OD_SUPERIOR_TEMPORAL_RESTRICTION: 0
OS_INFERIOR_TEMPORAL_RESTRICTION: 0
OD_NORMAL: 1
OD_SUPERIOR_NASAL_RESTRICTION: 0
OS_SUPERIOR_NASAL_RESTRICTION: 0
OS_NORMAL: 1
METHOD: COUNTING FINGERS
OD_INFERIOR_TEMPORAL_RESTRICTION: 0
OS_SUPERIOR_TEMPORAL_RESTRICTION: 0

## 2024-07-16 ASSESSMENT — REFRACTION_WEARINGRX
OD_ADD: +2.50
OS_AXIS: 120
OD_AXIS: 060
OS_SPHERE: +1.75
OS_ADD: +2.50
OD_CYLINDER: +0.25
OS_CYLINDER: +0.25
OD_SPHERE: +2.00

## 2024-07-16 ASSESSMENT — EXTERNAL EXAM - RIGHT EYE: OD_EXAM: NORMAL

## 2024-07-16 ASSESSMENT — TONOMETRY
OD_IOP_MMHG: 19
IOP_METHOD: TONOPEN
OS_IOP_MMHG: 17

## 2024-07-16 ASSESSMENT — EXTERNAL EXAM - LEFT EYE: OS_EXAM: NORMAL

## 2024-07-16 NOTE — PROGRESS NOTES
Preoperative Evaluation  [facility]  [address] [place], [place] [address] [place]  [address]  [facility] [address]  Phone: [phone]  Fax: [phone]  Primary Provider: Tone Huffman MD  Pre-op Performing Provider: Tone Huffman MD  Jun 26, 2024           Fax number for surgical facility:     Assessment & Plan     The proposed surgical procedure is considered LOW risk.    Pre-op examination  cleared  - Comprehensive metabolic panel (BMP + Alb, Alk Phos, ALT, AST, Total. Bili, TP); Future  - Comprehensive metabolic panel (BMP + Alb, Alk Phos, ALT, AST, Total. Bili, TP)    Rash  Improved   - CBC with platelets; Future  - CBC with platelets    Non-toxic multinodular goiter  due  - TSH with free T4 reflex; Future  - TSH with free T4 reflex    Age-related osteoporosis without current pathological fracture  due    CARDIOVASCULAR SCREENING; LDL GOAL LESS THAN 130  due  - Lipid panel reflex to direct LDL Fasting; Future  - Lipid panel reflex to direct LDL Fasting    Screen for colon cancer  due  - Colonoscopy Screening  Referral; Future     - No identified additional risk factors other than previously addressed    Antiplatelet or Anticoagulation Medication Instructions   - Patient is on no antiplatelet or anticoagulation medications.    Additional Medication Instructions  Patient is on no additional chronic medications    Recommendation  Approval given to proceed with proposed procedure, without further diagnostic evaluation.    Moises Hanley is a 73 year old, presenting for the following:  Wellness Visit    HPI related to upcoming procedure: Age-related bilateral cataracts, mature         No data to display              Health Care Directive  Patient does not have a Health Care Directive or Living Will: Discussed advance care planning with patient; however, patient declined at this time.    Preoperative Review of    reviewed - no record of controlled substances prescribed.    Status of  Chronic Conditions:  See problem list for active medical problems.  Problems all longstanding and stable, except as noted/documented.  See ROS for pertinent symptoms related to these conditions.    Patient Active Problem List    Diagnosis Date Noted    CARDIOVASCULAR SCREENING; LDL GOAL LESS THAN 130 06/26/2024     Priority: Medium    Arthritis of both hands 03/22/2022     Priority: Medium    Senile osteoporosis 04/18/2018     Priority: Medium    Osteoporosis 04/25/2017     Priority: Medium     L1-L3 T-3.5 2017, newly diagnosed      Non-toxic multinodular goiter 10/12/2011     Priority: Medium      Past Medical History:   Diagnosis Date    Thyroid disease     Vertigo      Past Surgical History:   Procedure Laterality Date    APPENDECTOMY  09/17/12    COLONOSCOPY  10/2007    repeat 10 yrs    COLONOSCOPY  6/2014    repeat 10 yrs     Current Outpatient Medications   Medication Sig Dispense Refill    Calcium Citrate-Vitamin D (CALCIUM CITRATE PETITE/VIT D PO)       estradiol (ESTRACE) 1 MG tablet Take 0.5 tablets (0.5 mg) by mouth once a week 6 tablet 3    multivitamin w/minerals (THERA-VIT-M) tablet Take 1 tablet by mouth daily      VITAMIN D, CHOLECALCIFEROL, PO Take 1,000 Units by mouth daily      bisacodyl (DULCOLAX) 5 MG EC tablet Take 2 tablets at 3 pm the day before your procedure. If your procedure is before 11 am, take 2 additional tablets at 11 pm. If your procedure is after 11 am, take 2 additional tablets at 6 am. For additional instructions refer to your colonoscopy prep instructions. 4 tablet 0    polyethylene glycol (GOLYTELY) 236 g suspension The night before the exam at 6 pm drink an 8-ounce glass every 15 minutes until the jug is half empty. If you arrive before 11 AM: Drink the other half of the Golytely jug at 11 PM night before procedure. If you arrive after 11 AM: Drink the other half of the Golytely jug at 6 AM day of procedure. For additional instructions refer to your colonoscopy prep  "instructions. 4000 mL 0       Allergies   Allergen Reactions    Acetaminophen      tylenol    Aspirin [Dihydroxyaluminum Aminoacetate]     Seasonal Allergies         Social History     Tobacco Use    Smoking status: Never    Smokeless tobacco: Never   Substance Use Topics    Alcohol use: No     History   Drug Use No             Review of Systems  Constitutional, neuro, ENT, endocrine, pulmonary, cardiac, gastrointestinal, genitourinary, musculoskeletal, integument and psychiatric systems are negative, except as otherwise noted.    Objective    /76 (BP Location: Left arm, Patient Position: Sitting, Cuff Size: Adult Small)   Pulse 62   Temp 97.8  F (36.6  C) (Temporal)   Ht 1.472 m (4' 9.95\")   Wt 35.8 kg (79 lb)   LMP  (LMP Unknown)   SpO2 100%   BMI 16.54 kg/m     Estimated body mass index is 16.54 kg/m  as calculated from the following:    Height as of this encounter: 1.472 m (4' 9.95\").    Weight as of this encounter: 35.8 kg (79 lb).  Physical Exam  GENERAL: alert and no distress  EYES: Eyes grossly normal to inspection, PERRL and conjunctivae and sclerae normal  HENT: ear canals and TM's normal, nose and mouth without ulcers or lesions  NECK: no adenopathy, no asymmetry, masses, or scars  RESP: lungs clear to auscultation - no rales, rhonchi or wheezes  CV: regular rate and rhythm, normal S1 S2, no S3 or S4, no murmur, click or rub, no peripheral edema  ABDOMEN: soft, nontender, no hepatosplenomegaly, no masses and bowel sounds normal  MS: no gross musculoskeletal defects noted, no edema  SKIN: no suspicious lesions or rashes  NEURO: Normal strength and tone, mentation intact and speech normal  PSYCH: mentation appears normal, affect normal/bright    Recent Labs   Lab Test 06/26/24  0919   HGB 12.1         POTASSIUM 3.9   CR 0.91        Diagnostics  No labs were ordered during this visit.   No EKG required for low risk surgery (cataract, skin procedure, breast biopsy, etc).    Revised " Cardiac Risk Index (RCRI)  The patient has the following serious cardiovascular risks for perioperative complications:   - No serious cardiac risks = 0 points     RCRI Interpretation: 1 point: Class II (low risk - 0.9% complication rate)         Signed Electronically by: Tone Huffman MD  Copy of this evaluation report is provided to requesting physician.

## 2024-07-16 NOTE — NURSING NOTE
Chief Complaints and History of Present Illnesses   Patient presents with    Cataract Evaluation     Chief Complaint(s) and History of Present Illness(es)       Cataract Evaluation              Laterality: both eyes    Associated symptoms: a need for brighter lights.  Negative for blurred vision, glare and haloes    Activities affected: reading              Comments    Talon SILVA Son is being seen for a consult today by the request of Dr. Lee for possible cataract evaluation. Patient states uses a magnifying glass to read small print for hobby and at work. Enlarges font size on screen. Patient prefers older rx glasses and states can see clearly for distance with them.    Sakina Villegas on 7/16/2024, at 10:34 AM

## 2024-07-16 NOTE — TELEPHONE ENCOUNTER
Pre assessment completed for upcoming procedure.   (Please see previous telephone encounter notes for complete details)      Procedure details:    Arrival time and facility location reviewed.    Pre op exam needed? N/A    Designated  policy reviewed. Instructed to have someone stay 6  hours post procedure.       Medication review:    Medications reviewed. Please see supporting documentation below. Holding recommendations discussed (if applicable).       Prep for procedure:     Procedure prep instructions reviewed.  Patient requesting Golytely prep. Bowel prep has been sent to [place], [place] - [address]    Updated prep instructions sent via [employer] and letter per patient's request.       Any additional information needed:  N/A      Patient  verbalized understanding and had no questions or concerns at this time.      Jill Cao RN  Endoscopy Procedure Pre Assessment   [phone] option 4

## 2024-07-16 NOTE — PROGRESS NOTES
LALO SILVA Son is a 73 year old female here for cataract evaluation, last seen by Dr. Lee. Complains of blurred, cloudy vision in the right eye more than left eye , making it difficult to see tv and see signs far away.  Uses a magnifier to do hobbies at near.      PMH:   Past Medical History:   Diagnosis Date    Thyroid disease     Vertigo       POH: Glasses for hypermetropia/presbyopia, mixed type cataracts, no surgery, no trauma  Oc Meds: none  FH: Denies any glaucoma, age related macular degeneration, or other known eye diseases .         Assessment & Plan      (H25.813) Mixed type age-related cataract, both eyes  (primary encounter diagnosis)  Comment:   The patient was advised that significant symptoms of blurred vision were primarily secondary to cataracts.  The risks, benefits, and alternatives to cataract surgery with intraocular lens implant were discussed at length including mild epiretinal membrane possible effect on visual acuity after cataract surgery.  The patient opted to plan cataract surgery in the near future.  Pre-operative measurements were taken with the IOL Master to plan appropriate lens power and lens implant choices were discussed along with refractive aim.  The patient was advised of the necessity for a pre-operative physical with their primary physician.  The patient was also asked to arrange to have someone at home when returning from surgery.     Plan:  Phacoemulsification with intraocular lens implant.  We discussed the risks and benefits of cataract surgery in both eyes and informed consent was obtained.        Surgical plan:  Best corrected visual acuity today is 20/30 od 20/25 left eye , bat 20/70  1  +NSC    3+ACC    Special equipment/needs:    Anesthesia: MAC /Topical  Able to lie flat:  Yes  Dilation: Good   7mm  Alpha blocker/Flomax: No  Malyugen/Iris expansion: Not needed  Anticoagulants: No  Guttata: No  Diabetes: No  Pseudoexfoliation: No pseudoexfoliation  Trauma:  No  Trypan Blue: Yes  Refractive goal: plano    Cylinder:  no toric     hypermetropia, bilateral - Both Eyes   Comment: visual acuity limited by cataracts   Plan: hold on prescription until after cataract extraction     (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 Monitor annually.    Epiretinal membrane right eye   Comment: Not likely very visually significant, discussed with patient may need retinal evaluation after cataract extraction   Plan:   Oct macula both eyes epiretinal membrane right eye only , no edema- see report.  Monitor annually, nsaid post cataract surgery.  -----------------------------------------------------------------------------------        Patient disposition:   Return in about 1 month (around 8/16/2024) for post-op cataract sx, POD  1  OD .      Complete documentation of historical and exam elements from today's encounter can be found in the full encounter summary report (not reduplicated in this progress note). I personally obtained the chief complaint(s) and history of present illness.  I have confirmed and edited as necessary the CC, HPI, PMH/PSH, social history, FMH, ROS, and exam/neuro findings as obtained by the technician or others. I have examined this patient myself and I personally viewed the image(s) and studies listed above and the documentation reflects my findings and interpretation.  I formulated and edited as necessary the assessment and plan and discussed the findings and management plan with the patient and family.     Prabha Kim MD

## 2024-07-17 ENCOUNTER — HOSPITAL ENCOUNTER (OUTPATIENT)
Facility: AMBULATORY SURGERY CENTER | Age: 74
End: 2024-07-17
Attending: OPHTHALMOLOGY
Payer: COMMERCIAL

## 2024-07-17 ENCOUNTER — TELEPHONE (OUTPATIENT)
Dept: OPHTHALMOLOGY | Facility: CLINIC | Age: 74
End: 2024-07-17
Payer: COMMERCIAL

## 2024-07-17 PROBLEM — H25.813 COMBINED FORMS OF AGE-RELATED CATARACT OF BOTH EYES: Status: ACTIVE | Noted: 2024-07-16

## 2024-07-17 NOTE — TELEPHONE ENCOUNTER
Called patient to schedule surgery with     Spoke with: Talon    Date of Surgery: 9/16 (Right) and 9/30 (Left)     Patient aware of approximate arrival time: No aware RN will reach out     Location of surgery: [facility] (Right) and [facility] (Left)     Pre-Op H&P: [facility]    Informed patient that they need to call to schedule pre-op H&P with PCP within 30 days of surgery date: Yes      Post-Op Appt Dates:     9/17 - 1220  9/24 - 1320  10/1 - 1220  10/22 - 1220        Discussed with patient pre-op RN will call 2-3 days prior to surgery with arrival time and instructions:  Yes       Standard Surgery Packet Sent: Yes 07/17/24  via Mail      Surgical Soap Discussed with Patient: Yes  - Received:  Local Pharmacy       Additional Information Sent in Packet: Post-op Appointment Itinerary      Informed patient that they will need an adult  to bring patient home from surgery: Yes  : aware  is needed         Additional Comments:        All patients questions were answered and was instructed to review surgical packet and call back [phone] with any questions or concerns.       Anna C. Schoenecker on 7/17/2024 at 11:08 AM

## 2024-07-23 ENCOUNTER — ANESTHESIA EVENT (OUTPATIENT)
Dept: SURGERY | Facility: AMBULATORY SURGERY CENTER | Age: 74
End: 2024-07-23
Payer: COMMERCIAL

## 2024-07-23 NOTE — ANESTHESIA PREPROCEDURE EVALUATION
Anesthesia Pre-Procedure Evaluation    Patient: Talon SILVA Son   MRN: [id number] : 1950        Procedure : Procedure(s):  Colonoscopy          Past Medical History:   Diagnosis Date    Thyroid disease     Vertigo       Past Surgical History:   Procedure Laterality Date    APPENDECTOMY  12    COLONOSCOPY  10/2007    repeat 10 yrs    COLONOSCOPY  2014    repeat 10 yrs      Allergies   Allergen Reactions    Acetaminophen      tylenol    Aspirin [Dihydroxyaluminum Aminoacetate]     Seasonal Allergies       Social History     Tobacco Use    Smoking status: Never    Smokeless tobacco: Never   Substance Use Topics    Alcohol use: No      Wt Readings from Last 1 Encounters:   24 35.8 kg (79 lb)        Anesthesia Evaluation            ROS/MED HX  ENT/Pulmonary:       Neurologic:       Cardiovascular:       METS/Exercise Tolerance:     Hematologic:       Musculoskeletal:   (+)  arthritis,             GI/Hepatic:       Renal/Genitourinary:       Endo:     (+)          thyroid problem,            Psychiatric/Substance Use:       Infectious Disease:       Malignancy:       Other:            Physical Exam    Airway        Mallampati: II       Respiratory Devices and Support         Dental       (+) Minor Abnormalities - some fillings, tiny chips      Cardiovascular          Rhythm and rate: regular     Pulmonary                   OUTSIDE LABS:  CBC:   Lab Results   Component Value Date    WBC 2.9 (L) 2024    WBC 3.4 (L) 2023    HGB 12.1 2024    HGB 11.6 (L) 2023    HCT 35.9 2024    HCT 34.7 (L) 2023     2024     2023     BMP:   Lab Results   Component Value Date     2024     2023    POTASSIUM 3.9 2024    POTASSIUM 4.3 2023    CHLORIDE 105 2024    CHLORIDE 104 2023    CO2 26 2024    CO2 27 2023    BUN 24.7 (H) 2024    BUN 23.1 (H) 2023    CR 0.91 2024    CR 0.83 2023     "GLC 93 06/26/2024    GLC 89 06/23/2023     COAGS:   Lab Results   Component Value Date    PTT 30 04/29/2008    INR 0.91 04/29/2008     POC: No results found for: \"BGM\", \"HCG\", \"HCGS\"  HEPATIC:   Lab Results   Component Value Date    ALBUMIN 4.2 06/26/2024    PROTTOTAL 7.4 06/26/2024    ALT 23 06/26/2024    AST 31 06/26/2024    ALKPHOS 49 06/26/2024    BILITOTAL 0.5 06/26/2024     OTHER:   Lab Results   Component Value Date    LACT 1.3 10/26/2013    A1C 5.5 10/27/2016    SHANAE 9.5 06/26/2024    PHOS 3.6 08/25/2009    LIPASE 154 10/26/2013    TSH 0.38 06/26/2024    T4 1.05 08/10/2022    CRP <2.9 06/27/2017    SED 31 (H) 06/27/2017       Anesthesia Plan    ASA Status:  2    NPO Status:  NPO Appropriate    Anesthesia Type: MAC.     - Reason for MAC: immobility needed              Consents    Anesthesia Plan(s) and associated risks, benefits, and realistic alternatives discussed. Questions answered and patient/representative(s) expressed understanding.     - Discussed:     - Discussed with:  Patient            Postoperative Care            Comments:               Jack Campbell MD    I have reviewed the pertinent notes and labs in the chart from the past 30 days and (re)examined the patient.  Any updates or changes from those notes are reflected in this note.              # Cachexia: Estimated body mass index is 16.54 kg/m  as calculated from the following:    Height as of 6/26/24: 1.472 m (4' 9.95\").    Weight as of 6/26/24: 35.8 kg (79 lb).      "

## 2024-07-24 ENCOUNTER — HOSPITAL ENCOUNTER (OUTPATIENT)
Facility: AMBULATORY SURGERY CENTER | Age: 74
Discharge: HOME | End: 2024-07-24
Attending: INTERNAL MEDICINE
Payer: COMMERCIAL

## 2024-07-24 ENCOUNTER — ANESTHESIA (OUTPATIENT)
Dept: SURGERY | Facility: AMBULATORY SURGERY CENTER | Age: 74
End: 2024-07-24
Payer: COMMERCIAL

## 2024-07-24 VITALS
TEMPERATURE: 96.8 F | RESPIRATION RATE: 12 BRPM | HEIGHT: 58 IN | DIASTOLIC BLOOD PRESSURE: 86 MMHG | OXYGEN SATURATION: 100 % | WEIGHT: 79 LBS | HEART RATE: 56 BPM | BODY MASS INDEX: 16.58 KG/M2 | SYSTOLIC BLOOD PRESSURE: 106 MMHG

## 2024-07-24 VITALS — HEART RATE: 53 BPM

## 2024-07-24 LAB — COLONOSCOPY: NORMAL

## 2024-07-24 PROCEDURE — 45378 DIAGNOSTIC COLONOSCOPY: CPT | Performed by: ANESTHESIOLOGY

## 2024-07-24 PROCEDURE — 45378 DIAGNOSTIC COLONOSCOPY: CPT | Performed by: NURSE ANESTHETIST, CERTIFIED REGISTERED

## 2024-07-24 PROCEDURE — 99100 ANES PT EXTEME AGE<1 YR&>70: CPT | Performed by: NURSE ANESTHETIST, CERTIFIED REGISTERED

## 2024-07-24 PROCEDURE — 99100 ANES PT EXTEME AGE<1 YR&>70: CPT | Performed by: ANESTHESIOLOGY

## 2024-07-24 PROCEDURE — 45378 DIAGNOSTIC COLONOSCOPY: CPT | Mod: 33 | Performed by: INTERNAL MEDICINE

## 2024-07-24 RX ORDER — ONDANSETRON 2 MG/ML
4 INJECTION INTRAMUSCULAR; INTRAVENOUS
Status: DISCONTINUED | OUTPATIENT
Start: 2024-07-24 | End: 2024-07-24 | Stop reason: HOSPADM

## 2024-07-24 RX ORDER — LIDOCAINE HYDROCHLORIDE 20 MG/ML
INJECTION, SOLUTION INFILTRATION; PERINEURAL PRN
Status: DISCONTINUED | OUTPATIENT
Start: 2024-07-24 | End: 2024-07-24

## 2024-07-24 RX ORDER — NALOXONE HYDROCHLORIDE 0.4 MG/ML
0.4 INJECTION, SOLUTION INTRAMUSCULAR; INTRAVENOUS; SUBCUTANEOUS
Status: DISCONTINUED | OUTPATIENT
Start: 2024-07-24 | End: 2024-07-25 | Stop reason: HOSPADM

## 2024-07-24 RX ORDER — SODIUM CHLORIDE, SODIUM LACTATE, POTASSIUM CHLORIDE, CALCIUM CHLORIDE 600; 310; 30; 20 MG/100ML; MG/100ML; MG/100ML; MG/100ML
INJECTION, SOLUTION INTRAVENOUS CONTINUOUS PRN
Status: DISCONTINUED | OUTPATIENT
Start: 2024-07-24 | End: 2024-07-24

## 2024-07-24 RX ORDER — NALOXONE HYDROCHLORIDE 0.4 MG/ML
0.2 INJECTION, SOLUTION INTRAMUSCULAR; INTRAVENOUS; SUBCUTANEOUS
Status: DISCONTINUED | OUTPATIENT
Start: 2024-07-24 | End: 2024-07-25 | Stop reason: HOSPADM

## 2024-07-24 RX ORDER — LIDOCAINE 40 MG/G
CREAM TOPICAL
Status: DISCONTINUED | OUTPATIENT
Start: 2024-07-24 | End: 2024-07-24 | Stop reason: HOSPADM

## 2024-07-24 RX ORDER — FLUMAZENIL 0.1 MG/ML
0.2 INJECTION, SOLUTION INTRAVENOUS
Status: ACTIVE | OUTPATIENT
Start: 2024-07-24 | End: 2024-07-24

## 2024-07-24 RX ORDER — ONDANSETRON 4 MG/1
4 TABLET, ORALLY DISINTEGRATING ORAL EVERY 6 HOURS PRN
Status: DISCONTINUED | OUTPATIENT
Start: 2024-07-24 | End: 2024-07-25 | Stop reason: HOSPADM

## 2024-07-24 RX ORDER — PROCHLORPERAZINE MALEATE 5 MG
5 TABLET ORAL EVERY 6 HOURS PRN
Status: DISCONTINUED | OUTPATIENT
Start: 2024-07-24 | End: 2024-07-25 | Stop reason: HOSPADM

## 2024-07-24 RX ORDER — ONDANSETRON 2 MG/ML
4 INJECTION INTRAMUSCULAR; INTRAVENOUS EVERY 6 HOURS PRN
Status: DISCONTINUED | OUTPATIENT
Start: 2024-07-24 | End: 2024-07-25 | Stop reason: HOSPADM

## 2024-07-24 RX ORDER — PROPOFOL 10 MG/ML
INJECTION, EMULSION INTRAVENOUS CONTINUOUS PRN
Status: DISCONTINUED | OUTPATIENT
Start: 2024-07-24 | End: 2024-07-24

## 2024-07-24 RX ADMIN — LIDOCAINE HYDROCHLORIDE 60 MG: 20 INJECTION, SOLUTION INFILTRATION; PERINEURAL at 10:54

## 2024-07-24 RX ADMIN — PROPOFOL 300 MCG/KG/MIN: 10 INJECTION, EMULSION INTRAVENOUS at 10:55

## 2024-07-24 RX ADMIN — SODIUM CHLORIDE, SODIUM LACTATE, POTASSIUM CHLORIDE, CALCIUM CHLORIDE: 600; 310; 30; 20 INJECTION, SOLUTION INTRAVENOUS at 10:44

## 2024-07-24 NOTE — ANESTHESIA CARE TRANSFER NOTE
Patient: Talon Myers    Procedure: Procedure(s):  Colonoscopy       Diagnosis: Screen for colon cancer [Z12.11]  Diagnosis Additional Information: No value filed.    Anesthesia Type:   MAC     Note:    Oropharynx: oropharynx clear of all foreign objects and spontaneously breathing  Level of Consciousness: awake  Oxygen Supplementation: room air    Independent Airway: airway patency satisfactory and stable  Dentition: dentition unchanged  Vital Signs Stable: post-procedure vital signs reviewed and stable  Report to RN Given: handoff report given  Patient transferred to: Phase II  Comments: Report to Phase II RN. Resps easy and reg.   Handoff Report: Identifed the Patient, Identified the Reponsible Provider, Reviewed the pertinent medical history, Discussed the surgical course, Reviewed Intra-OP anesthesia mangement and issues during anesthesia, Set expectations for post-procedure period and Allowed opportunity for questions and acknowledgement of understanding      Vitals:  Vitals Value Taken Time   /64 07/24/24  1125   Temp 36  C (96.8  F) 07/24/24 1125   Pulse 53 07/24/24 1125   Resp 12 07/24/24 1125   SpO2 99 % 07/24/24 1125       Electronically Signed By: JOSE Dixon CRNA  July 24, 2024  11:31 AM

## 2024-07-24 NOTE — ANESTHESIA POSTPROCEDURE EVALUATION
Patient: Talon Myers    Procedure: Procedure(s):  Colonoscopy       Anesthesia Type:  MAC    Note:  Disposition: Outpatient   Postop Pain Control: Uneventful            Sign Out: Well controlled pain   PONV: No   Neuro/Psych: Uneventful            Sign Out: Acceptable/Baseline neuro status   Airway/Respiratory: Uneventful            Sign Out: Acceptable/Baseline resp. status   CV/Hemodynamics: Uneventful            Sign Out: Acceptable CV status; No obvious hypovolemia; No obvious fluid overload   Other NRE: NONE   DID A NON-ROUTINE EVENT OCCUR?            Last vitals:  Vitals Value Taken Time   /86 07/24/24 1140   Temp 36  C (96.8  F) 07/24/24 1125   Pulse 56 07/24/24 1140   Resp 12 07/24/24 1140   SpO2 100 % 07/24/24 1140       Electronically Signed By: Jack Campbell MD  July 24, 2024  12:14 PM

## 2024-07-24 NOTE — H&P
Talon SILVA Son  [id number]  female  73 year old      Reason for procedure/surgery: screening, last colonoscopy 2014    Patient Active Problem List   Diagnosis    Non-toxic multinodular goiter    Osteoporosis    Senile osteoporosis    Arthritis of both hands    CARDIOVASCULAR SCREENING; LDL GOAL LESS THAN 130    Combined forms of age-related cataract of both eyes       Past Surgical History:    Past Surgical History:   Procedure Laterality Date    APPENDECTOMY  09/17/12    COLONOSCOPY  10/2007    repeat 10 yrs    COLONOSCOPY  6/2014    repeat 10 yrs       Past Medical History:   Past Medical History:   Diagnosis Date    Thyroid disease     Vertigo        Social History:   Social History     Tobacco Use    Smoking status: Never    Smokeless tobacco: Never   Substance Use Topics    Alcohol use: No       Family History:   Family History   Problem Relation Age of Onset    Heart Disease Mother     Glaucoma No family hx of     Macular Degeneration No family hx of        Allergies:   Allergies   Allergen Reactions    Acetaminophen      tylenol    Aspirin [Dihydroxyaluminum Aminoacetate]     Seasonal Allergies        Active Medications:   Current Outpatient Medications   Medication Sig Dispense Refill    bisacodyl (DULCOLAX) 5 MG EC tablet Take 2 tablets at 3 pm the day before your procedure. If your procedure is before 11 am, take 2 additional tablets at 11 pm. If your procedure is after 11 am, take 2 additional tablets at 6 am. For additional instructions refer to your colonoscopy prep instructions. 4 tablet 0    Calcium Citrate-Vitamin D (CALCIUM CITRATE PETITE/VIT D PO)       estradiol (ESTRACE) 1 MG tablet Take 0.5 tablets (0.5 mg) by mouth once a week 6 tablet 3    multivitamin w/minerals (THERA-VIT-M) tablet Take 1 tablet by mouth daily      polyethylene glycol (GOLYTELY) 236 g suspension The night before the exam at 6 pm drink an 8-ounce glass every 15 minutes until the jug is half empty. If you arrive before 11 AM:  Drink the other half of the Golytely jug at 11 PM night before procedure. If you arrive after 11 AM: Drink the other half of the Golytely jug at 6 AM day of procedure. For additional instructions refer to your colonoscopy prep instructions. 4000 mL 0    VITAMIN D, CHOLECALCIFEROL, PO Take 1,000 Units by mouth daily         Systemic Review:   CONSTITUTIONAL: NEGATIVE for fever, chills, change in weight  ENT/MOUTH: NEGATIVE for ear, mouth and throat problems  RESP: NEGATIVE for significant cough or SOB  CV: NEGATIVE for chest pain, palpitations or peripheral edema    Physical Examination:   Vital Signs: LMP  (LMP Unknown)   GENERAL: healthy, alert and no distress  NECK: no adenopathy, no asymmetry, masses, or scars  RESP: lungs clear to auscultation - no rales, rhonchi or wheezes  CV: regular rate and rhythm, normal S1 S2, no S3 or S4, no murmur, click or rub, no peripheral edema and peripheral pulses strong  ABDOMEN: soft, nontender, no hepatosplenomegaly, no masses and bowel sounds normal  MS: no gross musculoskeletal defects noted, no edema    Plan: Appropriate to proceed as scheduled.      Louann Corado MD  7/23/2024    PCP:  Tone Huffman

## 2024-08-07 NOTE — TELEPHONE ENCOUNTER
Patient called to cancel 9/16 and 9/30 procedure with Dr. Kim. Patient stated that they do not have the money for deductible or out of pocket expenses. Pt said they would call back next year when they have saved to reschedule. Writer offered to reschedule at this time, but pt stated they would call. Juliana Hernandez on 8/7/2024 at 9:55 AM

## 2024-08-12 ENCOUNTER — ANCILLARY PROCEDURE (OUTPATIENT)
Dept: BONE DENSITY | Facility: CLINIC | Age: 74
End: 2024-08-12
Attending: INTERNAL MEDICINE
Payer: COMMERCIAL

## 2024-08-12 DIAGNOSIS — M81.0 AGE RELATED OSTEOPOROSIS, UNSPECIFIED PATHOLOGICAL FRACTURE PRESENCE: ICD-10-CM

## 2024-08-12 PROCEDURE — 77080 DXA BONE DENSITY AXIAL: CPT | Performed by: INTERNAL MEDICINE

## 2024-08-28 ENCOUNTER — OFFICE VISIT (OUTPATIENT)
Dept: ENDOCRINOLOGY | Facility: CLINIC | Age: 74
End: 2024-08-28
Payer: COMMERCIAL

## 2024-08-28 VITALS
HEART RATE: 55 BPM | HEIGHT: 58 IN | OXYGEN SATURATION: 99 % | BODY MASS INDEX: 17.38 KG/M2 | WEIGHT: 82.8 LBS | SYSTOLIC BLOOD PRESSURE: 142 MMHG | DIASTOLIC BLOOD PRESSURE: 82 MMHG

## 2024-08-28 DIAGNOSIS — M81.0 AGE RELATED OSTEOPOROSIS, UNSPECIFIED PATHOLOGICAL FRACTURE PRESENCE: Primary | ICD-10-CM

## 2024-08-28 DIAGNOSIS — E04.1 THYROID NODULE: ICD-10-CM

## 2024-08-28 PROCEDURE — 99214 OFFICE O/P EST MOD 30 MIN: CPT | Performed by: INTERNAL MEDICINE

## 2024-08-28 RX ORDER — ESTRADIOL 1 MG/1
0.5 TABLET ORAL
Qty: 12 TABLET | Refills: 3 | Status: SHIPPED | OUTPATIENT
Start: 2024-08-29

## 2024-08-28 ASSESSMENT — PAIN SCALES - GENERAL: PAINLEVEL: NO PAIN (0)

## 2024-08-28 NOTE — PROGRESS NOTES
"Chief Complaint   Patient presents with    Thyroid Problem    Osteoporosis     Vital signs:      BP: (!) 150/89 Pulse: 55     SpO2: 99 %     Height: 147.3 cm (4' 10\") Weight: 37.6 kg (82 lb 12.8 oz)  Estimated body mass index is 17.31 kg/m  as calculated from the following:    Height as of this encounter: 1.473 m (4' 10\").    Weight as of this encounter: 37.6 kg (82 lb 12.8 oz).                                                               -- Endocrine follow up ---         Assessment and Plan:   Ms. Son is a 74 yo female with PMH for osteoporosis (diagnosed with dexa 2017) on calcium/vitamin D, history of multinodular goiter (negative biopsies 2012, 2014) who presents today for follow up.    #Osteoporosis   Diagnosed 4/2017 with DEXA scan, repeat DEXA as noted above. Has been taking citracal petites (2 pills in the morning) for the past year. Was recommended to start IV reclast last year as well as fosamax but was not interested.   Osteoporosis relatively stable T score lumber -3,3 in 8/2022. She does not want to take Fosamax. Currently she is compliant to calcium supplement (citracal 2 tabs daily) and estradiol 0.5 mg weekly and has been doing active life. DXA 8/2024 osteoporosis but stable.      - repeat DEXA summer 2026  - continue citracal 2 tabs and Vit. D supplementation  - Slight increase estrogen 0.5 mg weekly to 0.5 mg twice weekly.     #Multinodular Goiter  Biopsies in 2012 and 2014 (both with nodules from right and left side sampled) - all negative. Patient denies any significant changes in her neck  And no clinical changes since last visit. Thyroid function normal last time checked in 2018.  Ultrasound in 2018 looked stable when compared to prior though did not get follow up that was recommended.     Stable, next imaging 8/2026 together with bone density        - RTC with me in 2 year      30 minutes spent on the date of the encounter doing chart review, history and exam, documentation and further " activities as noted above.    Linda Hodgson MD  Staff Physician  Endocrinology and Metabolism  [facility]  License: [id number]  Pager: [phone]    Interval History as of 8/28/2024 : Patient has been doing well. Medication compliance excellent to citracal and estradiol  . New event includes : no pertinent medical event noted and has been doing well.    Interval History as of 8/23/2023 : Patient has been doing well. Medication compliance   excellent to supplement Ca. New event includes : no pertinent medical event noted .   Brief summary: 70 yo female with PMH for osteoporosis (diagnosed with dexa 2017) on calcium/vitamin D, history of multinodular goiter (negative biopsies 2012, 2014) who presents today for follow up.    Osteoporosis was diagnosed 4/2017 with DEXA scan noted above. Has been taking citracal petites (2 pills in the morning) for the past year. Was recommended to start IV reclast and continue fosamax but patient reported she did not want to do either and was encouraged to continue with her calcium and vit D supplementation with  Weight bearing exercises.     Last DEXA in 2019  Conclusions:  The most negative and valid T-score of -3.4 at the level of the L1 - L3 lumbar spine corresponds with osteoporosis according to WHO criteria for postmenopausal females and men age 50 and over.     For her multinodular goiter, biopsies in 2012 and 2014 (both with nodules from right and left side sampled) - all negative. No repeat thyroid labs since last visit in 2019 when they were WNL. Last US in 2018 looks stable when compared to prior, plan was to follow up with repeat US in 2020 though this was not done.     Interval history:  Patient reports feeling ok today and in general. Denies heat/cold intolerenace, no changes in voice, reports normal appetite and eating ok. No nausea/vomiting, no palpitations, no shortness of breath. No swelling or difficulty swallowing.     For her bone health she is  still taking her Vitamin D and calcium, taken every day. She does a lot of standing at her job but not a lot of exercising.     Review Of Systems  12 point ROS negative unless noted above    Past Medical History  Past Medical History:   Diagnosis Date    Thyroid disease     Vertigo        Past Surgical History  Past Surgical History:   Procedure Laterality Date    APPENDECTOMY  09/17/12    COLONOSCOPY  10/2007    repeat 10 yrs    COLONOSCOPY  6/2014    repeat 10 yrs    COLONOSCOPY N/A 7/24/2024    Procedure: Colonoscopy;  Surgeon: Louann Corado MD;  [place] [facility] [place]        Medications  Current Outpatient Medications   Medication Sig Dispense Refill    Calcium Citrate-Vitamin D (CALCIUM CITRATE PETITE/VIT D PO)       estradiol (ESTRACE) 1 MG tablet Take 0.5 tablets (0.5 mg) by mouth once a week 6 tablet 3    multivitamin w/minerals (THERA-VIT-M) tablet Take 1 tablet by mouth daily      polyethylene glycol (GOLYTELY) 236 g suspension The night before the exam at 6 pm drink an 8-ounce glass every 15 minutes until the jug is half empty. If you arrive before 11 AM: Drink the other half of the Golytely jug at 11 PM night before procedure. If you arrive after 11 AM: Drink the other half of the Golytely jug at 6 AM day of procedure. For additional instructions refer to your colonoscopy prep instructions. 4000 mL 0    VITAMIN D, CHOLECALCIFEROL, PO Take 1,000 Units by mouth daily      bisacodyl (DULCOLAX) 5 MG EC tablet Take 2 tablets at 3 pm the day before your procedure. If your procedure is before 11 am, take 2 additional tablets at 11 pm. If your procedure is after 11 am, take 2 additional tablets at 6 am. For additional instructions refer to your colonoscopy prep instructions. (Patient not taking: Reported on 8/28/2024) 4 tablet 0       Physical Examination:  General appearance: seated comfortably in the chair during assessment. Not in any acute distress  HEENT: PEERLA. Oral cavity is moist and clear. No palpable  thyroid/neck abnormalities,  Lungs: bilateral air entry equal. Clear to auscultation. No rhonchi or crepitations heard  Heart: S1 S2 normal. Pulse: regular rate and rhythm, good volume  Abdomen: soft, nontender, nondistended  Neurological: conscious and oriented. Speech: normal. Moving all four extremities equally  Extremities: no edema noted. Dorsalis pedis 2+ bilaterally. No ulcers noted. No tremor is noted over her outstretched hands  Psychiatric: normal mood and affect. Normal judgment

## 2024-08-28 NOTE — LETTER
"8/28/2024       RE: Taoln SILVA Son  [address]  [facility] [address]     Dear Colleague,    Thank you for referring your patient, Talon SILVA Son, to the [facility]OCR[facility] [place] at [facility]. Please see a copy of my visit note below.    Chief Complaint   Patient presents with     Thyroid Problem     Osteoporosis     Vital signs:      BP: (!) 150/89 Pulse: 55     SpO2: 99 %     Height: 147.3 cm (4' 10\") Weight: 37.6 kg (82 lb 12.8 oz)  Estimated body mass index is 17.31 kg/m  as calculated from the following:    Height as of this encounter: 1.473 m (4' 10\").    Weight as of this encounter: 37.6 kg (82 lb 12.8 oz).                                                               -- Endocrine follow up ---         Assessment and Plan:   Ms. Myers is a 74 yo female with PMH for osteoporosis (diagnosed with dexa 2017) on calcium/vitamin D, history of multinodular goiter (negative biopsies 2012, 2014) who presents today for follow up.    #Osteoporosis   Diagnosed 4/2017 with DEXA scan, repeat DEXA as noted above. Has been taking citracal petites (2 pills in the morning) for the past year. Was recommended to start IV reclast last year as well as fosamax but was not interested.   Osteoporosis relatively stable T score lumber -3,3 in 8/2022. She does not want to take Fosamax. Currently she is compliant to calcium supplement (citracal 2 tabs daily) and estradiol 0.5 mg weekly and has been doing active life. DXA 8/2024 osteoporosis but stable.      - repeat DEXA summer 2026  - continue citracal 2 tabs and Vit. D supplementation  - Slight increase estrogen 0.5 mg weekly to 0.5 mg twice weekly.     #Multinodular Goiter  Biopsies in 2012 and 2014 (both with nodules from right and left side sampled) - all negative. Patient denies any significant changes in her neck  And no clinical changes since last visit. Thyroid function normal last time checked in " 2018.  Ultrasound in 2018 looked stable when compared to prior though did not get follow up that was recommended.     Stable, next imaging 8/2026 together with bone density        - RTC with me in 2 year      30 minutes spent on the date of the encounter doing chart review, history and exam, documentation and further activities as noted above.    Linda Hodgson MD  Staff Physician  Endocrinology and Metabolism  [facility]  License: [id number]  Pager: [phone]    Interval History as of 8/28/2024 : Patient has been doing well. Medication compliance excellent to citracal and estradiol  . New event includes : no pertinent medical event noted and has been doing well.    Interval History as of 8/23/2023 : Patient has been doing well. Medication compliance   excellent to supplement Ca. New event includes : no pertinent medical event noted .   Brief summary: 72 yo female with PMH for osteoporosis (diagnosed with dexa 2017) on calcium/vitamin D, history of multinodular goiter (negative biopsies 2012, 2014) who presents today for follow up.    Osteoporosis was diagnosed 4/2017 with DEXA scan noted above. Has been taking citracal petites (2 pills in the morning) for the past year. Was recommended to start IV reclast and continue fosamax but patient reported she did not want to do either and was encouraged to continue with her calcium and vit D supplementation with  Weight bearing exercises.     Last DEXA in 2019  Conclusions:  The most negative and valid T-score of -3.4 at the level of the L1 - L3 lumbar spine corresponds with osteoporosis according to WHO criteria for postmenopausal females and men age 50 and over.     For her multinodular goiter, biopsies in 2012 and 2014 (both with nodules from right and left side sampled) - all negative. No repeat thyroid labs since last visit in 2019 when they were WNL. Last US in 2018 looks stable when compared to prior, plan was to follow up with repeat US in 2020  though this was not done.     Interval history:  Patient reports feeling ok today and in general. Denies heat/cold intolerenace, no changes in voice, reports normal appetite and eating ok. No nausea/vomiting, no palpitations, no shortness of breath. No swelling or difficulty swallowing.     For her bone health she is still taking her Vitamin D and calcium, taken every day. She does a lot of standing at her job but not a lot of exercising.     Review Of Systems  12 point ROS negative unless noted above    Past Medical History  Past Medical History:   Diagnosis Date     Thyroid disease      Vertigo        Past Surgical History  Past Surgical History:   Procedure Laterality Date     APPENDECTOMY  09/17/12     COLONOSCOPY  10/2007    repeat 10 yrs     COLONOSCOPY  6/2014    repeat 10 yrs     COLONOSCOPY N/A 7/24/2024    Procedure: Colonoscopy;  Surgeon: Louann Corado MD;  [place] [facility] [place]        Medications  Current Outpatient Medications   Medication Sig Dispense Refill     Calcium Citrate-Vitamin D (CALCIUM CITRATE PETITE/VIT D PO)        estradiol (ESTRACE) 1 MG tablet Take 0.5 tablets (0.5 mg) by mouth once a week 6 tablet 3     multivitamin w/minerals (THERA-VIT-M) tablet Take 1 tablet by mouth daily       polyethylene glycol (GOLYTELY) 236 g suspension The night before the exam at 6 pm drink an 8-ounce glass every 15 minutes until the jug is half empty. If you arrive before 11 AM: Drink the other half of the Golytely jug at 11 PM night before procedure. If you arrive after 11 AM: Drink the other half of the Golytely jug at 6 AM day of procedure. For additional instructions refer to your colonoscopy prep instructions. 4000 mL 0     VITAMIN D, CHOLECALCIFEROL, PO Take 1,000 Units by mouth daily       bisacodyl (DULCOLAX) 5 MG EC tablet Take 2 tablets at 3 pm the day before your procedure. If your procedure is before 11 am, take 2 additional tablets at 11 pm. If your procedure is after 11 am, take 2 additional  tablets at 6 am. For additional instructions refer to your colonoscopy prep instructions. (Patient not taking: Reported on 8/28/2024) 4 tablet 0       Physical Examination:  General appearance: seated comfortably in the chair during assessment. Not in any acute distress  HEENT: PEERLA. Oral cavity is moist and clear. No palpable thyroid/neck abnormalities,  Lungs: bilateral air entry equal. Clear to auscultation. No rhonchi or crepitations heard  Heart: S1 S2 normal. Pulse: regular rate and rhythm, good volume  Abdomen: soft, nontender, nondistended  Neurological: conscious and oriented. Speech: normal. Moving all four extremities equally  Extremities: no edema noted. Dorsalis pedis 2+ bilaterally. No ulcers noted. No tremor is noted over her outstretched hands  Psychiatric: normal mood and affect. Normal judgment        Again, thank you for allowing me to participate in the care of your patient.      Sincerely,    Linda Hodgson MD

## 2025-03-25 ENCOUNTER — ANCILLARY PROCEDURE (OUTPATIENT)
Dept: MAMMOGRAPHY | Facility: CLINIC | Age: 75
End: 2025-03-25
Attending: FAMILY MEDICINE
Payer: COMMERCIAL

## 2025-03-25 DIAGNOSIS — Z12.31 VISIT FOR SCREENING MAMMOGRAM: ICD-10-CM

## 2025-03-25 PROCEDURE — 77063 BREAST TOMOSYNTHESIS BI: CPT | Mod: GC

## 2025-03-25 PROCEDURE — 77067 SCR MAMMO BI INCL CAD: CPT | Mod: GC

## 2025-05-28 ENCOUNTER — PATIENT OUTREACH (OUTPATIENT)
Dept: CARE COORDINATION | Facility: CLINIC | Age: 75
End: 2025-05-28
Payer: COMMERCIAL

## 2025-06-23 SDOH — HEALTH STABILITY: PHYSICAL HEALTH: ON AVERAGE, HOW MANY MINUTES DO YOU ENGAGE IN EXERCISE AT THIS LEVEL?: 30 MIN

## 2025-06-23 SDOH — HEALTH STABILITY: PHYSICAL HEALTH: ON AVERAGE, HOW MANY DAYS PER WEEK DO YOU ENGAGE IN MODERATE TO STRENUOUS EXERCISE (LIKE A BRISK WALK)?: 1 DAY

## 2025-06-23 ASSESSMENT — SOCIAL DETERMINANTS OF HEALTH (SDOH): HOW OFTEN DO YOU GET TOGETHER WITH FRIENDS OR RELATIVES?: ONCE A WEEK

## 2025-06-25 ENCOUNTER — OFFICE VISIT (OUTPATIENT)
Dept: FAMILY MEDICINE | Facility: CLINIC | Age: 75
End: 2025-06-25
Payer: COMMERCIAL

## 2025-06-25 VITALS
OXYGEN SATURATION: 100 % | DIASTOLIC BLOOD PRESSURE: 83 MMHG | BODY MASS INDEX: 17.63 KG/M2 | SYSTOLIC BLOOD PRESSURE: 143 MMHG | WEIGHT: 84 LBS | HEIGHT: 58 IN | HEART RATE: 60 BPM | TEMPERATURE: 99 F

## 2025-06-25 DIAGNOSIS — H25.813 COMBINED FORMS OF AGE-RELATED CATARACT OF BOTH EYES: ICD-10-CM

## 2025-06-25 DIAGNOSIS — Z23 NEED FOR VACCINATION: ICD-10-CM

## 2025-06-25 DIAGNOSIS — H61.20 WAX IN EAR: ICD-10-CM

## 2025-06-25 DIAGNOSIS — E04.2 NON-TOXIC MULTINODULAR GOITER: ICD-10-CM

## 2025-06-25 DIAGNOSIS — Z00.00 ENCOUNTER FOR PREVENTATIVE ADULT HEALTH CARE EXAMINATION: Primary | ICD-10-CM

## 2025-06-25 ASSESSMENT — PAIN SCALES - GENERAL: PAINLEVEL_OUTOF10: NO PAIN (0)

## 2025-06-25 NOTE — PROGRESS NOTES
Preventive Care Visit  [facility] INTEGRATED PRIMARY CARE  Tone Huffman MD, Family Medicine  Jun 25, 2025      Assessment & Plan     Encounter for preventative adult health care examination  due    Wax in ear  Not at goal   - REMOVAL OF IMPACTED WAX MD    Non-toxic multinodular goiter  At goal     Combined forms of age-related cataract of both eyes  Not at goal     Need for vaccination  due  - COVID-19 12+ (PFIZER)    Reviewed preventive health counseling, as reflected in patient instructions  Counseling  Appropriate preventive services were addressed with this patient via screening, questionnaire, or discussion as appropriate for fall prevention, nutrition, physical activity, Tobacco-use cessation, social engagement, weight loss and cognition.  Checklist reviewing preventive services available has been given to the patient.  Reviewed patient's diet, addressing concerns and/or questions.   She is at risk for lack of exercise and has been provided with information to increase physical activity for the benefit of her well-being.   The patient was instructed to see the dentist every 6 months.     Patient Instructions   Followup with new PCP    Moises Hanley is a 74 year old, presenting for the following:  Wellness Visit        6/25/2025     8:20 AM   Additional Questions   Roomed by Lc MAY     Cataracts- can't afford surgery    Lives alone, can't get ahold of old roommates    Advance Care Planning    Discussed advance care planning with patient; however, patient declined at this time.        6/23/2025   General Health   How would you rate your overall physical health? Good   Feel stress (tense, anxious, or unable to sleep) Not at all         6/23/2025   Nutrition   Diet: Regular (no restrictions)         6/23/2025   Exercise   Days per week of moderate/strenous exercise 1 day   Average minutes spent exercising at this level 30 min   (!) EXERCISE CONCERN      6/23/2025   Social  Factors   Frequency of gathering with friends or relatives Once a week   Worry food won't last until get money to buy more No   Food not last or not have enough money for food? No   Do you have housing? (Housing is defined as stable permanent housing and does not include staying outside in a car, in a tent, in an abandoned building, in an overnight shelter, or couch-surfing.) Yes   Are you worried about losing your housing? No   Lack of transportation? No   Unable to get utilities (heat,electricity)? No         6/23/2025   Fall Risk   Fallen 2 or more times in the past year? No   Trouble with walking or balance? No          6/23/2025   Activities of Daily Living- Home Safety   Needs help with the following daily activites None of the above   Safety concerns in the home None of the above         6/23/2025   Dental   Dentist two times every year? (!) NO         6/23/2025   Hearing Screening   Hearing concerns? None of the above         6/23/2025   Driving Risk Screening   Patient/family members have concerns about driving No         6/23/2025   General Alertness/Fatigue Screening   Have you been more tired than usual lately? No         6/23/2025   Urinary Incontinence Screening   Bothered by leaking urine in past 6 months No         Today's PHQ-2 Score:       6/25/2025     8:20 AM   PHQ-2 ( 1999 Pfizer)   Q1: Little interest or pleasure in doing things 0    Q2: Feeling down, depressed or hopeless 0    PHQ-2 Score 0    Q1: Little interest or pleasure in doing things Not at all   Q2: Feeling down, depressed or hopeless Not at all   PHQ-2 Score 0       Proxy-reported           6/23/2025   Substance Use   Alcohol more than 3/day or more than 7/wk Not Applicable   Do you have a current opioid prescription? No   How severe/bad is pain from 1 to 10? 0/10 (No Pain)   Do you use any other substances recreationally? No     Social History     Tobacco Use    Smoking status: Never    Smokeless tobacco: Never   Vaping Use    Vaping  status: Never Used   Substance Use Topics    Alcohol use: No    Drug use: No           3/25/2025   LAST FHS-7 RESULTS   1st degree relative breast or ovarian cancer No   Any male have breast cancer No   Any ONE woman have BOTH breast AND ovarian cancer No   Any woman with breast cancer before 50yrs No   2 or more relatives with breast AND/OR ovarian cancer No   2 or more relatives with breast AND/OR bowel cancer No        Mammogram Screening - Mammogram every 1-2 years updated in Health Maintenance based on mutual decision making    ASCVD Risk   The 10-year ASCVD risk score (Matteo SABA, et al., 2019) is: 13.9%    Values used to calculate the score:      Age: 74 years      Sex: Female      Is Non- : No      Diabetic: No      Tobacco smoker: No      Systolic Blood Pressure: 143 mmHg      Is BP treated: No      HDL Cholesterol: 97 mg/dL      Total Cholesterol: 229 mg/dL        Reviewed and updated as needed this visit by Provider                    Lab work is in process  Labs reviewed in EPIC  Current providers sharing in care for this patient include:  Patient Care Team:  Tone Huffman MD as PCP - General (Family Medicine)  Linda Hodgson MD as Assigned Endocrinology Provider  Tone Huffman MD as Assigned PCP  Prabha Kim MD as Assigned Surgical Provider    The following health maintenance items are reviewed in Epic and correct as of today:  Health Maintenance   Topic Date Due    COVID-19 VACCINE (5 - 2024-25 season) 09/01/2024    MEDICARE ANNUAL WELLNESS VISIT  06/26/2025    ANNUAL REVIEW OF HM ORDERS  06/26/2025    FALL RISK ASSESSMENT  06/25/2026    MAMMO SCREENING  03/25/2027    DIABETES SCREENING  06/26/2027    RSV VACCINE (1 - 1-dose 75+ series) 11/01/2027    LIPID  06/26/2029    ADVANCE CARE PLANNING  06/26/2029    DTAP/TDAP/TD VACCINE (3 - Td or Tdap) 03/22/2032    COLORECTAL CANCER SCREENING  07/24/2034    DEXA  08/12/2039    HEPATITIS C  "SCREENING  Completed    PHQ-2 (once per calendar year)  Completed    INFLUENZA VACCINE  Completed    PNEUMOCOCCAL VACCINE 50+ YEARS  Completed    ZOSTER VACCINE  Completed    HPV VACCINE  Aged Out    MENINGITIS VACCINE  Aged Out         Review of Systems  Constitutional, neuro, ENT, endocrine, pulmonary, cardiac, gastrointestinal, genitourinary, musculoskeletal, integument and psychiatric systems are negative, except as otherwise noted.     Objective    Exam  BP (!) 143/83 (BP Location: Left arm, Patient Position: Sitting, Cuff Size: Adult Small)   Pulse 60   Temp 99  F (37.2  C) (Temporal)   Ht 1.475 m (4' 10.07\")   Wt 38.1 kg (84 lb)   LMP  (LMP Unknown)   SpO2 100%   BMI 17.51 kg/m     Estimated body mass index is 17.51 kg/m  as calculated from the following:    Height as of this encounter: 1.475 m (4' 10.07\").    Weight as of this encounter: 38.1 kg (84 lb).    Physical Exam  GENERAL: alert and no distress  EYES: Eyes grossly normal to inspection, PERRL and conjunctivae and sclerae normal  HENT: ear canals and TM's normal, nose and mouth without ulcers or lesions  NECK: no adenopathy, no asymmetry, masses, or scars  RESP: lungs clear to auscultation - no rales, rhonchi or wheezes  CV: regular rate and rhythm, normal S1 S2, no S3 or S4, no murmur, click or rub, no peripheral edema  ABDOMEN: soft, nontender, no hepatosplenomegaly, no masses and bowel sounds normal  MS: no gross musculoskeletal defects noted, no edema  SKIN: no suspicious lesions or rashes  NEURO: Normal strength and tone, mentation intact and speech normal  PSYCH: mentation appears normal, affect normal/bright        6/25/2025   Mini Cog   Clock Draw Score 2 Normal   3 Item Recall 3 objects recalled   Mini Cog Total Score 5             Signed Electronically by: Tone Huffman MD    "

## (undated) DEVICE — TUBING SUCTION MEDI-VAC 1/4"X20' N620A

## (undated) DEVICE — KIT ENDO FIRST STEP DISINFECTANT 200ML W/POUCH EP-4

## (undated) DEVICE — SPECIMEN CONTAINER 3OZ W/FORMALIN 59901

## (undated) DEVICE — SUCTION MANIFOLD NEPTUNE 2 SYS 1 PORT 702-025-000

## (undated) DEVICE — SOL WATER IRRIG 500ML BOTTLE 2F7113

## (undated) DEVICE — KIT ENDO TURNOVER/PROCEDURE CARRY-ON 101822

## (undated) DEVICE — GOWN IMPERVIOUS 2XL BLUE

## (undated) RX ORDER — EPHEDRINE SULFATE 50 MG/ML
INJECTION, SOLUTION INTRAMUSCULAR; INTRAVENOUS; SUBCUTANEOUS
Status: DISPENSED
Start: 2024-07-24